# Patient Record
Sex: FEMALE | Race: WHITE | HISPANIC OR LATINO | Employment: FULL TIME | URBAN - METROPOLITAN AREA
[De-identification: names, ages, dates, MRNs, and addresses within clinical notes are randomized per-mention and may not be internally consistent; named-entity substitution may affect disease eponyms.]

---

## 2017-01-24 RX ORDER — ALBUTEROL SULFATE 90 UG/1
2 AEROSOL, METERED RESPIRATORY (INHALATION) EVERY 6 HOURS PRN
Qty: 8.5 G | Refills: 2 | Status: SHIPPED | OUTPATIENT
Start: 2017-01-24 | End: 2018-10-12

## 2017-01-24 NOTE — TELEPHONE ENCOUNTER
Was the patient seen in the last year in this department? Yes     Does patient have an active prescription for medications requested? Yes     Received Request Via: Patient    Last office visit: 11/09/2016  Last labs: 07/14/2016

## 2017-01-26 ENCOUNTER — HOSPITAL ENCOUNTER (OUTPATIENT)
Dept: LAB | Facility: MEDICAL CENTER | Age: 32
End: 2017-01-26
Attending: INTERNAL MEDICINE
Payer: COMMERCIAL

## 2017-01-26 ENCOUNTER — OFFICE VISIT (OUTPATIENT)
Dept: MEDICAL GROUP | Facility: MEDICAL CENTER | Age: 32
End: 2017-01-26
Payer: COMMERCIAL

## 2017-01-26 VITALS
SYSTOLIC BLOOD PRESSURE: 118 MMHG | OXYGEN SATURATION: 96 % | TEMPERATURE: 97.9 F | BODY MASS INDEX: 24.01 KG/M2 | HEIGHT: 66 IN | RESPIRATION RATE: 14 BRPM | HEART RATE: 72 BPM | WEIGHT: 149.4 LBS | DIASTOLIC BLOOD PRESSURE: 52 MMHG

## 2017-01-26 DIAGNOSIS — Z00.00 HEALTH CARE MAINTENANCE: ICD-10-CM

## 2017-01-26 DIAGNOSIS — E66.9 OBESITY: ICD-10-CM

## 2017-01-26 DIAGNOSIS — Z20.2 CONTACT WITH AND (SUSPECTED) EXPOSURE TO INFECTIONS WITH A PREDOMINANTLY SEXUAL MODE OF TRANSMISSION: ICD-10-CM

## 2017-01-26 DIAGNOSIS — Z11.4 ENCOUNTER FOR SCREENING FOR HIV: ICD-10-CM

## 2017-01-26 LAB
HCV AB S/CO SERPL IA: NEGATIVE
HIV 1+2 AB+HIV1 P24 AG SERPL QL IA: NON REACTIVE
TSH SERPL DL<=0.005 MIU/L-ACNC: 1.44 UIU/ML (ref 0.35–5.5)

## 2017-01-26 PROCEDURE — 84443 ASSAY THYROID STIM HORMONE: CPT

## 2017-01-26 PROCEDURE — 86803 HEPATITIS C AB TEST: CPT

## 2017-01-26 PROCEDURE — 87389 HIV-1 AG W/HIV-1&-2 AB AG IA: CPT

## 2017-01-26 PROCEDURE — 99214 OFFICE O/P EST MOD 30 MIN: CPT | Performed by: INTERNAL MEDICINE

## 2017-01-26 PROCEDURE — 36415 COLL VENOUS BLD VENIPUNCTURE: CPT

## 2017-01-26 PROCEDURE — 86694 HERPES SIMPLEX NES ANTBDY: CPT | Mod: 91

## 2017-01-26 ASSESSMENT — PATIENT HEALTH QUESTIONNAIRE - PHQ9: CLINICAL INTERPRETATION OF PHQ2 SCORE: 0

## 2017-01-26 NOTE — MR AVS SNAPSHOT
"        Nickie Martin   2017 7:00 AM   Office Visit   MRN: [id number]    Department:  [place] [address]   Dept Phone:  [phone]    Description:  Female : 1985   Provider:  Marci Villegas M.D.           Reason for Visit     Follow-Up needs blood work order      Allergies as of 2017     Allergen Noted Reactions    Bee Venom 2016   Swelling      You were diagnosed with     Health care maintenance   [048481]       Contact with and (suspected) exposure to infections with a predominantly sexual mode of transmission   [2808533]       Encounter for screening for HIV   [5863332]         Vital Signs     Blood Pressure Pulse Temperature Respirations Height Weight    118/52 mmHg 72 36.6 °C (97.9 °F) 14 1.689 m (5' 6.5\") 67.767 kg (149 lb 6.4 oz)    Body Mass Index Oxygen Saturation Smoking Status             23.76 kg/m2 96% Never Smoker          Basic Information     Date Of Birth Sex Race Ethnicity Preferred Language    1985 Female Unknown Non- English      Your appointments     May 09, 2017  8:20 AM   Established Patient with Marci Villegas M.D.   [facility]    [address]  [address]  [facility] [address] [phone]           You will be receiving a confirmation call a few days before your appointment from our automated call confirmation system.              Problem List              ICD-10-CM Priority Class Noted - Resolved    Shift work sleep disorder G47.26   10/22/2015 - Present    Exercise-induced asthma J45.990   10/22/2015 - Present    Health care maintenance Z00.00   10/22/2015 - Present    Cholesteatoma H71.90   2016 - Present    Bee sting allergy Z91.038   2016 - Present      Health Maintenance        Date Due Completion Dates    IMM DTaP/Tdap/Td Vaccine (1 - Tdap) 2003 ---    PAP SMEAR 2019, 2016, 2016 (Done)    Override on 2016: Done            Current " Immunizations     Influenza TIV (IM) 10/23/2013, 10/10/2012    Influenza Vaccine Quad Inj (Pf) 10/3/2016 10:04 AM, 10/13/2014    Influenza Vaccine Quad Inj (Preserved) 10/13/2015    Tuberculin Skin Test 6/8/2016  8:31 AM, 5/19/2016  3:46 PM, 1/3/2014 12:25 PM, 1/4/2013  2:20 PM, 1/10/2011  7:20 AM      Below and/or attached are the medications your provider expects you to take. Review all of your home medications and newly ordered medications with your provider and/or pharmacist. Follow medication instructions as directed by your provider and/or pharmacist. Please keep your medication list with you and share with your provider. Update the information when medications are discontinued, doses are changed, or new medications (including over-the-counter products) are added; and carry medication information at all times in the event of emergency situations     Allergies:  BEE VENOM - Swelling               Medications  Valid as of: January 26, 2017 -  7:16 AM    Generic Name Brand Name Tablet Size Instructions for use    Albuterol Sulfate (Aero Soln) albuterol 108 (90 BASE) MCG/ACT Inhale 2 Puffs by mouth every 6 hours as needed.        ALPRAZolam (Tab) XANAX 0.25 MG Take 1 Tab by mouth 2 times a day as needed for Sleep.        Fluconazole (Tab) DIFLUCAN 150 MG Take one tablet every three days        Zolpidem Tartrate (Tab) AMBIEN 10 MG Take 1 Tab by mouth at bedtime as needed.        .                 Medicines prescribed today were sent to:     [id number] - [place] - [address] [place] [address]    Phone: [phone] Fax: [phone]    Open 24 Hours?: No      Medication refill instructions:       If your prescription bottle indicates you have medication refills left, it is not necessary to call your provider’s office. Please contact your pharmacy and they will refill your medication.    If your prescription bottle indicates you do not have any refills left, you may request refills at any time  through one of the following ways: The online [employer] system (except Urgent Care), by calling your provider’s office, or by asking your pharmacy to contact your provider’s office with a refill request. Medication refills are processed only during regular business hours and may not be available until the next business day. Your provider may request additional information or to have a follow-up visit with you prior to refilling your medication.   *Please Note: Medication refills are assigned a new Rx number when refilled electronically. Your pharmacy may indicate that no refills were authorized even though a new prescription for the same medication is available at the pharmacy. Please request the medicine by name with the pharmacy before contacting your provider for a refill.        Your To Do List     Future Labs/Procedures Complete By Expires    HEP C VIRUS ANTIBODY  As directed 1/27/2018    HIV ANTIBODIES  As directed 1/27/2018    HSV I/II IGG & IGM SERUM  As directed 1/27/2018         [employer] Access Code: Activation code not generated  Current [employer] Status: Active

## 2017-01-26 NOTE — PROGRESS NOTES
CHIEF COMPLAINT  Chief Complaint   Patient presents with   • Follow-Up     needs blood work order       HPI  Patient is a 31 y.o. female patient who presents today for the following     Possible contact to STD, decreased labs for STDs  The patient had the last sexual contact with her  in 8/2016, and found out that he had been sexually active with other partners.      Denies:  Abnormal vaginal discharge  Suprapubic discomfort.   She has had periods every month,     Reviewed PMH, PSH, FH, SH, ALL, HCM/IMM, no changes  Reviewed MEDS, no changes    Patient Active Problem List    Diagnosis Date Noted   • Bee sting allergy 11/09/2016   • Cholesteatoma 04/11/2016   • Shift work sleep disorder 10/22/2015   • Exercise-induced asthma 10/22/2015   • Health care maintenance 10/22/2015     CURRENT MEDICATIONS  Current Outpatient Prescriptions   Medication Sig Dispense Refill   • albuterol 108 (90 BASE) MCG/ACT Aero Soln inhalation aerosol Inhale 2 Puffs by mouth every 6 hours as needed. 8.5 g 2   • zolpidem (AMBIEN) 10 MG Tab Take 1 Tab by mouth at bedtime as needed. 30 Tab 5   • alprazolam (XANAX) 0.25 MG Tab Take 1 Tab by mouth 2 times a day as needed for Sleep. 40 Tab 1   • fluconazole (DIFLUCAN) 150 MG tablet Take one tablet every three days 3 Tab 0     No current facility-administered medications for this visit.     ALLERGIES  Allergies: Bee venom  PAST MEDICAL HISTORY  Past Medical History   Diagnosis Date   • ASTHMA    • Insomnia    • Cholesteatoma      right     SURGICAL HISTORY  She  has past surgical history that includes anesth,ear surgery (2000) and mammoplasty augmentation (4/18/2012).  SOCIAL HISTORY  Social History   Substance Use Topics   • Smoking status: Never Smoker    • Smokeless tobacco: Never Used   • Alcohol Use: 0.6 oz/week     1 Standard drinks or equivalent per week      Comment: 4 per week     Social History     Social History Narrative    Lives with .     Children: 1    Work: [employer] RN  "    FAMILY HISTORY  Family History   Problem Relation Age of Onset   • Cancer Neg Hx    • Allergies Paternal Grandfather      asthma   • Diabetes Paternal Grandmother    • Heart Disease Neg Hx    • Hypertension Maternal Grandmother    • Hyperlipidemia Neg Hx    • Stroke Neg Hx    • Thyroid Neg Hx      No family status information on file.     Health Maintenance: Completed        ROS   Constitutional: Negative for fever, chills.  HENT: Negative for congestion, sore throat.  Eyes: Negative for blurred vision.   Respiratory: Negative for cough, shortness of breath.  Cardiovascular: Negative for chest pain, palpitations.   Gastrointestinal: Negative for heartburn, nausea, abdominal pain.   Genitourinary: Negative for dysuria. And per HPI.  Musculoskeletal: Negative for significant myalgias, back pain and joint pain.   Skin: Negative for rash and itching.   Neuro: Negative for dizziness, weakness and headaches.   Endo/Heme/Allergies: Does not bruise/bleed easily.   Psychiatric/Behavioral: Negative for depression, anxiety    PHYSICAL EXAM   Blood pressure 118/52, pulse 72, temperature 36.6 °C (97.9 °F), resp. rate 14, height 1.689 m (5' 6.5\"), weight 67.767 kg (149 lb 6.4 oz), SpO2 96 %, currently breastfeeding. Body mass index is 23.76 kg/(m^2).  General:  NAD, well appearing  HEENT:   NC/AT, PERRLA, EOMI, TMs are clear. Oropharyngeal mucosa is pink,  without lesions;  no cervical / supraclavicular  lymphadenopathy, no thyromegaly.    Cardiovascular: RRR.   No m/r/g. No carotid bruits .      Lungs:   CTAB, no w/r/r, no respiratory distress.  Abdomen: Soft, NT/ND + BS; no suprapubic tenderness; no masses or hepatosplenomegaly.  Extremities:  2+ DP and radial pulses bilaterally.  No c/c/e.   Skin:  Warm, dry.  No erythema. No rash.   Neurologic: Alert & oriented x 3. CN II-XII grossly intact. Brachioradialis / knee DTR are 2/4, symmetric. Strength and sensation grossly intact.  No focal deficits.  Psychiatric:  Affect " normal, mood normal, judgment normal.    LABS     Pending    IMAGING     None    ASSESMENT AND PLAN        1. Contact with and (suspected) exposure to infections with a predominantly sexual mode of transmission  - HSV I/II IGG & IGM SERUM; Future  - HEP C VIRUS ANTIBODY; Future    2. Encounter for screening for HIV  - HIV ANTIBODIES; Future    3. Health care maintenance  UTD    Counseling:   - Smoking:  Nonsmoker    Followup: Return if symptoms worsen or fail to improve.    All questions are answered.    Please note that this dictation was created using voice recognition software, and that there might be errors of roland and possibly content.

## 2017-01-29 LAB
HSV1+2 IGG SER IA-ACNC: 0.16 IV
HSV1+2 IGM SER IA-ACNC: 1.02 IV

## 2017-01-30 DIAGNOSIS — Z01.89 PATIENT REQUESTED DIAGNOSTIC TESTING: ICD-10-CM

## 2017-01-30 RX ORDER — FLUCONAZOLE 150 MG/1
TABLET ORAL
Qty: 1 TAB | Refills: 0 | Status: SHIPPED | OUTPATIENT
Start: 2017-01-30 | End: 2017-07-26

## 2017-01-30 NOTE — TELEPHONE ENCOUNTER
Was the patient seen in the last year in this department? Yes     Does patient have an active prescription for medications requested? No     Received Request Via: Patient     Last Office Visit: 1/26/17    Last Labs: 1/26/17

## 2017-01-30 NOTE — TELEPHONE ENCOUNTER
From: Nickie Martin  To: Marci Villegas M.D.  Sent: 1/30/2017 7:15 AM PST  Subject: Medication Renewal Request    Original authorizing provider: JACOB Caba would like a refill of the following medications:  fluconazole (DIFLUCAN) 150 MG tablet [Marci Villegas M.D.]    Preferred pharmacy: [id number] - [place], [place] - [address]    Comment:

## 2017-01-31 DIAGNOSIS — Z01.89 PATIENT REQUESTED DIAGNOSTIC TESTING: ICD-10-CM

## 2017-03-06 DIAGNOSIS — G47.26 SHIFT WORK SLEEP DISORDER: ICD-10-CM

## 2017-03-06 RX ORDER — TRIAZOLAM 0.12 MG/1
0.12 TABLET ORAL NIGHTLY PRN
Qty: 30 TAB | Refills: 2 | Status: ON HOLD | OUTPATIENT
Start: 2017-03-06 | End: 2017-08-04

## 2017-05-11 DIAGNOSIS — F41.9 ANXIETY: ICD-10-CM

## 2017-05-11 RX ORDER — ALPRAZOLAM 0.25 MG/1
0.25 TABLET ORAL 2 TIMES DAILY PRN
Qty: 40 TAB | Refills: 0
Start: 2017-05-11

## 2017-05-11 NOTE — TELEPHONE ENCOUNTER
Was the patient seen in the last year in this department? Yes     Does patient have an active prescription for medications requested? No     Received Request Via: Patient     Last Visit: 1/26/17    Last Labs: 1/26/17

## 2017-05-24 RX ORDER — ZOLPIDEM TARTRATE 10 MG/1
10 TABLET ORAL NIGHTLY PRN
Qty: 30 TAB | Refills: 0 | Status: SHIPPED | OUTPATIENT
Start: 2017-05-24 | End: 2017-06-29 | Stop reason: SDUPTHER

## 2017-05-24 RX ORDER — ZOLPIDEM TARTRATE 10 MG/1
10 TABLET ORAL NIGHTLY PRN
COMMUNITY
End: 2017-05-24 | Stop reason: SDUPTHER

## 2017-06-13 ENCOUNTER — HOSPITAL ENCOUNTER (OUTPATIENT)
Dept: RADIOLOGY | Facility: MEDICAL CENTER | Age: 32
End: 2017-06-13
Attending: INTERNAL MEDICINE
Payer: COMMERCIAL

## 2017-06-13 ENCOUNTER — OFFICE VISIT (OUTPATIENT)
Dept: MEDICAL GROUP | Facility: MEDICAL CENTER | Age: 32
End: 2017-06-13
Payer: COMMERCIAL

## 2017-06-13 VITALS
BODY MASS INDEX: 23.53 KG/M2 | WEIGHT: 146.39 LBS | HEART RATE: 69 BPM | OXYGEN SATURATION: 99 % | HEIGHT: 66 IN | TEMPERATURE: 97.6 F | DIASTOLIC BLOOD PRESSURE: 66 MMHG | SYSTOLIC BLOOD PRESSURE: 116 MMHG

## 2017-06-13 DIAGNOSIS — N63.0 BREAST LUMP IN LOWER INNER QUADRANT: ICD-10-CM

## 2017-06-13 DIAGNOSIS — Z00.00 HEALTH CARE MAINTENANCE: ICD-10-CM

## 2017-06-13 DIAGNOSIS — Z98.82 S/P BREAST AUGMENTATION: ICD-10-CM

## 2017-06-13 PROCEDURE — G0204 DX MAMMO INCL CAD BI: HCPCS

## 2017-06-13 PROCEDURE — 99214 OFFICE O/P EST MOD 30 MIN: CPT | Performed by: INTERNAL MEDICINE

## 2017-06-13 PROCEDURE — 76642 ULTRASOUND BREAST LIMITED: CPT | Mod: RT

## 2017-06-13 NOTE — PROGRESS NOTES
CHIEF COMPLAINT  Chief Complaint   Patient presents with   • Other     lump on right breast      HPI  Patient is a 31 y.o. female patient who presents today for the following     Breast lump, s/p breast augmentation  The patient noticed lump in the right breast  4 days ago:  Lower/medial quadrant,    1 cm, non- tender, non-movable.   No change of the breast skin, asymmetry, nipple discharge.   Change with menstrual cycles: NA   LMP: 3 weeks ago  No axillary, cervical LAD.   No fatigue, fevers, night sweats, weight loss.   She is  doing self breast exam.     FH of breast cancer: neg    Reviewed PMH, PSH, FH, SH, ALL, HCM/IMM, no changes  Reviewed MEDS, no changes    Patient Active Problem List    Diagnosis Date Noted   • Bee sting allergy 11/09/2016   • Cholesteatoma 04/11/2016   • Shift work sleep disorder 10/22/2015   • Exercise-induced asthma 10/22/2015   • Health care maintenance 10/22/2015     CURRENT MEDICATIONS  Current Outpatient Prescriptions   Medication Sig Dispense Refill   • zolpidem (AMBIEN) 10 MG Tab Take 1 Tab by mouth at bedtime as needed for Sleep. 30 Tab 0   • triazolam (HALCION) 0.125 MG tablet Take 1 Tab by mouth at bedtime as needed. 30 Tab 2   • fluconazole (DIFLUCAN) 150 MG tablet Take one tablet every three days 1 Tab 0   • albuterol 108 (90 BASE) MCG/ACT Aero Soln inhalation aerosol Inhale 2 Puffs by mouth every 6 hours as needed. 8.5 g 2   • alprazolam (XANAX) 0.25 MG Tab Take 1 Tab by mouth 2 times a day as needed for Sleep. 40 Tab 1     No current facility-administered medications for this visit.     ALLERGIES  Allergies: Bee venom  PAST MEDICAL HISTORY  Past Medical History   Diagnosis Date   • ASTHMA    • Insomnia    • Cholesteatoma      right     SURGICAL HISTORY  She  has past surgical history that includes anesth,ear surgery (2000) and mammoplasty augmentation (4/18/2012).  SOCIAL HISTORY  Social History   Substance Use Topics   • Smoking status: Never Smoker    • Smokeless tobacco:  "Never Used   • Alcohol Use: 0.6 oz/week     1 Standard drinks or equivalent per week      Comment: 4 per week     Social History     Social History Narrative    Lives with .     Children: 1    Work: [employer]     FAMILY HISTORY  Family History   Problem Relation Age of Onset   • Cancer Neg Hx    • Allergies Paternal Grandfather      asthma   • Diabetes Paternal Grandmother    • Heart Disease Neg Hx    • Hypertension Maternal Grandmother    • Hyperlipidemia Neg Hx    • Stroke Neg Hx    • Thyroid Neg Hx      No family status information on file.     ROS   Constitutional: Negative for fever, chills.  HENT: Negative for congestion, sore throat.  Eyes: Negative for blurred vision.   Respiratory: Negative for cough, shortness of breath.  Cardiovascular: Negative for chest pain, palpitations.   Gastrointestinal: Negative for heartburn, nausea, abdominal pain.   Genitourinary: Negative for dysuria.  Musculoskeletal: Negative for significant myalgias, back pain and joint pain.   Skin: Negative for rash and itching.   Neuro: Negative for dizziness, weakness and headaches.   Endo/Heme/Allergies: Does not bruise/bleed easily.   Psychiatric/Behavioral: Negative for depression, anxiety    PHYSICAL EXAM   Blood pressure 116/66, pulse 69, temperature 36.4 °C (97.6 °F), height 1.676 m (5' 5.98\"), weight 66.4 kg (146 lb 6.2 oz), SpO2 99 %, currently breastfeeding. Body mass index is 23.64 kg/(m^2).  General:  NAD, well appearing  HEENT:   NC/AT, PERRLA, EOMI, TMs are clear. Oropharyngeal mucosa is pink,  without lesions;  no cervical / supraclavicular  lymphadenopathy, no thyromegaly.    Cardiovascular: RRR.   No m/r/g. No carotid bruits .      Lungs:   CTAB, no w/r/r, no respiratory distress.  Breasts examined seated and supine. No skin changes, peau d'orange or nipple retraction. No axillary or supraclavicular adenopathy.   Non-tender, firm, movable lump 1.5 - 2 x 1 cm at 3 o.clock on the right side.  Breast implants. "   Abdomen: Soft, NT/ND + BS; no suprapubic tenderness; no masses or hepatosplenomegaly.  Extremities:  2+ DP and radial pulses bilaterally.  No c/c/e.   Skin:  Warm, dry.  No erythema. No rash.   Neurologic: Alert & oriented x 3. No focal deficits.  Psychiatric:  Affect normal, mood normal, judgment normal.    LABS     None    IMAGING     None    ASSESMENT AND PLAN        1. Breast lump in lower inner quadrant  2. S/P breast augmentation  - US-BREAST COMPLETE-RIGHT; Future    3. Health care maintenance  UTD    Counseling:   - Smoking:  Nonsmoker    Followup: Return if symptoms worsen or fail to improve, for Short.    All questions are answered.    Please note that this dictation was created using voice recognition software, and that there might be errors of roland and possibly content.

## 2017-06-14 ENCOUNTER — TELEPHONE (OUTPATIENT)
Dept: MEDICAL GROUP | Facility: MEDICAL CENTER | Age: 32
End: 2017-06-14

## 2017-06-14 ENCOUNTER — PATIENT MESSAGE (OUTPATIENT)
Dept: MEDICAL GROUP | Facility: MEDICAL CENTER | Age: 32
End: 2017-06-14

## 2017-06-14 DIAGNOSIS — N63.0 BREAST LUMP IN FEMALE: ICD-10-CM

## 2017-06-20 ENCOUNTER — HOSPITAL ENCOUNTER (OUTPATIENT)
Dept: LAB | Facility: MEDICAL CENTER | Age: 32
End: 2017-06-20
Payer: COMMERCIAL

## 2017-06-20 LAB
BDY FAT % MEASURED: 27.5 %
BP DIAS: 66 MMHG
BP SYS: 98 MMHG
CHOLEST SERPL-MCNC: 169 MG/DL (ref 100–199)
DIABETES HTDIA: NO
EVENT NAME HTEVT: NORMAL
FASTING HTFAS: YES
GLUCOSE SERPL-MCNC: 100 MG/DL (ref 65–99)
HDLC SERPL-MCNC: 60 MG/DL
HYPERTENSION HTHYP: NO
LDLC SERPL CALC-MCNC: 93 MG/DL
SCREENING LOC CITY HTCIT: NORMAL
SCREENING LOC STATE HTSTA: NORMAL
SCREENING LOCATION HTLOC: NORMAL
SMOKING HTSMO: NO
SUBSCRIBER ID HTSID: NORMAL
TRIGL SERPL-MCNC: 78 MG/DL (ref 0–149)

## 2017-06-20 PROCEDURE — S5190 WELLNESS ASSESSMENT BY NONPH: HCPCS

## 2017-06-20 PROCEDURE — 80061 LIPID PANEL: CPT

## 2017-06-20 PROCEDURE — 82947 ASSAY GLUCOSE BLOOD QUANT: CPT

## 2017-06-29 RX ORDER — ZOLPIDEM TARTRATE 10 MG/1
10 TABLET ORAL NIGHTLY PRN
Qty: 30 TAB | Refills: 0 | Status: SHIPPED | OUTPATIENT
Start: 2017-06-29 | End: 2017-08-07 | Stop reason: SDUPTHER

## 2017-07-26 ENCOUNTER — APPOINTMENT (OUTPATIENT)
Dept: ADMISSIONS | Facility: MEDICAL CENTER | Age: 32
End: 2017-07-26
Payer: COMMERCIAL

## 2017-07-26 ENCOUNTER — PATIENT MESSAGE (OUTPATIENT)
Dept: MEDICAL GROUP | Facility: MEDICAL CENTER | Age: 32
End: 2017-07-26

## 2017-07-26 ENCOUNTER — APPOINTMENT (OUTPATIENT)
Dept: ADMISSIONS | Facility: MEDICAL CENTER | Age: 32
End: 2017-07-26
Attending: SURGERY
Payer: COMMERCIAL

## 2017-07-26 DIAGNOSIS — F41.9 ANXIETY: ICD-10-CM

## 2017-07-26 RX ORDER — ALPRAZOLAM 0.25 MG/1
0.25 TABLET ORAL 2 TIMES DAILY PRN
Qty: 40 TAB | Refills: 1 | Status: SHIPPED | OUTPATIENT
Start: 2017-07-26 | End: 2017-10-26 | Stop reason: SDUPTHER

## 2017-08-04 ENCOUNTER — HOSPITAL ENCOUNTER (OUTPATIENT)
Facility: MEDICAL CENTER | Age: 32
End: 2017-08-04
Attending: SURGERY | Admitting: SURGERY
Payer: COMMERCIAL

## 2017-08-04 VITALS
BODY MASS INDEX: 22.71 KG/M2 | OXYGEN SATURATION: 100 % | RESPIRATION RATE: 16 BRPM | HEART RATE: 64 BPM | HEIGHT: 66 IN | SYSTOLIC BLOOD PRESSURE: 107 MMHG | TEMPERATURE: 98.6 F | WEIGHT: 141.31 LBS | DIASTOLIC BLOOD PRESSURE: 63 MMHG

## 2017-08-04 PROBLEM — R92.8 ABNORMAL MAMMOGRAM, UNSPECIFIED: Status: ACTIVE | Noted: 2017-08-04

## 2017-08-04 LAB
HCG UR QL: NEGATIVE
SP GR UR REFRACTOMETRY: 1.02

## 2017-08-04 PROCEDURE — 700111 HCHG RX REV CODE 636 W/ 250 OVERRIDE (IP)

## 2017-08-04 PROCEDURE — 160009 HCHG ANES TIME/MIN: Performed by: SURGERY

## 2017-08-04 PROCEDURE — 160047 HCHG PACU  - EA ADDL 30 MINS PHASE II: Performed by: SURGERY

## 2017-08-04 PROCEDURE — 160035 HCHG PACU - 1ST 60 MINS PHASE I: Performed by: SURGERY

## 2017-08-04 PROCEDURE — 160036 HCHG PACU - EA ADDL 30 MINS PHASE I: Performed by: SURGERY

## 2017-08-04 PROCEDURE — 160048 HCHG OR STATISTICAL LEVEL 1-5: Performed by: SURGERY

## 2017-08-04 PROCEDURE — 88307 TISSUE EXAM BY PATHOLOGIST: CPT

## 2017-08-04 PROCEDURE — 700102 HCHG RX REV CODE 250 W/ 637 OVERRIDE(OP)

## 2017-08-04 PROCEDURE — A9270 NON-COVERED ITEM OR SERVICE: HCPCS

## 2017-08-04 PROCEDURE — 160046 HCHG PACU - 1ST 60 MINS PHASE II: Performed by: SURGERY

## 2017-08-04 PROCEDURE — A6402 STERILE GAUZE <= 16 SQ IN: HCPCS | Performed by: SURGERY

## 2017-08-04 PROCEDURE — 81025 URINE PREGNANCY TEST: CPT

## 2017-08-04 PROCEDURE — 160002 HCHG RECOVERY MINUTES (STAT): Performed by: SURGERY

## 2017-08-04 PROCEDURE — 160025 RECOVERY II MINUTES (STATS): Performed by: SURGERY

## 2017-08-04 PROCEDURE — 160029 HCHG SURGERY MINUTES - 1ST 30 MINS LEVEL 4: Performed by: SURGERY

## 2017-08-04 PROCEDURE — 500698 HCHG HEMOCLIP, MEDIUM: Performed by: SURGERY

## 2017-08-04 PROCEDURE — 501838 HCHG SUTURE GENERAL: Performed by: SURGERY

## 2017-08-04 PROCEDURE — 500122 HCHG BOVIE, BLADE: Performed by: SURGERY

## 2017-08-04 RX ORDER — LIDOCAINE HYDROCHLORIDE 10 MG/ML
0.5 INJECTION, SOLUTION INFILTRATION; PERINEURAL
Status: DISCONTINUED | OUTPATIENT
Start: 2017-08-04 | End: 2017-08-04 | Stop reason: HOSPADM

## 2017-08-04 RX ORDER — BUPIVACAINE HYDROCHLORIDE 2.5 MG/ML
INJECTION, SOLUTION EPIDURAL; INFILTRATION; INTRACAUDAL
Status: DISCONTINUED | OUTPATIENT
Start: 2017-08-04 | End: 2017-08-04 | Stop reason: HOSPADM

## 2017-08-04 RX ORDER — KETOROLAC TROMETHAMINE 30 MG/ML
INJECTION, SOLUTION INTRAMUSCULAR; INTRAVENOUS
Status: COMPLETED
Start: 2017-08-04 | End: 2017-08-04

## 2017-08-04 RX ORDER — HYDROCODONE BITARTRATE AND ACETAMINOPHEN 5; 325 MG/1; MG/1
1-2 TABLET ORAL EVERY 4 HOURS PRN
Status: DISCONTINUED | OUTPATIENT
Start: 2017-08-04 | End: 2017-08-04 | Stop reason: HOSPADM

## 2017-08-04 RX ORDER — LIDOCAINE AND PRILOCAINE 25; 25 MG/G; MG/G
1 CREAM TOPICAL
Status: DISCONTINUED | OUTPATIENT
Start: 2017-08-04 | End: 2017-08-04 | Stop reason: HOSPADM

## 2017-08-04 RX ORDER — B-COMPLEX WITH VITAMIN C
1 TABLET ORAL DAILY
COMMUNITY
End: 2018-10-12

## 2017-08-04 RX ORDER — IBUPROFEN 200 MG
400 TABLET ORAL
COMMUNITY
End: 2020-06-23

## 2017-08-04 RX ORDER — MEPERIDINE HYDROCHLORIDE 25 MG/ML
INJECTION INTRAMUSCULAR; INTRAVENOUS; SUBCUTANEOUS
Status: COMPLETED
Start: 2017-08-04 | End: 2017-08-04

## 2017-08-04 RX ORDER — OXYCODONE HCL 5 MG/5 ML
SOLUTION, ORAL ORAL
Status: COMPLETED
Start: 2017-08-04 | End: 2017-08-04

## 2017-08-04 RX ORDER — DEXTROSE AND SODIUM CHLORIDE 5; .45 G/100ML; G/100ML
INJECTION, SOLUTION INTRAVENOUS CONTINUOUS
Status: DISCONTINUED | OUTPATIENT
Start: 2017-08-04 | End: 2017-08-04 | Stop reason: HOSPADM

## 2017-08-04 RX ORDER — SODIUM CHLORIDE, SODIUM LACTATE, POTASSIUM CHLORIDE, CALCIUM CHLORIDE 600; 310; 30; 20 MG/100ML; MG/100ML; MG/100ML; MG/100ML
1000 INJECTION, SOLUTION INTRAVENOUS
Status: COMPLETED | OUTPATIENT
Start: 2017-08-04 | End: 2017-08-04

## 2017-08-04 RX ADMIN — KETOROLAC TROMETHAMINE 30 MG: 30 INJECTION, SOLUTION INTRAMUSCULAR at 14:55

## 2017-08-04 RX ADMIN — SODIUM CHLORIDE, SODIUM LACTATE, POTASSIUM CHLORIDE, CALCIUM CHLORIDE 1000 ML: 600; 310; 30; 20 INJECTION, SOLUTION INTRAVENOUS at 11:56

## 2017-08-04 RX ADMIN — FENTANYL CITRATE 50 MCG: 50 INJECTION, SOLUTION INTRAMUSCULAR; INTRAVENOUS at 15:08

## 2017-08-04 RX ADMIN — MEPERIDINE HYDROCHLORIDE 12.5 MG: 25 INJECTION INTRAMUSCULAR; INTRAVENOUS; SUBCUTANEOUS at 15:31

## 2017-08-04 RX ADMIN — OXYCODONE HYDROCHLORIDE 10 MG: 5 SOLUTION ORAL at 15:07

## 2017-08-04 ASSESSMENT — PAIN SCALES - GENERAL
PAINLEVEL_OUTOF10: 0
PAINLEVEL_OUTOF10: 3
PAINLEVEL_OUTOF10: 0
PAINLEVEL_OUTOF10: 1
PAINLEVEL_OUTOF10: 0

## 2017-08-04 NOTE — IP AVS SNAPSHOT
8/4/2017    Nickie Martin  [address] [place] [address]    Dear Nickie:    [facility] wants to ensure your discharge home is safe and you or your loved ones have had all of your questions answered regarding your care after you leave the hospital.    Below is a list of resources and contact information should you have any questions regarding your hospital stay, follow-up instructions, or active medical symptoms.    Questions or Concerns Regarding… Contact   Medical Questions Related to Your Discharge  (7 days a week, 8am-5pm) Contact a Nurse Care Coordinator   [phone]   Medical Questions Not Related to Your Discharge  (24 hours a day / 7 days a week)  Contact the Nurse Health Line   [phone]    Medications or Discharge Instructions Refer to your discharge packet   or contact your [facility] Primary Care Provider   [phone]   Follow-up Appointment(s) Schedule your appointment via [employer]   or contact Scheduling [phone]   Billing Review your statement via [employer]  or contact Billing [phone]   Medical Records Review your records via [employer]   or contact Medical Records [phone]     You may receive a telephone call within two days of discharge. This call is to make certain you understand your discharge instructions and have the opportunity to have any questions answered. You can also easily access your medical information, test results and upcoming appointments via the [employer] free online health management tool. You can learn more and sign up at [employer]. For assistance setting up your [employer] account, please call [phone].    Once again, we want to ensure your discharge home is safe and that you have a clear understanding of any next steps in your care. If you have any questions or concerns, please do not hesitate to contact us, we are here for you. Thank you for choosing [facility] for your healthcare needs.    Sincerely,    Your [facility] Healthcare Team

## 2017-08-04 NOTE — OR NURSING
Pt's contact updated shortly after arrival to [facility].  Attempted to call again with no answer, contact will be picking up pt.  Rx on front of chart.  Pain/discomfort caused by dry eyes much better, pt flushed eyes with NS multiple times.  Emily MCNAIR by to see/update pt.

## 2017-08-04 NOTE — IP AVS SNAPSHOT
" Home Care Instructions                                                                                                                Name:Nickie Martin  Medical Record Number:[id number]  CSN: [id number]    YOB: 1985   Age: 31 y.o.  Sex: female  HT:1.676 m (5' 6\") WT: 64.1 kg (141 lb 5 oz)          Admit Date: 8/4/2017     Discharge Date:   Today's Date: 8/4/2017  Attending Doctor:  Ngoc Simmons M.D.                  Allergies:  Bee venom                Discharge Instructions       SURGICAL DRESSING/BATHING: May remove dressings 08/06/2017.  FOLLOW-UP APPOINTMENT:  A follow-up appointment should be arranged with your doctor on 08/09/2017, call to schedule.    ACTIVITY: Rest and take it easy for the first 24 hours.  A responsible adult is recommended to remain with you during that time.  It is normal to feel sleepy.  We encourage you to not do anything that requires balance, judgment or coordination.    MILD FLU-LIKE SYMPTOMS ARE NORMAL. YOU MAY EXPERIENCE GENERALIZED MUSCLE ACHES, THROAT IRRITATION, HEADACHE AND/OR SOME NAUSEA.    FOR 24 HOURS DO NOT:  Drive, operate machinery or run household appliances.  Drink beer or alcoholic beverages.   Make important decisions or sign legal documents.    SPECIAL INSTRUCTIONS:   Breast Biopsy, Care After  Refer to this sheet in the next few weeks. These instructions provide you with information on caring for yourself after your procedure. Your caregiver may also give you more specific instructions. Your treatment has been planned according to current medical practices, but problems sometimes occur. Call your caregiver if you have any problems or questions after your procedure.  HOME CARE INSTRUCTIONS   · Only take over-the-counter or prescription medicines for pain, discomfort, or fever as directed by your caregiver.  · Do not take aspirin. It can cause bleeding.  · Keep stitches dry when bathing.  · Protect the biopsy area. Do not let the area get " bumped.  · Avoid activities that may pull the incision site open until approved by your caregiver. This can include stretching, reaching, exercise, sports, or lifting over 3 pounds.  · Resume your usual diet.  · Wear a good support bra for as long as directed by your caregiver.  · Change any bandages (dressings) as directed by your caregiver.  · Do not drink alcohol while taking pain medicine.  · Keep all your follow-up appointments with your caregiver. Ask when your test results will be ready. Make sure you get your test results.  SEEK MEDICAL CARE IF:   · You have redness, swelling, or increasing pain in the biopsy site.  · You have a bad smell coming from the biopsy site or dressing.  · Your biopsy site breaks open after the stitches (sutures), staples, or skin adhesive strips have been removed.  · You have a rash.  · You need stronger medicine.  SEEK IMMEDIATE MEDICAL CARE IF:   · You have a fever.  · You have increased bleeding (more than a small spot) from the biopsy site.  · You have difficulty breathing.  · You have pus coming from the biopsy site.  MAKE SURE YOU:  · Understand these instructions.  · Will watch your condition.  · Will get help right away if you are not doing well or get worse.     This information is not intended to replace advice given to you by your health care provider. Make sure you discuss any questions you have with your health care provider.         DIET: To avoid nausea, slowly advance diet as tolerated, avoiding spicy or greasy foods for the first day.  Add more substantial food to your diet according to your physician's instructions.  Babies can be fed formula or breast milk as soon as they are hungry.  INCREASE FLUIDS AND FIBER TO AVOID CONSTIPATION.      You should CALL YOUR PHYSICIAN if you develop:  Fever greater than 101 degrees F.  Pain not relieved by medication, or persistent nausea or vomiting.  Excessive bleeding (blood soaking through dressing) or unexpected drainage  from the wound.  Extreme redness or swelling around the incision site, drainage of pus or foul smelling drainage.  Inability to urinate or empty your bladder within 8 hours.  Problems with breathing or chest pain.    You should call 911 if you develop problems with breathing or chest pain.  If you are unable to contact your doctor or surgical center, you should go to the nearest emergency room or urgent care center.  Physician's telephone #: [phone].    If any questions arise, call your doctor.  If your doctor is not available, please feel free to call the Surgical Center at [phone].  The Center is open Monday through Friday from 7AM to 7PM.  You can also call the [facility] HOTLINE open 24 hours/day, 7 days/week and speak to a nurse at [phone], or toll free at [phone].    A registered nurse may call you a few days after your surgery to see how you are doing after your procedure.    MEDICATIONS: Resume taking daily medication.  Take prescribed pain medication with food.  If no medication is prescribed, you may take non-aspirin pain medication if needed.  PAIN MEDICATION CAN BE VERY CONSTIPATING.  Take a stool softener or laxative such as senokot, pericolace, or milk of magnesia if needed.    Prescription given for Norco.  Last pain medication given at 3:10 PM    If your physician has prescribed pain medication that includes Acetaminophen (Tylenol), do not take additional Acetaminophen (Tylenol) while taking the prescribed medication.    Depression / Suicide Risk    As you are discharged from this [facility] facility, it is important to learn how to keep safe from harming yourself.    Recognize the warning signs:  · Abrupt changes in personality, positive or negative- including increase in energy   · Giving away possessions  · Change in eating patterns- significant weight changes-  positive or negative  · Change in sleeping patterns- unable to sleep or sleeping all the time   · Unwillingness  or inability to communicate  · Depression  · Unusual sadness, discouragement and loneliness  · Talk of wanting to die  · Neglect of personal appearance   · Rebelliousness- reckless behavior  · Withdrawal from people/activities they love  · Confusion- inability to concentrate     If you or a loved one observes any of these behaviors or has concerns about self-harm, here's what you can do:  · Talk about it- your feelings and reasons for harming yourself  · Remove any means that you might use to hurt yourself (examples: pills, rope, extension cords, firearm)  · Get professional help from the community (Mental Health, Substance Abuse, psychological counseling)  · Do not be alone:Call your Safe Contact- someone whom you trust who will be there for you.  · Call your local CRISIS HOTLINE 463-9307 or 473-684-0613  · Call your local Children's Mobile Crisis Response Team [place] (717) 671-5281 or www.Tradesy  · Call the toll free National Suicide Prevention Hotlines   · National Suicide Prevention Lifeline 184-147-BAID (5801)  · [employer] Line Network 800-SUICIDE (236-3498)       Medication List      ASK your doctor about these medications        Instructions    Morning Afternoon Evening Bedtime    albuterol 108 (90 BASE) MCG/ACT Aers inhalation aerosol        Inhale 2 Puffs by mouth every 6 hours as needed.   Dose:  2 Puff                        alprazolam 0.25 MG Tabs   Commonly known as:  XANAX        Take 1 Tab by mouth 2 times a day as needed for Sleep.   Dose:  0.25 mg                        ibuprofen 200 MG Tabs   Commonly known as:  MOTRIN        Take 400 mg by mouth 1 time daily as needed.   Dose:  400 mg                        multivitamin Tabs        Take 1 Tab by mouth every day.   Dose:  1 Tab                        Vitamin B Complex Tabs        Take 1 Tab by mouth every day.   Dose:  1 Tab                        zolpidem 10 MG Tabs   Commonly known as:  AMBIEN        Doctor's comments:  Need  OV every 3 months for refill   Take 1 Tab by mouth at bedtime as needed for Sleep.   Dose:  10 mg                                Medication Information     Above and/or attached are the medications your physician expects you to take upon discharge. Review all of your home medications and newly ordered medications with your doctor and/or pharmacist. Follow medication instructions as directed by your doctor and/or pharmacist. Please keep your medication list with you and share with your physician. Update the information when medications are discontinued, doses are changed, or new medications (including over-the-counter products) are added; and carry medication information at all times in the event of emergency situations.        Resources     Quit Smoking / Tobacco Use:    I understand the use of any tobacco products increases my chance of suffering from future heart disease or stroke and could cause other illnesses which may shorten my life. Quitting the use of tobacco products is the single most important thing I can do to improve my health. For further information on smoking / tobacco cessation call a Toll Free Quit Line at 1-210.307.9949 (*[employer] [place]) or 1-550.537.6494 (American Lung Association) or you can access the web based program at www.lungusa.org.    [place] Tobacco Users Help Line:  (944) 653-8617       Toll Free: 1-864.903.8774  Quit Tobacco Program [facility] (258)850-9573    Crisis Hotline:    [facility] Crisis Hotline:  2-652-FHNEIGE or 1-942.365.8620    [place] Crisis Hotline:    1-895.234.4628 or 192-448-2805    Discharge Survey:   [facility] you for [facility]. We hope we did everything we could to make your hospital stay a pleasant one. You may be receiving a survey and we would appreciate your time and participation in answering the questions. Your input is very valuable to us in our efforts to improve our service to our patients and their families.             Signatures     My signature on this form indicates that:    1. I acknowledge receipt and understanding of these Home Care Instruction.  2. My questions regarding this information have been answered to my satisfaction.  3. I have formulated a plan with my discharge nurse to obtain my prescribed medications for home.    __________________________________      __________________________________                   Patient Signature                                 Guardian/Responsible Adult Signature      __________________________________                 __________       ________                       Nurse Signature                                               Date                 Time

## 2017-08-04 NOTE — OP REPORT
DATE OF SERVICE:  08/04/2017    DATE OF OPERATION:  08/04/2017    PREOPERATIVE DIAGNOSIS:  Right breast mass.    POSTOPERATIVE DIAGNOSIS:  Right breast mass.    PROCEDURE:  Excisional breast biopsy.    SURGEON:  Ngoc Hernandez MD.    ASSISTANT:  JUN Bernal.    ANESTHESIA:  Laryngeal mask.    ANESTHESIOLOGIST:  Edward Escalante MD.    INDICATIONS:  The patient is a 31-year-old woman who has a breast mass in the   medial aspect of her right breast.  It was solid mass and unfortunately it was   budding an implant.  She is being brought at this time for excisional biopsy.    FINDINGS:  A 1.5 cm mass removed in its entirety and sent to pathology for   evaluation.    DESCRIPTION OF PROCEDURE:  After patient was identified and consented, she was   brought to the operating room and placed in supine position.  The patient   underwent laryngeal mask anesthetic.  Patient's right breast was prepped in   sterile fashion.  A curvilinear incision was made along the circumareolar area   using electrocautery.  The subcutaneous tissue was dissected down.  The mass   was found, it was excised, was in continuity with the implant capsule which   was removed with it.  This was sent to pathology for evaluation.  The cavity   was irrigated and hemostasis secured.  Cavity was closed with 3-0 Vicryl   subcutaneous layer and skin was closed with a 4-0 in subcuticular fashion.    Op-Site dressing placed on the wound.  Patient was extubated and taken to   recovery in stable condition.  All sponge and needle counts were correct.       ____________________________________     NGOC HERNANDEZ MD    [facility] / [facility]    DD:  08/04/2017 14:45:17  DT:  08/04/2017 16:13:45    D#:  [id number]  Job#:  [id number]    cc: EDWARD ESCALANTE MD, Marci Elkins MD

## 2017-08-04 NOTE — DISCHARGE INSTRUCTIONS
SURGICAL DRESSING/BATHING: May remove dressings 08/06/2017.  FOLLOW-UP APPOINTMENT:  A follow-up appointment should be arranged with your doctor on 08/09/2017, call to schedule.    ACTIVITY: Rest and take it easy for the first 24 hours.  A responsible adult is recommended to remain with you during that time.  It is normal to feel sleepy.  We encourage you to not do anything that requires balance, judgment or coordination.    MILD FLU-LIKE SYMPTOMS ARE NORMAL. YOU MAY EXPERIENCE GENERALIZED MUSCLE ACHES, THROAT IRRITATION, HEADACHE AND/OR SOME NAUSEA.    FOR 24 HOURS DO NOT:  Drive, operate machinery or run household appliances.  Drink beer or alcoholic beverages.   Make important decisions or sign legal documents.    SPECIAL INSTRUCTIONS:   Breast Biopsy, Care After  Refer to this sheet in the next few weeks. These instructions provide you with information on caring for yourself after your procedure. Your caregiver may also give you more specific instructions. Your treatment has been planned according to current medical practices, but problems sometimes occur. Call your caregiver if you have any problems or questions after your procedure.  HOME CARE INSTRUCTIONS   · Only take over-the-counter or prescription medicines for pain, discomfort, or fever as directed by your caregiver.  · Do not take aspirin. It can cause bleeding.  · Keep stitches dry when bathing.  · Protect the biopsy area. Do not let the area get bumped.  · Avoid activities that may pull the incision site open until approved by your caregiver. This can include stretching, reaching, exercise, sports, or lifting over 3 pounds.  · Resume your usual diet.  · Wear a good support bra for as long as directed by your caregiver.  · Change any bandages (dressings) as directed by your caregiver.  · Do not drink alcohol while taking pain medicine.  · Keep all your follow-up appointments with your caregiver. Ask when your test results will be ready. Make sure you  get your test results.  SEEK MEDICAL CARE IF:   · You have redness, swelling, or increasing pain in the biopsy site.  · You have a bad smell coming from the biopsy site or dressing.  · Your biopsy site breaks open after the stitches (sutures), staples, or skin adhesive strips have been removed.  · You have a rash.  · You need stronger medicine.  SEEK IMMEDIATE MEDICAL CARE IF:   · You have a fever.  · You have increased bleeding (more than a small spot) from the biopsy site.  · You have difficulty breathing.  · You have pus coming from the biopsy site.  MAKE SURE YOU:  · Understand these instructions.  · Will watch your condition.  · Will get help right away if you are not doing well or get worse.     This information is not intended to replace advice given to you by your health care provider. Make sure you discuss any questions you have with your health care provider.         DIET: To avoid nausea, slowly advance diet as tolerated, avoiding spicy or greasy foods for the first day.  Add more substantial food to your diet according to your physician's instructions.  Babies can be fed formula or breast milk as soon as they are hungry.  INCREASE FLUIDS AND FIBER TO AVOID CONSTIPATION.      You should CALL YOUR PHYSICIAN if you develop:  Fever greater than 101 degrees F.  Pain not relieved by medication, or persistent nausea or vomiting.  Excessive bleeding (blood soaking through dressing) or unexpected drainage from the wound.  Extreme redness or swelling around the incision site, drainage of pus or foul smelling drainage.  Inability to urinate or empty your bladder within 8 hours.  Problems with breathing or chest pain.    You should call 911 if you develop problems with breathing or chest pain.  If you are unable to contact your doctor or surgical center, you should go to the nearest emergency room or urgent care center.  Physician's telephone #: [phone].    If any questions arise, call your doctor.  If your  doctor is not available, please feel free to call the Surgical Center at [phone].  The Center is open Monday through Friday from 7AM to 7PM.  You can also call the HEALTH HOTLINE open 24 hours/day, 7 days/week and speak to a nurse at [phone], or toll free at [phone].    A registered nurse may call you a few days after your surgery to see how you are doing after your procedure.    MEDICATIONS: Resume taking daily medication.  Take prescribed pain medication with food.  If no medication is prescribed, you may take non-aspirin pain medication if needed.  PAIN MEDICATION CAN BE VERY CONSTIPATING.  Take a stool softener or laxative such as senokot, pericolace, or milk of magnesia if needed.    Prescription given for Norco.  Last pain medication given at 3:10 PM    If your physician has prescribed pain medication that includes Acetaminophen (Tylenol), do not take additional Acetaminophen (Tylenol) while taking the prescribed medication.    Depression / Suicide Risk    As you are discharged from this [facility] facility, it is important to learn how to keep safe from harming yourself.    Recognize the warning signs:  · Abrupt changes in personality, positive or negative- including increase in energy   · Giving away possessions  · Change in eating patterns- significant weight changes-  positive or negative  · Change in sleeping patterns- unable to sleep or sleeping all the time   · Unwillingness or inability to communicate  · Depression  · Unusual sadness, discouragement and loneliness  · Talk of wanting to die  · Neglect of personal appearance   · Rebelliousness- reckless behavior  · Withdrawal from people/activities they love  · Confusion- inability to concentrate     If you or a loved one observes any of these behaviors or has concerns about self-harm, here's what you can do:  · Talk about it- your feelings and reasons for harming yourself  · Remove any means that you might use to hurt yourself  (examples: pills, rope, extension cords, firearm)  · Get professional help from the community (Mental Health, Substance Abuse, psychological counseling)  · Do not be alone:Call your Safe Contact- someone whom you trust who will be there for you.  · Call your local CRISIS HOTLINE 870-6846 or 461-342-7485  · Call your local Children's Mobile Crisis Response Team [place] (815) 428-7776 or www.Awesome.me  · Call the toll free National Suicide Prevention Hotlines   · National Suicide Prevention Lifeline 271-636-WOQF (0021)  · National Hope Line Network 800-SUICIDE (009-1182)

## 2017-08-05 NOTE — OR NURSING
1610 - Received pt from PACU. AAOx4, Drsg CDI. Rates pain 0/10.   1620 - Ambulates with steady gate to restroom. Tolerating clears.    1645 - DC instructions explained. IV DC'd, pt DC'd in care of friend.

## 2017-08-07 RX ORDER — ONDANSETRON 4 MG/1
4 TABLET, FILM COATED ORAL EVERY 4 HOURS PRN
Qty: 20 TAB | Refills: 0 | Status: SHIPPED | OUTPATIENT
Start: 2017-08-07 | End: 2018-10-12

## 2017-08-07 RX ORDER — ZOLPIDEM TARTRATE 10 MG/1
10 TABLET ORAL NIGHTLY PRN
Qty: 30 TAB | Refills: 1 | Status: SHIPPED | OUTPATIENT
Start: 2017-08-07 | End: 2017-10-26 | Stop reason: SDUPTHER

## 2017-08-10 NOTE — ADDENDUM NOTE
Encounter addended by: Deborah Sánchez on: 8/10/2017  9:29 AM<BR>     Documentation filed: Clinical Notes

## 2017-08-10 NOTE — ADDENDUM NOTE
Encounter addended by: Deborah Sánchez on: 8/10/2017 12:50 PM<BR>     Documentation filed: Clinical Notes

## 2017-08-10 NOTE — DOCUMENTATION QUERY
DOCUMENTATION QUERY    PROVIDERS: Please select “Cosign w/ note”to reply to query.    To better represent the severity of illness of your patient, please review the following information and exercise your independent professional judgment in responding to this query.     Documentation clarity in OP rpoert is : The mass   was found, it was excised, was in continuity with the implant capsule which   was removed with it.   in the Operative report. Based upon the clinical findings,, can this part of the operative report be further specified?Did the entire implant come out with mass?  Please add an addendum describing the removal of the implant, if applicable.        The medical record reflects the following:   Clinical Findings    Treatment    Risk Factors    Location within medical record  Operative report     Thank you,   Deborah Sánchez  [facility] dept    Ext [phone]

## 2017-08-11 NOTE — ADDENDUM NOTE
Encounter addended by: Teodora Blas on: 8/11/2017 12:21 PM<BR>     Documentation filed: Clinical Notes

## 2017-08-11 NOTE — DOCUMENTATION QUERY
"DOCUMENTATION QUERY    DR. HERNANDEZ, please review this Documentation Query and reply with an addendum. You co-signed only, which does not address the needed clarification from you to be able to code and complete this account.      To better represent the severity of illness of your patient, please review the following information and exercise your independent professional judgment in responding to this query.     Documentation clarity in OP report is needed.      \"The mass was found, it was excised, was in continuity with the implant capsule which   was removed with it.\"    Based upon the clinical findings, can this part of the operative report be further specified?  Did the entire implant come out with mass?  Please add an addendum describing the removal of the implant, if applicable.        The medical record reflects the following:    Clinical Findings      Treatment      Risk Factors      Location within medical record   Operative report      Thank you,    Deborah Sánchez  [facility] dept    Ext [phone]        "

## 2017-08-12 NOTE — ADDENDUM NOTE
Encounter addended by: Ngoc Simmons M.D. on: 8/11/2017  5:45 PM<BR>     Documentation filed: Clinical Notes

## 2017-09-21 ENCOUNTER — TELEPHONE (OUTPATIENT)
Dept: OCCUPATIONAL MEDICINE | Facility: CLINIC | Age: 32
End: 2017-09-21

## 2017-09-28 ENCOUNTER — EH NON-PROVIDER (OUTPATIENT)
Dept: OCCUPATIONAL MEDICINE | Facility: CLINIC | Age: 32
End: 2017-09-28

## 2017-09-28 DIAGNOSIS — Z29.89 NEED FOR ISOLATION: ICD-10-CM

## 2017-09-28 PROCEDURE — 94375 RESPIRATORY FLOW VOLUME LOOP: CPT

## 2017-10-07 ENCOUNTER — IMMUNIZATION (OUTPATIENT)
Dept: OCCUPATIONAL MEDICINE | Facility: CLINIC | Age: 32
End: 2017-10-07

## 2017-10-07 DIAGNOSIS — Z23 NEED FOR VACCINATION: ICD-10-CM

## 2017-10-07 PROCEDURE — 90686 IIV4 VACC NO PRSV 0.5 ML IM: CPT | Performed by: PREVENTIVE MEDICINE

## 2017-10-26 ENCOUNTER — OFFICE VISIT (OUTPATIENT)
Dept: MEDICAL GROUP | Facility: MEDICAL CENTER | Age: 32
End: 2017-10-26
Payer: COMMERCIAL

## 2017-10-26 VITALS
TEMPERATURE: 97.5 F | HEART RATE: 54 BPM | SYSTOLIC BLOOD PRESSURE: 108 MMHG | OXYGEN SATURATION: 100 % | BODY MASS INDEX: 22.82 KG/M2 | DIASTOLIC BLOOD PRESSURE: 70 MMHG | HEIGHT: 66 IN | WEIGHT: 142 LBS

## 2017-10-26 DIAGNOSIS — G47.9 SLEEP DISORDER: ICD-10-CM

## 2017-10-26 DIAGNOSIS — F41.9 ANXIETY: ICD-10-CM

## 2017-10-26 PROCEDURE — 99214 OFFICE O/P EST MOD 30 MIN: CPT | Performed by: INTERNAL MEDICINE

## 2017-10-26 RX ORDER — ZOLPIDEM TARTRATE 10 MG/1
10 TABLET ORAL NIGHTLY PRN
Qty: 30 TAB | Refills: 2 | Status: SHIPPED
Start: 2017-10-26 | End: 2018-03-05 | Stop reason: SDUPTHER

## 2017-10-26 RX ORDER — ALPRAZOLAM 0.25 MG/1
0.25 TABLET ORAL 2 TIMES DAILY PRN
Qty: 40 TAB | Refills: 2 | Status: SHIPPED
Start: 2017-10-26 | End: 2018-05-31 | Stop reason: SDUPTHER

## 2017-10-27 NOTE — PROGRESS NOTES
CC: Nickie Martin is a 31 y.o. 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   Also, complains of intermittent trouble relaxing and being restless and not being able to control worrying.  The last Xanax refill was given on 7/26/17, 40 tablets, one refill.    Depression screen:   1. Little interest or pleasure in doing things:  0  2. Feeling down, depressed, or hopeless:   0      Shift work sleep disorder  The patient has a trouble to go to sleep, sometimes awake early.   She works different shifts.  Used ambien, needs refill.     Current Outpatient Prescriptions   Medication Sig Dispense Refill   • alprazolam (XANAX) 0.25 MG Tab Take 1 Tab by mouth 2 times a day as needed for Sleep. 40 Tab 2   • zolpidem (AMBIEN) 10 MG Tab Take 1 Tab by mouth at bedtime as needed for Sleep. 30 Tab 2   • ondansetron (ZOFRAN) 4 MG Tab tablet Take 1 Tab by mouth every four hours as needed for Nausea/Vomiting. 20 Tab 0   • B Complex Vitamins (VITAMIN B COMPLEX) Tab Take 1 Tab by mouth every day.     • ibuprofen (MOTRIN) 200 MG Tab Take 400 mg by mouth 1 time daily as needed.     • multivitamin (THERAGRAN) Tab Take 1 Tab by mouth every day.     • albuterol 108 (90 BASE) MCG/ACT Aero Soln inhalation aerosol Inhale 2 Puffs by mouth every 6 hours as needed. 8.5 g 2     No current facility-administered medications for this visit.      She  has a past medical history of Anesthesia; ASTHMA; Cholesteatoma; and Insomnia.  She  has a past surgical history that includes anesth,ear surgery (2000); mammoplasty augmentation (4/18/2012); enlarge breast with implant; and breast biopsy (Right, 8/4/2017).  Social History   Substance Use Topics   • Smoking status: Never Smoker   • Smokeless tobacco: Never Used   • Alcohol use 0.6 oz/week     1 Standard drinks or equivalent per week      Comment: 3-4 per week     Social History     Social History Narrative     "Lives with .     Children: 1    Work: [employer] RN     Family History   Problem Relation Age of Onset   • Allergies Paternal Grandfather      asthma   • Diabetes Paternal Grandmother    • Hypertension Maternal Grandmother    • Cancer Neg Hx    • Heart Disease Neg Hx    • Hyperlipidemia Neg Hx    • Stroke Neg Hx    • Thyroid Neg Hx      Family Status   Relation Status   • Paternal Grandfather    • Paternal Grandmother    • Maternal Grandmother    • Neg Hx      ROS   Taking supplements to help mood or symptoms: yes  Using alcohol or other substances to help mood or symptoms: no  No polydipsia, polyuria.  No temperature intolerance.  No bowel changes  Denies symptoms of bismark: grandiosity, euphoria, need for little or no sleep, rapid pressured speech, spending sprees, reckless or risky behavior, hypersexual behavior.   No visual or auditory hallucinations.    PHYSICAL EXAM   Blood pressure 108/70, pulse (!) 54, temperature 36.4 °C (97.5 °F), height 1.676 m (5' 6\"), weight 64.4 kg (142 lb), last menstrual period 10/06/2017, SpO2 100 %, not currently breastfeeding.  General: normal speech pattern and content   Clothing and grooming normal.  Behavior: no psychomotor abnormalities or impulsivity  Eye contact: good   Affect: normal  Though content: normal insight and reasoning, no evidence of psychotic process.   Neck/Thyroid: No adenopathy, no palpable thyroid nodules.  Lungs: CTAB  Heart: RRR no ectopy  Neuro: Gait normal. Reflexes normal and symmetric. Sensation grossly intact        Abnormal findings: none    ASSESMENT AND PLAN     1. Anxiety  Refill:  - alprazolam (XANAX) 0.25 MG Tab; Take 1 Tab by mouth 2 times a day as needed for Sleep.  Dispense: 40 Tab; Refill: 2  May need SSRI, that was reviewed.     2. Sleep disorder  She was given Ambien refill.    Smoking Counseling: Nonsmoker    Follow up: in 3 months, earlier prn    Please note that this dictation was created using voice recognition software. Despite all " efforts, some minor grammar mistakes are possible.

## 2018-01-10 DIAGNOSIS — H71.90 CHOLESTEATOMA, UNSPECIFIED LATERALITY: ICD-10-CM

## 2018-02-27 ENCOUNTER — TELEPHONE (OUTPATIENT)
Dept: MEDICAL GROUP | Facility: MEDICAL CENTER | Age: 33
End: 2018-02-27

## 2018-02-27 NOTE — TELEPHONE ENCOUNTER
ESTABLISHED PATIENT PRE-VISIT PLANNING     Note: Patient will not be contacted if there is no indication to call.     1.  Reviewed notes from the last few office visits within the medical group: Yes 06/13/2017    2.  If any orders were placed at last visit or intended to be done for this visit (i.e. 6 mos follow-up), do we have Results/Consult Notes?        •  Labs - Labs were not ordered at last office visit.   Note: If patient appointment is for lab review and patient did not complete labs, check with provider if OK to reschedule patient until labs completed.       •  Imaging - Imaging ordered, completed and results are in chart.       •  Referrals - No referrals were ordered at last office visit.    3. Is this appointment scheduled as a Hospital Follow-Up? No    4.  Immunizations were updated in Epic using WebIZ?: Yes       •  Web Iz Recommendations: Patient is up to date on all vaccines    5.  Patient is due for the following Health Maintenance Topics:   There are no preventive care reminders to display for this patient.      6.  Patient was NOT informed to arrive 15 min prior to their scheduled appointment and bring in their medication bottles.

## 2018-03-05 ENCOUNTER — HOSPITAL ENCOUNTER (OUTPATIENT)
Facility: MEDICAL CENTER | Age: 33
End: 2018-03-05
Attending: INTERNAL MEDICINE
Payer: COMMERCIAL

## 2018-03-05 ENCOUNTER — OFFICE VISIT (OUTPATIENT)
Dept: MEDICAL GROUP | Facility: MEDICAL CENTER | Age: 33
End: 2018-03-05
Payer: COMMERCIAL

## 2018-03-05 VITALS
HEIGHT: 66 IN | TEMPERATURE: 98.6 F | OXYGEN SATURATION: 97 % | DIASTOLIC BLOOD PRESSURE: 62 MMHG | WEIGHT: 148.37 LBS | HEART RATE: 70 BPM | BODY MASS INDEX: 23.84 KG/M2 | SYSTOLIC BLOOD PRESSURE: 108 MMHG

## 2018-03-05 DIAGNOSIS — Z00.00 HEALTH CARE MAINTENANCE: ICD-10-CM

## 2018-03-05 DIAGNOSIS — Z79.899 CONTROLLED SUBSTANCE AGREEMENT SIGNED: ICD-10-CM

## 2018-03-05 DIAGNOSIS — G47.26 SHIFT WORK SLEEP DISORDER: ICD-10-CM

## 2018-03-05 PROCEDURE — 80307 DRUG TEST PRSMV CHEM ANLYZR: CPT

## 2018-03-05 PROCEDURE — 99214 OFFICE O/P EST MOD 30 MIN: CPT | Performed by: INTERNAL MEDICINE

## 2018-03-05 RX ORDER — ZOLPIDEM TARTRATE 10 MG/1
10 TABLET ORAL NIGHTLY PRN
Qty: 30 TAB | Refills: 2 | Status: SHIPPED | OUTPATIENT
Start: 2018-03-05 | End: 2018-05-31 | Stop reason: SDUPTHER

## 2018-03-06 DIAGNOSIS — Z79.899 CONTROLLED SUBSTANCE AGREEMENT SIGNED: ICD-10-CM

## 2018-03-06 DIAGNOSIS — G47.26 SHIFT WORK SLEEP DISORDER: ICD-10-CM

## 2018-03-06 NOTE — PROGRESS NOTES
CC: Nickie Martin is a 32 y.o. female who presents for follow up of the following     Shift work sleep disorder  The patient works different shifts, and he has a trouble to go to sleep.  Controlled with ambien, as needed, stable.   Discussed sleep hygiene, to follow as much as possible:   - Go to bed and wake up at consistent times whether work/school day or not.   - Keep room dark, quiet, and comfortable.   - Don't have naps.  - Avoid stimulant or caffeine use more than 4 hours after wake time.   - Avoid meal or exercise 2-3 hours prior to going to bed.    Current Outpatient Prescriptions   Medication Sig Dispense Refill   • zolpidem (AMBIEN) 10 MG Tab Take 1 Tab by mouth at bedtime as needed for Sleep for up to 90 days. 30 Tab 2   • alprazolam (XANAX) 0.25 MG Tab Take 1 Tab by mouth 2 times a day as needed for Sleep. 40 Tab 2   • ondansetron (ZOFRAN) 4 MG Tab tablet Take 1 Tab by mouth every four hours as needed for Nausea/Vomiting. 20 Tab 0   • B Complex Vitamins (VITAMIN B COMPLEX) Tab Take 1 Tab by mouth every day.     • ibuprofen (MOTRIN) 200 MG Tab Take 400 mg by mouth 1 time daily as needed.     • multivitamin (THERAGRAN) Tab Take 1 Tab by mouth every day.     • albuterol 108 (90 BASE) MCG/ACT Aero Soln inhalation aerosol Inhale 2 Puffs by mouth every 6 hours as needed. 8.5 g 2     No current facility-administered medications for this visit.      She  has a past medical history of Anesthesia; ASTHMA; Cholesteatoma; and Insomnia.  She  has a past surgical history that includes pr anesth,ear surgery (2000); mammoplasty augmentation (4/18/2012); pr enlarge breast with implant; and breast biopsy (Right, 8/4/2017).  Social History   Substance Use Topics   • Smoking status: Never Smoker   • Smokeless tobacco: Never Used   • Alcohol use 0.6 oz/week     1 Standard drinks or equivalent per week      Comment: 3-4 per week     Social History     Social History Narrative    Lives with .     Children: 1    Work:  "[employer] RN     Family History   Problem Relation Age of Onset   • Allergies Paternal Grandfather      asthma   • Diabetes Paternal Grandmother    • Hypertension Maternal Grandmother    • Cancer Neg Hx    • Heart Disease Neg Hx    • Hyperlipidemia Neg Hx    • Stroke Neg Hx    • Thyroid Neg Hx      Family Status   Relation Status   • Paternal Grandfather    • Paternal Grandmother    • Maternal Grandmother    • Neg Hx      ROS   Taking supplements to help mood or symptoms: yes  Using alcohol or other substances to help mood or symptoms: no  No polydipsia, polyuria.  No temperature intolerance.  No bowel changes  Denies symptoms of bismark: grandiosity, euphoria, need for little or no sleep, rapid pressured speech, spending sprees, reckless or risky behavior, hypersexual behavior.   No visual or auditory hallucinations.    Labs     None.     PHYSICAL EXAM   Blood pressure 108/62, pulse 70, temperature 37 °C (98.6 °F), height 1.676 m (5' 6\"), weight 67.3 kg (148 lb 5.9 oz), SpO2 97 %.  General: normal speech pattern and content   Clothing and grooming normal.  Behavior: no psychomotor abnormalities or impulsivity  Eye contact: good  Affect: normal  Though content: normal insight and reasoning, no evidence of psychotic process.   Neck/Thyroid: No adenopathy, no palpable thyroid nodules.  Lungs: CTAB  Heart: RRR no ectopy  Neuro: Gait normal. Reflexes normal and symmetric. Sensation grossly intact        Abnormal findings: none    ASSESMENT AND PLAN     1. Shift work sleep disorder  Controlled Substance Treatment Agreement    URINE DRUG SCREEN    zolpidem (AMBIEN) 10 MG Tab   2. Controlled substance agreement signed  Controlled Substance Treatment Agreement    URINE DRUG SCREEN   3. Health care maintenance  UTD     - Reviewed effects and side effects of medication, the patient verbalized understanding   - Kandace done.    Smoking Counseling: Nonsmoker    Follow up: in 3 months    Please note that this dictation was created " using voice recognition software. Despite all efforts, some minor grammar mistakes are possible.

## 2018-03-07 LAB
AMPHETAMINES UR QL: NEGATIVE NG/ML
BARBITURATES UR QL: NEGATIVE NG/ML
BENZODIAZ UR QL: NEGATIVE NG/ML
CANNABINOIDS UR QL SCN: NEGATIVE NG/ML
COCAINE UR QL: NEGATIVE NG/ML
DRUG SCREEN COMMENT UR-IMP: NORMAL
MDMA CTO UR SCN-MCNC: NEGATIVE NG/ML
METHADONE UR QL: NEGATIVE NG/ML
OPIATES UR QL: NEGATIVE NG/ML
OXYCODONE CTO UR SCN-MCNC: NEGATIVE NG/ML
PCP UR QL SCN: NEGATIVE NG/ML
PROPOXYPH UR QL: NEGATIVE NG/ML

## 2018-03-28 ENCOUNTER — APPOINTMENT (OUTPATIENT)
Dept: MEDICAL GROUP | Facility: MEDICAL CENTER | Age: 33
End: 2018-03-28
Payer: COMMERCIAL

## 2018-04-16 DIAGNOSIS — B37.9 YEAST INFECTION: ICD-10-CM

## 2018-04-16 RX ORDER — FLUCONAZOLE 150 MG/1
150 TABLET ORAL DAILY
Qty: 1 TAB | Refills: 0 | Status: SHIPPED | OUTPATIENT
Start: 2018-04-16 | End: 2018-10-12

## 2018-05-31 ENCOUNTER — OFFICE VISIT (OUTPATIENT)
Dept: MEDICAL GROUP | Facility: MEDICAL CENTER | Age: 33
End: 2018-05-31
Payer: COMMERCIAL

## 2018-05-31 VITALS
BODY MASS INDEX: 23.56 KG/M2 | OXYGEN SATURATION: 100 % | TEMPERATURE: 97.4 F | WEIGHT: 146.61 LBS | HEART RATE: 54 BPM | HEIGHT: 66 IN | SYSTOLIC BLOOD PRESSURE: 120 MMHG | DIASTOLIC BLOOD PRESSURE: 62 MMHG

## 2018-05-31 DIAGNOSIS — F41.9 ANXIETY: ICD-10-CM

## 2018-05-31 DIAGNOSIS — G47.26 SHIFT WORK SLEEP DISORDER: ICD-10-CM

## 2018-05-31 DIAGNOSIS — Z00.00 HEALTH CARE MAINTENANCE: ICD-10-CM

## 2018-05-31 DIAGNOSIS — Z79.899 CONTROLLED SUBSTANCE AGREEMENT SIGNED: ICD-10-CM

## 2018-05-31 PROCEDURE — 99214 OFFICE O/P EST MOD 30 MIN: CPT | Performed by: INTERNAL MEDICINE

## 2018-05-31 RX ORDER — ALPRAZOLAM 0.25 MG/1
0.25 TABLET ORAL 2 TIMES DAILY PRN
Qty: 40 TAB | Refills: 2 | Status: SHIPPED | OUTPATIENT
Start: 2018-05-31 | End: 2018-08-29

## 2018-05-31 RX ORDER — ZOLPIDEM TARTRATE 10 MG/1
10 TABLET ORAL NIGHTLY PRN
Qty: 30 TAB | Refills: 2 | Status: SHIPPED
Start: 2018-05-31 | End: 2018-08-29

## 2018-05-31 ASSESSMENT — PATIENT HEALTH QUESTIONNAIRE - PHQ9: CLINICAL INTERPRETATION OF PHQ2 SCORE: 0

## 2018-05-31 NOTE — PROGRESS NOTES
CC: Nickie Martin is a 32 y.o. female who presents for follow up of the following     Shift work sleep disorder  Interval course:  No significant change, controlled sleep disordered with Ambien 10 mg, needs a refill    The patient works different shifts, and he has a trouble to go to sleep.  Controlled with ambien, as needed, stable.   Discussed sleep hygiene, to follow as much as possible:   - Go to bed and wake up at consistent times whether work/school day or not.   - Keep room dark, quiet, and comfortable.   - Don't have naps.  - Avoid stimulant or caffeine use more than 4 hours after wake time.   - Avoid meal or exercise 2-3 hours prior to going to bed.    Current Outpatient Prescriptions   Medication Sig Dispense Refill   • zolpidem (AMBIEN) 10 MG Tab Take 1 Tab by mouth at bedtime as needed for Sleep for up to 90 days. 30 Tab 2   • fluconazole (DIFLUCAN) 150 MG tablet Take 1 Tab by mouth every day. 1 Tab 0   • alprazolam (XANAX) 0.25 MG Tab Take 1 Tab by mouth 2 times a day as needed for Sleep. 40 Tab 2   • ondansetron (ZOFRAN) 4 MG Tab tablet Take 1 Tab by mouth every four hours as needed for Nausea/Vomiting. 20 Tab 0   • B Complex Vitamins (VITAMIN B COMPLEX) Tab Take 1 Tab by mouth every day.     • ibuprofen (MOTRIN) 200 MG Tab Take 400 mg by mouth 1 time daily as needed.     • multivitamin (THERAGRAN) Tab Take 1 Tab by mouth every day.     • albuterol 108 (90 BASE) MCG/ACT Aero Soln inhalation aerosol Inhale 2 Puffs by mouth every 6 hours as needed. 8.5 g 2     No current facility-administered medications for this visit.      She  has a past medical history of Anesthesia; ASTHMA; Cholesteatoma; and Insomnia.  She  has a past surgical history that includes pr anesth,ear surgery (2000); mammoplasty augmentation (4/18/2012); pr enlarge breast with implant; and breast biopsy (Right, 8/4/2017).  Social History   Substance Use Topics   • Smoking status: Never Smoker   • Smokeless tobacco: Never Used   • Alcohol  "use 0.6 oz/week     1 Standard drinks or equivalent per week      Comment: 3-4 per week     Social History     Social History Narrative    Lives with .     Children: 1    Work: [employer] RN     Family History   Problem Relation Age of Onset   • Allergies Paternal Grandfather      asthma   • Diabetes Paternal Grandmother    • Hypertension Maternal Grandmother    • Cancer Neg Hx    • Heart Disease Neg Hx    • Hyperlipidemia Neg Hx    • Stroke Neg Hx    • Thyroid Neg Hx      Family Status   Relation Status   • Paternal Grandfather    • Paternal Grandmother    • Maternal Grandmother    • Neg Hx      ROS   Taking supplements to help mood or symptoms: yes  Using alcohol or other substances to help mood or symptoms: no  No polydipsia, polyuria.  No temperature intolerance.  No bowel changes  Denies symptoms of bismark: grandiosity, euphoria, need for little or no sleep, rapid pressured speech, spending sprees, reckless or risky behavior, hypersexual behavior.   No visual or auditory hallucinations.    Labs     None.     PHYSICAL EXAM   /62   Pulse (!) 54   Temp 36.3 °C (97.4 °F)   Ht 1.676 m (5' 6\")   Wt 66.5 kg (146 lb 9.7 oz)   SpO2 100%   BMI 23.66 kg/m²   General: normal speech pattern and content   Clothing and grooming normal.  Behavior: no psychomotor abnormalities or impulsivity  Eye contact: good  Affect: normal  Though content: normal insight and reasoning, no evidence of psychotic process.   Neck/Thyroid: No adenopathy, no palpable thyroid nodules.  Lungs: CTAB  Heart: RRR no ectopy  Neuro: Gait normal. Reflexes normal and symmetric. Sensation grossly intact        Abnormal findings: none    ASSESMENT AND PLAN     1. Shift work sleep disorder  - zolpidem (AMBIEN) 10 MG Tab; Take 1 Tab by mouth at bedtime as needed for Sleep for up to 90 days.  Dispense: 30 Tab; Refill: 2  Controlled, continue current treatment    2. Controlled substance agreement signed  Obtained and reviewed patient utilization " report from [facility] pharmacy database on 5/31/2018 1:01 PM  prior to writing prescription for controlled substance II, III or IV per [place] bill . Based on assessment of the report, the prescription is medically necessary.     3. Anxiety  - ALPRAZolam (XANAX) 0.25 MG Tab; Take 1 Tab by mouth 2 times a day as needed for Sleep for up to 90 days.  Dispense: 40 Tab; Refill: 2    4. Health care maintenance  UTD    Smoking Counseling: Nonsmoker    Follow up: in 3 months    Please note that this dictation was created using voice recognition software. Despite all efforts, some minor grammar mistakes are possible.

## 2018-06-18 ENCOUNTER — TELEPHONE (OUTPATIENT)
Dept: MEDICAL GROUP | Facility: MEDICAL CENTER | Age: 33
End: 2018-06-18

## 2018-06-18 NOTE — TELEPHONE ENCOUNTER
Phone Number Called: [phone] (home)     Message: Received fax for refill from [facility] for Zolpidem refill. Called pharmacy and they have the rx that was sent on 5/31/18 with 2 refills ready for pt .  Called pt to notify, LM.    Left Message for patient to call back: yes

## 2018-09-21 ENCOUNTER — DOCUMENTATION (OUTPATIENT)
Dept: OCCUPATIONAL MEDICINE | Facility: CLINIC | Age: 33
End: 2018-09-21

## 2018-09-30 ENCOUNTER — IMMUNIZATION (OUTPATIENT)
Dept: OCCUPATIONAL MEDICINE | Facility: CLINIC | Age: 33
End: 2018-09-30

## 2018-09-30 DIAGNOSIS — Z23 NEED FOR VACCINATION: ICD-10-CM

## 2018-10-05 ENCOUNTER — EH NON-PROVIDER (OUTPATIENT)
Dept: OCCUPATIONAL MEDICINE | Facility: CLINIC | Age: 33
End: 2018-10-05

## 2018-10-05 DIAGNOSIS — Z02.89 ENCOUNTER FOR OCCUPATIONAL HEALTH EXAMINATION INVOLVING RESPIRATOR: ICD-10-CM

## 2018-10-05 PROCEDURE — 94010 BREATHING CAPACITY TEST: CPT | Performed by: PREVENTIVE MEDICINE

## 2018-10-05 PROCEDURE — 94375 RESPIRATORY FLOW VOLUME LOOP: CPT | Performed by: PREVENTIVE MEDICINE

## 2018-10-10 PROCEDURE — 90686 IIV4 VACC NO PRSV 0.5 ML IM: CPT | Performed by: PREVENTIVE MEDICINE

## 2018-10-10 RX ORDER — BREAST PUMP
EACH MISCELLANEOUS
COMMUNITY
End: 2018-10-12

## 2018-10-10 RX ORDER — AZITHROMYCIN 250 MG/1
TABLET, FILM COATED ORAL
COMMUNITY
Start: 2015-03-18 | End: 2018-10-12

## 2018-10-10 RX ORDER — ZOLPIDEM TARTRATE 5 MG/1
TABLET ORAL
COMMUNITY
Start: 2015-10-13 | End: 2018-10-12

## 2018-10-10 RX ORDER — EPINEPHRINE 0.3 MG/.3ML
INJECTION SUBCUTANEOUS
COMMUNITY
Start: 2014-06-18 | End: 2022-07-28 | Stop reason: SDUPTHER

## 2018-10-10 RX ORDER — DOCUSATE SODIUM 100 MG/1
100 CAPSULE, LIQUID FILLED ORAL
COMMUNITY
End: 2018-10-12

## 2018-10-10 RX ORDER — RIFAMPIN 300 MG/1
600 CAPSULE ORAL
COMMUNITY
Start: 2015-04-20 | End: 2018-10-12

## 2018-10-10 RX ORDER — TRIAMCINOLONE ACETONIDE 1 MG/G
OINTMENT TOPICAL
COMMUNITY
Start: 2016-06-22 | End: 2018-10-12

## 2018-10-10 RX ORDER — ALBUTEROL SULFATE 90 UG/1
2 AEROSOL, METERED RESPIRATORY (INHALATION)
COMMUNITY
Start: 2015-04-30 | End: 2018-10-12

## 2018-10-10 RX ORDER — METOCLOPRAMIDE 10 MG/1
10 TABLET ORAL
COMMUNITY
Start: 2014-12-22 | End: 2018-10-12

## 2018-10-12 ENCOUNTER — OFFICE VISIT (OUTPATIENT)
Dept: MEDICAL GROUP | Facility: MEDICAL CENTER | Age: 33
End: 2018-10-12
Payer: COMMERCIAL

## 2018-10-12 VITALS
OXYGEN SATURATION: 98 % | RESPIRATION RATE: 14 BRPM | HEART RATE: 68 BPM | HEIGHT: 66 IN | TEMPERATURE: 97.5 F | SYSTOLIC BLOOD PRESSURE: 118 MMHG | BODY MASS INDEX: 23.38 KG/M2 | WEIGHT: 145.5 LBS | DIASTOLIC BLOOD PRESSURE: 72 MMHG

## 2018-10-12 DIAGNOSIS — G47.26 SHIFT WORK SLEEP DISORDER: ICD-10-CM

## 2018-10-12 DIAGNOSIS — Z91.030 BEE STING ALLERGY: ICD-10-CM

## 2018-10-12 DIAGNOSIS — Z00.00 HEALTH CARE MAINTENANCE: ICD-10-CM

## 2018-10-12 DIAGNOSIS — J45.990 EXERCISE-INDUCED ASTHMA: ICD-10-CM

## 2018-10-12 DIAGNOSIS — Z79.899 CONTROLLED SUBSTANCE AGREEMENT SIGNED: ICD-10-CM

## 2018-10-12 DIAGNOSIS — H71.91 CHOLESTEATOMA OF RIGHT EAR: ICD-10-CM

## 2018-10-12 DIAGNOSIS — Z98.890 H/O LUMPECTOMY: ICD-10-CM

## 2018-10-12 PROBLEM — R92.8 ABNORMAL MAMMOGRAM: Status: RESOLVED | Noted: 2017-08-04 | Resolved: 2018-10-12

## 2018-10-12 PROCEDURE — 99214 OFFICE O/P EST MOD 30 MIN: CPT | Performed by: INTERNAL MEDICINE

## 2018-10-12 RX ORDER — ZOLPIDEM TARTRATE 10 MG/1
TABLET ORAL
COMMUNITY
Start: 2018-09-01 | End: 2018-10-12 | Stop reason: SDUPTHER

## 2018-10-12 RX ORDER — ZOLPIDEM TARTRATE 10 MG/1
10 TABLET ORAL NIGHTLY PRN
Qty: 30 TAB | Refills: 2 | Status: SHIPPED
Start: 2018-10-12 | End: 2019-01-22 | Stop reason: SDUPTHER

## 2018-10-12 RX ORDER — ALPRAZOLAM 0.25 MG/1
TABLET ORAL
COMMUNITY
Start: 2018-09-30 | End: 2018-10-12

## 2018-10-13 NOTE — PROGRESS NOTES
CC: Nickie Martin is a 32 y.o. female who presents for follow up of the following     Shift work sleep disorder  Interval course:  No significant change,  -  controlled sleep disordered with Ambien 10 mg, needs a refill.     The patient works different shifts, and he has a trouble to go to sleep.  Controlled with ambien, as needed, stable.   Discussed sleep hygiene, to follow as much as possible:   - Go to bed and wake up at consistent times whether work/school day or not.   - Keep room dark, quiet, and comfortable.   - Don't have naps.  - Avoid stimulant or caffeine use more than 4 hours after wake time.   - Avoid meal or exercise 2-3 hours prior to going to bed.    Exercise-induced asthma  Stable, controlled with albuterol as needed:  - no recent use.      Right cholesteatoma  Currently asymptomatic, stable, f/u by ENT.    Bee sting allergy  She has prescribed EpiPen.    H/o lumpectomy (Right breast lump)  Dg: in 6/2017.  Negative/benign bx.   St post lumpectomy.    Current Outpatient Prescriptions   Medication Sig Dispense Refill   • zolpidem (AMBIEN) 10 MG Tab Take 1 Tab by mouth at bedtime as needed for Sleep for up to 90 days. 30 Tab 2   • EPINEPHrine (EPIPEN) 0.3 MG/0.3ML Solution Auto-injector solution for injection Inject by intramuscular route once as needed for anaphylaxis.     • ibuprofen (MOTRIN) 200 MG Tab Take 400 mg by mouth 1 time daily as needed.     • multivitamin (THERAGRAN) Tab Take 1 Tab by mouth every day.       No current facility-administered medications for this visit.      She  has a past medical history of Anesthesia; ASTHMA; Cholesteatoma; and Insomnia.  She  has a past surgical history that includes pr anesth,ear surgery (2000); mammoplasty augmentation (4/18/2012); pr enlarge breast with implant; and breast biopsy (Right, 8/4/2017).  Social History   Substance Use Topics   • Smoking status: Never Smoker   • Smokeless tobacco: Never Used   • Alcohol use 0.6 oz/week     1 Standard drinks  "or equivalent per week      Comment: 3-4 per week     Social History     Social History Narrative    Lives with .     Children: 1    Work: [employer] RN     Family History   Problem Relation Age of Onset   • Allergies Paternal Grandfather         asthma   • Diabetes Paternal Grandmother    • Hypertension Maternal Grandmother    • Cancer Neg Hx    • Heart Disease Neg Hx    • Hyperlipidemia Neg Hx    • Stroke Neg Hx    • Thyroid Neg Hx      Family Status   Relation Status   • PGFa (Not Specified)   • PGMo (Not Specified)   • MGMo (Not Specified)   • Neg Hx (Not Specified)     ROS   Taking supplements to help mood or symptoms: yes  Using alcohol or other substances to help mood or symptoms: no  No polydipsia, polyuria.  No temperature intolerance.  No bowel changes  Denies symptoms of bismark: grandiosity, euphoria, need for little or no sleep, rapid pressured speech, spending sprees, reckless or risky behavior, hypersexual behavior.   No visual or auditory hallucinations.    Labs     None.     PHYSICAL EXAM   Blood pressure 118/72, pulse 68, temperature 36.4 °C (97.5 °F), temperature source Temporal, resp. rate 14, height 1.676 m (5' 5.98\"), weight 66 kg (145 lb 8.1 oz), SpO2 98 %, not currently breastfeeding.  General: normal speech pattern and content   Clothing and grooming normal.  Behavior: no psychomotor abnormalities or impulsivity  Eye contact: good  Affect: normal  Though content: normal insight and reasoning, no evidence of psychotic process.   Neck/Thyroid: No adenopathy, no palpable thyroid nodules.  Lungs: CTAB  Heart: RRR no ectopy  Neuro: Gait normal. Reflexes normal and symmetric. Sensation grossly intact        Abnormal findings: none    ASSESMENT AND PLAN     1. Shift work sleep disorder  Refill:  - zolpidem (AMBIEN) 10 MG Tab; Take 1 Tab by mouth at bedtime as needed for Sleep for up to 90 days.  Dispense: 30 Tab; Refill: 2    2. Controlled substance agreement signed  Obtained and reviewed patient " utilization report from [facility] pharmacy database on 10/12/2018 7:25 PM  prior to writing prescription for controlled substance II, III or IV per [place] bill . Based on assessment of the report, the prescription is medically necessary.     3. Exercise-induced asthma  Controlled, continue albuterol as needed    4. Cholesteatoma of right ear  Stable, currently asymptomatic continue ENT follow-up    5. Bee sting allergy  Advised to keep up by herself EpiPen    6. H/O lumpectomy  Benign, advised breast self-exam    7. Health care maintenance  Up to date    Smoking Counseling: Nonsmoker    Follow up: in 3 months    Please note that this dictation was created using voice recognition software. Despite all efforts, some minor grammar mistakes are possible.

## 2019-01-22 ENCOUNTER — OFFICE VISIT (OUTPATIENT)
Dept: MEDICAL GROUP | Facility: MEDICAL CENTER | Age: 34
End: 2019-01-22
Payer: COMMERCIAL

## 2019-01-22 VITALS
HEART RATE: 73 BPM | BODY MASS INDEX: 24.84 KG/M2 | OXYGEN SATURATION: 95 % | DIASTOLIC BLOOD PRESSURE: 72 MMHG | SYSTOLIC BLOOD PRESSURE: 102 MMHG | TEMPERATURE: 98.8 F | HEIGHT: 66 IN | WEIGHT: 154.54 LBS

## 2019-01-22 DIAGNOSIS — Z00.00 HEALTH CARE MAINTENANCE: ICD-10-CM

## 2019-01-22 DIAGNOSIS — Z12.83 SKIN CANCER SCREENING: ICD-10-CM

## 2019-01-22 DIAGNOSIS — G47.26 SHIFT WORK SLEEP DISORDER: ICD-10-CM

## 2019-01-22 DIAGNOSIS — H71.91 CHOLESTEATOMA OF RIGHT EAR: ICD-10-CM

## 2019-01-22 PROCEDURE — 99214 OFFICE O/P EST MOD 30 MIN: CPT | Performed by: INTERNAL MEDICINE

## 2019-01-22 RX ORDER — ZOLPIDEM TARTRATE 10 MG/1
10 TABLET ORAL NIGHTLY PRN
Qty: 30 TAB | Refills: 2 | Status: SHIPPED
Start: 2019-01-22 | End: 2019-04-29 | Stop reason: SDUPTHER

## 2019-01-22 ASSESSMENT — PATIENT HEALTH QUESTIONNAIRE - PHQ9: CLINICAL INTERPRETATION OF PHQ2 SCORE: 0

## 2019-01-22 NOTE — PROGRESS NOTES
CC: Nickie Martin is a 33 y.o. female who presents for follow up of the following     Shift work sleep disorder  Interval course:  No significant change,  - controlled sleep disordered with Ambien 10 mg, needs a refill   - the last refill on 10/12/18, 30 tabs per month     The patient works different shifts, and he has a trouble to go to sleep.  Controlled with ambien, as needed, stable.   Discussed sleep hygiene, to follow as much as possible:   - Go to bed and wake up at consistent times whether work/school day or not.   - Keep room dark, quiet, and comfortable.   - Don't have naps.  - Avoid stimulant or caffeine use more than 4 hours after wake time.   - Avoid meal or exercise 2-3 hours prior to going to bed.     Right cholesteatoma  Currently asymptomatic, stable, f/u by ENT.    Healthcare maintenance  UTD    Current Outpatient Prescriptions   Medication Sig Dispense Refill   • zolpidem (AMBIEN) 10 MG Tab Take 1 Tab by mouth at bedtime as needed for Sleep for up to 90 days. 30 Tab 2   • EPINEPHrine (EPIPEN) 0.3 MG/0.3ML Solution Auto-injector solution for injection Inject by intramuscular route once as needed for anaphylaxis.     • ibuprofen (MOTRIN) 200 MG Tab Take 400 mg by mouth 1 time daily as needed.     • multivitamin (THERAGRAN) Tab Take 1 Tab by mouth every day.       No current facility-administered medications for this visit.      She  has a past medical history of Anesthesia; ASTHMA; Cholesteatoma; and Insomnia.  She  has a past surgical history that includes pr anesth,ear surgery (2000); mammoplasty augmentation (4/18/2012); pr enlarge breast with implant; and breast biopsy (Right, 8/4/2017).  Social History   Substance Use Topics   • Smoking status: Never Smoker   • Smokeless tobacco: Never Used   • Alcohol use 0.6 oz/week     1 Standard drinks or equivalent per week      Comment: 3-4 per week     Social History     Social History Narrative    Lives with .     Children: 1    Work: [employer] RN  "    Family History   Problem Relation Age of Onset   • Allergies Paternal Grandfather         asthma   • Diabetes Paternal Grandmother    • Hypertension Maternal Grandmother    • Cancer Neg Hx    • Heart Disease Neg Hx    • Hyperlipidemia Neg Hx    • Stroke Neg Hx    • Thyroid Neg Hx      Family Status   Relation Status   • PGFa (Not Specified)   • PGMo (Not Specified)   • MGMo (Not Specified)   • Neg Hx (Not Specified)     ROS   Taking supplements to help mood or symptoms: yes  Using alcohol or other substances to help mood or symptoms: no  No polydipsia, polyuria.  No temperature intolerance.  No bowel changes  Denies symptoms of bismark: grandiosity, euphoria, need for little or no sleep, rapid pressured speech, spending sprees, reckless or risky behavior, hypersexual behavior.   No visual or auditory hallucinations.   ENT: as above.    Labs     None.     PHYSICAL EXAM   Blood pressure 102/72, pulse 73, temperature 37.1 °C (98.8 °F), temperature source Temporal, height 1.676 m (5' 5.98\"), weight 70.1 kg (154 lb 8.7 oz), SpO2 95 %, not currently breastfeeding.  General: normal speech pattern and content   Clothing and grooming normal.  Behavior: no psychomotor abnormalities or impulsivity  Eye contact: good  Affect: normal  Though content: normal insight and reasoning, no evidence of psychotic process.   Neck/Thyroid: No adenopathy, no palpable thyroid nodules. Right cholesteatoma.  Lungs: CTAB  Heart: RRR no ectopy  Neuro: Gait normal. Reflexes normal and symmetric. Sensation grossly intact        Abnormal findings: none    ASSESMENT AND PLAN     1. Shift work sleep disorder  Refill:  - zolpidem (AMBIEN) 10 MG Tab; Take 1 Tab by mouth at bedtime as needed for Sleep for up to 90 days.  Dispense: 30 Tab; Refill: 2    Obtained and reviewed patient utilization report from [facility] pharmacy database on 1/22/2019 12:31 PM  prior to writing prescription for controlled substance II, III or IV per [place] bill . " Based on assessment of the report, the prescription is medically necessary.     2. Cholesteatoma of right ear  Continue ENT follow-up  - REFERRAL TO ENT    3. Skin cancer screening  - REFERRAL TO DERMATOLOGY    4.  Health care maintenance  UTD    - Reviewed effects and side effects of medication, the patient verbalized understanding     25 minutes face to face with 15 spent counseling and coordinating care. Reviewed the  pathophysiology, etiology, risks and principles of treatment.    Smoking Counseling: Nonsmoker    Follow up: in 3 months    Please note that this dictation was created using voice recognition software. Despite all efforts, some minor grammar mistakes are possible.

## 2019-01-24 DIAGNOSIS — G47.9 SLEEP DISORDER: ICD-10-CM

## 2019-01-24 RX ORDER — ALPRAZOLAM 0.25 MG/1
0.25 TABLET ORAL NIGHTLY PRN
Qty: 30 TAB | Refills: 0 | Status: SHIPPED
Start: 2019-01-24 | End: 2019-04-29 | Stop reason: SDUPTHER

## 2019-01-24 NOTE — TELEPHONE ENCOUNTER
Was the patient seen in the last year in this department? Yes    Does patient have an active prescription for medications requested? No     Received Request Via: Pharmacy     Pt requested at last appt but it was not done. Please advise.

## 2019-04-29 ENCOUNTER — OFFICE VISIT (OUTPATIENT)
Dept: MEDICAL GROUP | Facility: MEDICAL CENTER | Age: 34
End: 2019-04-29
Payer: COMMERCIAL

## 2019-04-29 VITALS
DIASTOLIC BLOOD PRESSURE: 60 MMHG | OXYGEN SATURATION: 96 % | TEMPERATURE: 98.1 F | SYSTOLIC BLOOD PRESSURE: 94 MMHG | BODY MASS INDEX: 24.16 KG/M2 | HEIGHT: 66 IN | WEIGHT: 150.35 LBS | HEART RATE: 62 BPM

## 2019-04-29 DIAGNOSIS — K59.00 CONSTIPATION, UNSPECIFIED CONSTIPATION TYPE: ICD-10-CM

## 2019-04-29 DIAGNOSIS — G47.26 SHIFT WORK SLEEP DISORDER: ICD-10-CM

## 2019-04-29 DIAGNOSIS — Z00.00 HEALTH CARE MAINTENANCE: ICD-10-CM

## 2019-04-29 PROCEDURE — 99214 OFFICE O/P EST MOD 30 MIN: CPT | Performed by: INTERNAL MEDICINE

## 2019-04-29 RX ORDER — ALPRAZOLAM 0.25 MG/1
0.25 TABLET ORAL NIGHTLY PRN
Qty: 30 TAB | Refills: 1 | Status: SHIPPED
Start: 2019-04-29 | End: 2019-07-29 | Stop reason: SDUPTHER

## 2019-04-29 RX ORDER — LACTULOSE 10 G/15ML
10 SOLUTION ORAL DAILY
Qty: 1 BOTTLE | Refills: 5 | Status: SHIPPED | OUTPATIENT
Start: 2019-04-29 | End: 2019-11-05 | Stop reason: SDUPTHER

## 2019-04-29 RX ORDER — ZOLPIDEM TARTRATE 10 MG/1
10 TABLET ORAL NIGHTLY PRN
Qty: 30 TAB | Refills: 2 | Status: SHIPPED
Start: 2019-04-29 | End: 2019-07-29 | Stop reason: SDUPTHER

## 2019-04-29 NOTE — PROGRESS NOTES
CC: Nickie Martin is a 33 y.o. female who presents for follow up of the following     Shift work sleep disorder/Insomnia  Interval course:  No significant change,  - controlled sleep disordered with Ambien 10 mg, needs a refill              - the last refill on 1/22/2019     The patient works different shifts, and he has a trouble to go to sleep.  Controlled with ambien, as needed, stable.   Discussed sleep hygiene, to follow as much as possible:   - Go to bed and wake up at consistent times whether work/school day or not.   - Keep room dark, quiet, and comfortable.   - Don't have naps.  - Avoid stimulant or caffeine use more than 4 hours after wake time.   - Avoid meal or exercise 2-3 hours prior to going to bed.     Constipation  The patient complains of chronic constipation with straining, hard stools and 1-2 BMs per day.  She tried increased fluids and fiber intake, MiraLAX, senna that did not help.    Current Outpatient Prescriptions   Medication Sig Dispense Refill   • zolpidem (AMBIEN) 10 MG Tab Take 1 Tab by mouth at bedtime as needed for Sleep for up to 90 days. 30 Tab 2   • ALPRAZolam (XANAX) 0.25 MG Tab Take 1 Tab by mouth at bedtime as needed for Sleep for up to 30 days. 30 Tab 1   • lactulose 10 GM/15ML Solution Take 15 mL by mouth every day. 1 Bottle 5   • EPINEPHrine (EPIPEN) 0.3 MG/0.3ML Solution Auto-injector solution for injection Inject by intramuscular route once as needed for anaphylaxis.     • ibuprofen (MOTRIN) 200 MG Tab Take 400 mg by mouth 1 time daily as needed.     • multivitamin (THERAGRAN) Tab Take 1 Tab by mouth every day.       No current facility-administered medications for this visit.      She  has a past medical history of Anesthesia; ASTHMA; Cholesteatoma; and Insomnia.  She  has a past surgical history that includes pr anesth,ear surgery (2000); mammoplasty augmentation (4/18/2012); pr enlarge breast with implant; and breast biopsy (Right, 8/4/2017).  Social History   Substance  "Use Topics   • Smoking status: Never Smoker   • Smokeless tobacco: Never Used   • Alcohol use 0.6 oz/week     1 Standard drinks or equivalent per week      Comment: 3-4 per week     Social History     Social History Narrative    Lives with .     Children: 1    Work: [employer]     Family History   Problem Relation Age of Onset   • Allergies Paternal Grandfather         asthma   • Diabetes Paternal Grandmother    • Hypertension Maternal Grandmother    • Cancer Neg Hx    • Heart Disease Neg Hx    • Hyperlipidemia Neg Hx    • Stroke Neg Hx    • Thyroid Neg Hx      Family Status   Relation Status   • PGFa (Not Specified)   • PGMo (Not Specified)   • MGMo (Not Specified)   • Neg Hx (Not Specified)     ROS   Taking supplements to help mood or symptoms: yes  Using alcohol or other substances to help mood or symptoms: no  No polydipsia, polyuria.  No temperature intolerance.  No bowel changes  Denies symptoms of bismark: grandiosity, euphoria, need for little or no sleep, rapid pressured speech, spending sprees, reckless or risky behavior, hypersexual behavior.   No visual or auditory hallucinations.    Labs     None.     PHYSICAL EXAM   BP (!) 94/60 (BP Location: Left arm, Patient Position: Sitting, BP Cuff Size: Adult)   Pulse 62   Temp 36.7 °C (98.1 °F) (Temporal)   Ht 1.676 m (5' 6\")   Wt 68.2 kg (150 lb 5.7 oz)   SpO2 96%   General: normal speech pattern and content   Clothing and grooming normal.  Behavior: no psychomotor abnormalities or impulsivity  Eye contact: good  Affect: normal  Though content: normal insight and reasoning, no evidence of psychotic process.   Neck/Thyroid: No adenopathy, no palpable thyroid nodules.  Lungs: CTAB  Heart: RRR no ectopy  Neuro: Gait normal. Reflexes normal and symmetric. Sensation grossly intact        Abnormal findings: none    ASSESMENT AND PLAN     1. Shift work sleep disorder  - zolpidem (AMBIEN) 10 MG Tab; Take 1 Tab by mouth at bedtime as needed for Sleep for up to " 90 days.  Dispense: 30 Tab; Refill: 2  - ALPRAZolam (XANAX) 0.25 MG Tab; Take 1 Tab by mouth at bedtime as needed for Sleep for up to 30 days.  Dispense: 30 Tab; Refill: 1    Obtained and reviewed patient utilization report from [facility] pharmacy database on 4/29/2019 9:05 PM  prior to writing prescription for controlled substance II, III or IV per [place] bill . Based on assessment of the report, the prescription is medically necessary.     2. Constipation, unspecified constipation type  Advised to continue current lifestyle, add lactulose  - lactulose 10 GM/15ML Solution; Take 15 mL by mouth every day.  Dispense: 1 Bottle; Refill: 5    - Reviewed effects and side effects of medication, the patient verbalized understanding    3. Health care maintenance  UTD     Smoking Counseling: Nonsmoker    Follow up: in 3 months    Please note that this dictation was created using voice recognition software. Despite all efforts, some minor grammar mistakes are possible.

## 2019-04-30 ENCOUNTER — TELEPHONE (OUTPATIENT)
Dept: MEDICAL GROUP | Facility: MEDICAL CENTER | Age: 34
End: 2019-04-30

## 2019-07-16 ENCOUNTER — PATIENT MESSAGE (OUTPATIENT)
Dept: MEDICAL GROUP | Facility: MEDICAL CENTER | Age: 34
End: 2019-07-16

## 2019-07-16 DIAGNOSIS — H71.91 CHOLESTEATOMA OF RIGHT EAR: ICD-10-CM

## 2019-07-16 NOTE — TELEPHONE ENCOUNTER
From: Nickie Martin  To: Marci Villegas M.D.  Sent: 7/16/2019 11:35 AM PDT  Subject: Non-Urgent Medical Question    Hello,   Can I get a new referral for Dr. Augusta Sewell, my ENT, so I can follow up.     Thanks,   Nickie

## 2019-07-16 NOTE — PATIENT COMMUNICATION
1. Name: Nickie Martin  Call Back Number: [phone]         Patient approves a detailed voicemail message: yes    2. Patient is requesting referral to ENT (Ear, Nose & Throat)    3. Clinical Reason for Request: Cholesteatoma of right ear    4. Specialty & Provider Name/Lab/Imaging Location Preference:Dr. Augusta Sewell      5. Is appointment scheduled for requested order/referral: no    Patient was not informed they may receive a return phone call from our office with any additional questions before processing this request.    Will Martin, Med Ass't

## 2019-07-29 ENCOUNTER — OFFICE VISIT (OUTPATIENT)
Dept: MEDICAL GROUP | Facility: MEDICAL CENTER | Age: 34
End: 2019-07-29
Payer: COMMERCIAL

## 2019-07-29 VITALS
OXYGEN SATURATION: 100 % | BODY MASS INDEX: 24.94 KG/M2 | HEIGHT: 66 IN | SYSTOLIC BLOOD PRESSURE: 90 MMHG | WEIGHT: 155.2 LBS | DIASTOLIC BLOOD PRESSURE: 60 MMHG | HEART RATE: 52 BPM | TEMPERATURE: 97.2 F

## 2019-07-29 DIAGNOSIS — J45.990 EXERCISE-INDUCED ASTHMA: ICD-10-CM

## 2019-07-29 DIAGNOSIS — H71.91 CHOLESTEATOMA OF RIGHT EAR: ICD-10-CM

## 2019-07-29 DIAGNOSIS — Z00.00 HEALTH CARE MAINTENANCE: ICD-10-CM

## 2019-07-29 DIAGNOSIS — K59.00 CONSTIPATION, UNSPECIFIED CONSTIPATION TYPE: ICD-10-CM

## 2019-07-29 DIAGNOSIS — G47.26 SHIFT WORK SLEEP DISORDER: ICD-10-CM

## 2019-07-29 DIAGNOSIS — Z91.030 BEE STING ALLERGY: ICD-10-CM

## 2019-07-29 DIAGNOSIS — Z98.890 H/O LUMPECTOMY: ICD-10-CM

## 2019-07-29 DIAGNOSIS — Z00.00 ANNUAL PHYSICAL EXAM: ICD-10-CM

## 2019-07-29 PROBLEM — Z79.899 CONTROLLED SUBSTANCE AGREEMENT SIGNED: Status: RESOLVED | Noted: 2018-03-05 | Resolved: 2019-07-29

## 2019-07-29 PROCEDURE — 99395 PREV VISIT EST AGE 18-39: CPT | Performed by: INTERNAL MEDICINE

## 2019-07-29 RX ORDER — ZOLPIDEM TARTRATE 10 MG/1
10 TABLET ORAL NIGHTLY PRN
Qty: 30 TAB | Refills: 2 | Status: SHIPPED | OUTPATIENT
Start: 2019-07-29 | End: 2019-07-29 | Stop reason: SDUPTHER

## 2019-07-29 RX ORDER — ALPRAZOLAM 0.25 MG/1
0.25 TABLET ORAL NIGHTLY PRN
Qty: 30 TAB | Refills: 1 | Status: SHIPPED | OUTPATIENT
Start: 2019-07-29 | End: 2019-07-29 | Stop reason: SDUPTHER

## 2019-07-29 RX ORDER — ALPRAZOLAM 0.25 MG/1
0.25 TABLET ORAL NIGHTLY PRN
Qty: 30 TAB | Refills: 1 | Status: SHIPPED
Start: 2019-07-29 | End: 2019-12-26 | Stop reason: SDUPTHER

## 2019-07-29 RX ORDER — ZOLPIDEM TARTRATE 10 MG/1
10 TABLET ORAL NIGHTLY PRN
Qty: 30 TAB | Refills: 2 | Status: SHIPPED
Start: 2019-07-29 | End: 2019-11-05 | Stop reason: SDUPTHER

## 2019-07-29 RX ORDER — EPINEPHRINE 0.3 MG/.3ML
0.3 INJECTION SUBCUTANEOUS ONCE
Qty: 0.3 ML | Refills: 2 | Status: SHIPPED | OUTPATIENT
Start: 2019-07-29 | End: 2019-07-29

## 2019-07-29 NOTE — PROGRESS NOTES
CHIEF COMPLAINT  Annual, xanax refill    HPI  Nickie Martin is a 33 y.o. female who presents today for the following     Recommendations:  Regular exercise at least 4 days a week  Diet: advised balanced diet  Dental exam at least 1-2 times per year  Sunscreen use: advised     Immunizations:  TdaP: 2 yrs ago, will send report     GYN   Last PAP: 11/2016, normal   Abnormal PAP: no     Menarche:   Menses every month with bleeding for 5 days  No excess cramping or bleeding.   Takes OTC analgesics for cramping  Contraception: Mirena  Hx STD: no    Shift work sleep disorder/Insomnia  Interval course:  No significant change,  - Controlled with Xanax  0.25 mg prn - not daily, and ambien 10 mg prn - needs a refill  - the last refill on 4/29/2019     The patient works different shifts, and he has a trouble to go to sleep.  Controlled with ambien, as needed, stable.   Discussed sleep hygiene, to follow as much as possible:   - Go to bed and wake up at consistent times whether work/school day or not.   - Keep room dark, quiet, and comfortable.   - Don't have naps.  - Avoid stimulant or caffeine use more than 4 hours after wake time.   - Avoid meal or exercise 2-3 hours prior to going to bed.     Exercise induced asthma  Stable, used albuterol as needed before exercise.     Right cholesteatoma  Currently asymptomatic, stable, f/u by ENT.    Cholesteatoma, RT  Stable, followed up by ENT.     Bee sting allergy  She has severe allergy reaction to bee sting, has EpiPen at home.    H/o lumpectomy, RT  Benign fibroadenoma.     Constipation  The patient hadchronic constipation with straining, hard stools and 1-2 BMs per day.  Controlled with lactulose.    Reviewed PMH, PSH, FH, SH, ALL, HCM/IMM, no changes  Reviewed MEDS, no changes    Patient Active Problem List    Diagnosis Date Noted   • H/O lumpectomy 10/12/2018   • Bee sting allergy 11/09/2016   • Cholesteatoma of right ear 04/11/2016   • Shift work sleep disorder 10/22/2015   •  Exercise-induced asthma 10/22/2015   • Health care maintenance 10/22/2015     CURRENT MEDICATIONS  Current Outpatient Prescriptions   Medication Sig Dispense Refill   • ALPRAZolam (XANAX) 0.25 MG Tab Take 1 Tab by mouth at bedtime as needed for Sleep for up to 30 days. 30 Tab 1   • lactulose 10 GM/15ML Solution Take 15 mL by mouth every day. 1 Bottle 5   • EPINEPHrine (EPIPEN) 0.3 MG/0.3ML Solution Auto-injector solution for injection Inject by intramuscular route once as needed for anaphylaxis.     • ibuprofen (MOTRIN) 200 MG Tab Take 400 mg by mouth 1 time daily as needed.     • multivitamin (THERAGRAN) Tab Take 1 Tab by mouth every day.       No current facility-administered medications for this visit.      ALLERGIES  Allergies: Bee venom  PAST MEDICAL HISTORY  Past Medical History:   Diagnosis Date   • Anesthesia     PONV   • ASTHMA     exercise induced   • Cholesteatoma     right   • Insomnia      SURGICAL HISTORY  She  has a past surgical history that includes pr anesth,ear surgery (2000); mammoplasty augmentation (4/18/2012); pr enlarge breast with implant; and breast biopsy (Right, 8/4/2017).  SOCIAL HISTORY  Social History   Substance Use Topics   • Smoking status: Never Smoker   • Smokeless tobacco: Never Used   • Alcohol use 0.6 oz/week     1 Standard drinks or equivalent per week      Comment: 3-4 per week     Social History     Social History Narrative    Lives with .     Children: 1    Work: [employer]     FAMILY HISTORY  Family History   Problem Relation Age of Onset   • Allergies Paternal Grandfather         asthma   • Diabetes Paternal Grandmother    • Hypertension Maternal Grandmother    • Cancer Neg Hx    • Heart Disease Neg Hx    • Hyperlipidemia Neg Hx    • Stroke Neg Hx    • Thyroid Neg Hx      Family Status   Relation Status   • PGFa (Not Specified)   • PGMo (Not Specified)   • MGMo (Not Specified)   • Neg Hx (Not Specified)       ROS   Constitutional: Negative for fever, chills,  "fatigue.  HENT: Negative for congestion, sore throat.  Eyes: Negative for vision problems.   Respiratory: Negative for cough, shortness of breath.  Cardiovascular: Negative for chest pain, palpitations.   Gastrointestinal: Negative for heartburn, nausea, abdominal pain.   Genitourinary: Negative for dysuria.  Musculoskeletal: Negative for significant myalgia, back and joint pain.   Skin: Negative for rash.   Neuro: Negative for dizziness, weakness and headaches.   Endo/Heme/Allergies: Does not bruise/bleed easily.   Psychiatric/Behavioral: Negative for depression. A.nd    PHYSICAL EXAM   BP (!) 90/60   Pulse (!) 52   Temp 36.2 °C (97.2 °F) (Temporal)   Ht 1.676 m (5' 6\")   Wt 70.4 kg (155 lb 3.3 oz)   SpO2 100%   BMI 25.05 kg/m²   General:  NAD, well appearing  HEENT:   NC/AT, PERRLA, EOMI, TMs are clear. Oropharyngeal mucosa is pink,  without lesions;  no cervical / supraclavicular  lymphadenopathy, no thyromegaly.    Cardiovascular: RRR.   No m/r/g.       Lungs:   CTAB, no w/r/r, no respiratory distress.  Abdomen: Soft, NT/ND; no hepatosplenomegaly.  Extremities:  2+ DP and radial pulses bilaterally.  No c/c/e.   Skin:  Warm, dry.  No erythema. No rash.   Neurologic: Alert & oriented x 3. CN II-XII grossly intact. No focal deficits.  Psychiatric:  Affect normal, mood normal, judgment normal.    Labs     Labs are reviewed and discussed with a patient  Lab Results   Component Value Date/Time    CHOLSTRLTOT 169 06/20/2017 07:22 AM    LDL 93 06/20/2017 07:22 AM    HDL 60 06/20/2017 07:22 AM    TRIGLYCERIDE 78 06/20/2017 07:22 AM       Lab Results   Component Value Date/Time    GLUCOSE 100 (H) 06/20/2017 07:22 AM     Lab Results   Component Value Date/Time    ASTSGOT 17 08/22/2014 01:17 PM     Lab Results   Component Value Date/Time    WBC 4.9 04/16/2012 09:57 AM    RBC 4.70 04/16/2012 09:57 AM    HEMOGLOBIN 14.1 04/16/2012 09:57 AM    HEMATOCRIT 41.9 04/16/2012 09:57 AM    MCV 89.2 04/16/2012 09:57 AM    [facility] " 29.9 04/16/2012 09:57 AM    MCHC 33.6 04/16/2012 09:57 AM    MPV 6.6 (L) 04/16/2012 09:57 AM    NEUTSPOLYS 46.9 04/16/2012 09:57 AM    LYMPHOCYTES 40.2 04/16/2012 09:57 AM    MONOCYTES 6.0 04/16/2012 09:57 AM    EOSINOPHILS 4.9 04/16/2012 09:57 AM    BASOPHILS 2.0 04/16/2012 09:57 AM      Imaging     None    Assessment and Plan     Nickie Martin is a 33 y.o. female    1. Annual physical exam  Reviewed PMH, PSH, FH, SH, ALL, MEDS, HCM/IMM.   Advised healthy habits, diet, exercise.    2. Health care maintenance  Per HPI    3. Shift work sleep disorder  Refills:  - ALPRAZolam (XANAX) 0.25 MG Tab; Take 1 Tab by mouth at bedtime as needed for Sleep for up to 30 days.  Dispense: 30 Tab; Refill: 1  - zolpidem (AMBIEN) 10 MG Tab; Take 1 Tab by mouth at bedtime as needed for Sleep for up to 90 days.  Dispense: 30 Tab; Refill: 2    4. Exercise-induced asthma  Controlled, continue albuterol as needed    5. Cholesteatoma of right ear  Stable, continue ENT follow-up    6. Bee sting allergy  Given refill  - EPINEPHrine (EPIPEN) 0.3 MG/0.3ML Solution Auto-injector solution for injection; 0.3 mL by Intramuscular route Once for 1 dose.  Dispense: 0.3 mL; Refill: 2    7. H/O lumpectomy  Fibroadenoma, benign    8. Constipation, unspecified constipation type  Controlled with lactulose.    Counseling:   - Smoking:  Nonsmoker    Followup: Return in about 3 months (around 10/29/2019), or if symptoms worsen or fail to improve.    All questions are answered.    Please note that this dictation was created using voice recognition software, and that there might be errors of roland and possibly content.

## 2019-09-03 DIAGNOSIS — K59.00 CONSTIPATION, UNSPECIFIED CONSTIPATION TYPE: ICD-10-CM

## 2019-10-21 ENCOUNTER — APPOINTMENT (OUTPATIENT)
Dept: MEDICAL GROUP | Facility: MEDICAL CENTER | Age: 34
End: 2019-10-21
Payer: COMMERCIAL

## 2019-10-22 ENCOUNTER — APPOINTMENT (OUTPATIENT)
Dept: MEDICAL GROUP | Facility: MEDICAL CENTER | Age: 34
End: 2019-10-22
Payer: COMMERCIAL

## 2019-11-05 ENCOUNTER — OFFICE VISIT (OUTPATIENT)
Dept: MEDICAL GROUP | Facility: MEDICAL CENTER | Age: 34
End: 2019-11-05
Payer: COMMERCIAL

## 2019-11-05 VITALS
WEIGHT: 151 LBS | OXYGEN SATURATION: 97 % | BODY MASS INDEX: 24.27 KG/M2 | RESPIRATION RATE: 16 BRPM | HEART RATE: 62 BPM | SYSTOLIC BLOOD PRESSURE: 102 MMHG | HEIGHT: 66 IN | DIASTOLIC BLOOD PRESSURE: 60 MMHG | TEMPERATURE: 97.8 F

## 2019-11-05 DIAGNOSIS — G47.26 SHIFT WORK SLEEP DISORDER: ICD-10-CM

## 2019-11-05 DIAGNOSIS — Z00.00 HEALTH CARE MAINTENANCE: ICD-10-CM

## 2019-11-05 DIAGNOSIS — K59.00 CONSTIPATION, UNSPECIFIED CONSTIPATION TYPE: ICD-10-CM

## 2019-11-05 PROCEDURE — 99214 OFFICE O/P EST MOD 30 MIN: CPT | Performed by: INTERNAL MEDICINE

## 2019-11-05 RX ORDER — LACTULOSE 10 G/15ML
10 SOLUTION ORAL DAILY
Qty: 1 BOTTLE | Refills: 5 | Status: SHIPPED
Start: 2019-11-05 | End: 2020-06-23

## 2019-11-05 RX ORDER — ZOLPIDEM TARTRATE 10 MG/1
10 TABLET ORAL NIGHTLY PRN
Qty: 30 TAB | Refills: 2 | Status: SHIPPED
Start: 2019-11-05 | End: 2019-12-26 | Stop reason: SDUPTHER

## 2019-11-05 NOTE — PROGRESS NOTES
CC: Nickie Martin is a 33 y.o. female who presents for follow up of the following     Shift work sleep disorder/Insomnia  Interval course:  No significant change,  - Controlled with Xanax 0.25 mg prn - not daily, and ambien 10 mg prn - needs a refill  - the last refill on 7/29/2019     The patient works different shifts, and he has a trouble to go to sleep.  Controlled with ambien, as needed, stable.   Discussed sleep hygiene, to follow as much as possible:   - Go to bed and wake up at consistent times whether work/school day or not.   - Keep room dark, quiet, and comfortable.   - Don't have naps.  - Avoid stimulant or caffeine use more than 4 hours after wake time.   - Avoid meal or exercise 2-3 hours prior to going to bed.      Constipation  The patient hadchronic constipation with straining, hard stools and 1-2 BMs per day.  Controlled with lactulose, requested refill.    Current Outpatient Medications   Medication Sig Dispense Refill   • lactulose 10 GM/15ML Solution Take 15 mL by mouth every day. 1 Bottle 5   • zolpidem (AMBIEN) 10 MG Tab Take 1 Tab by mouth at bedtime as needed for Sleep for up to 90 days. 30 Tab 2   • EPINEPHrine (EPIPEN) 0.3 MG/0.3ML Solution Auto-injector solution for injection Inject by intramuscular route once as needed for anaphylaxis.     • multivitamin (THERAGRAN) Tab Take 1 Tab by mouth every day.     • ibuprofen (MOTRIN) 200 MG Tab Take 400 mg by mouth 1 time daily as needed.       No current facility-administered medications for this visit.      She  has a past medical history of Anesthesia, ASTHMA, Cholesteatoma, and Insomnia.  She  has a past surgical history that includes pr anesth,ear surgery (2000); mammoplasty augmentation (4/18/2012); pr enlarge breast with implant; and breast biopsy (Right, 8/4/2017).  Social History     Tobacco Use   • Smoking status: Never Smoker   • Smokeless tobacco: Never Used   Substance Use Topics   • Alcohol use: Yes     Alcohol/week: 0.6 oz      "Types: 1 Standard drinks or equivalent per week     Comment: 3-4 per week   • Drug use: No     Social History     Patient does not qualify to have social determinant information on file (likely too young).   Social History Narrative    Lives with .     Children: 1    Work: [employer]     Family History   Problem Relation Age of Onset   • Allergies Paternal Grandfather         asthma   • Diabetes Paternal Grandmother    • Hypertension Maternal Grandmother    • Cancer Neg Hx    • Heart Disease Neg Hx    • Hyperlipidemia Neg Hx    • Stroke Neg Hx    • Thyroid Neg Hx      Family Status   Relation Name Status   • PGFa  (Not Specified)   • PGMo  (Not Specified)   • MGMo  (Not Specified)   • Neg Hx  (Not Specified)       ROS   Taking supplements to help mood or symptoms: yes  Using alcohol or other substances to help mood or symptoms: no  No polydipsia, polyuria.  No temperature intolerance.  No bowel changes  Denies symptoms of bismark: grandiosity, euphoria, need for little or no sleep, rapid pressured speech, spending sprees, reckless or risky behavior, hypersexual behavior.   No visual or auditory hallucinations.    Labs     None.     PHYSICAL EXAM   Blood Pressure 102/60 (BP Location: Left arm, Patient Position: Sitting, BP Cuff Size: Adult)   Pulse 62   Temperature 36.6 °C (97.8 °F) (Temporal)   Respiration 16   Height 1.676 m (5' 6\")   Weight 68.5 kg (151 lb)   Oxygen Saturation 97%   General: normal speech pattern and content   Clothing and grooming normal.  Behavior: no psychomotor abnormalities or impulsivity  Eye contact: good  Affect: normal  Though content: normal insight and reasoning, no evidence of psychotic process.   Neck/Thyroid: No adenopathy, no palpable thyroid nodules.  Lungs: CTAB  Heart: RRR no ectopy  Neuro: Gait normal. Reflexes normal and symmetric. Sensation grossly intact        Abnormal findings: none    Labs     Labs are reviewed and discussed with a patient  Lab Results "   Component Value Date/Time    CHOLSTRLTOT 169 06/20/2017 07:22 AM    LDL 93 06/20/2017 07:22 AM    HDL 60 06/20/2017 07:22 AM    TRIGLYCERIDE 78 06/20/2017 07:22 AM       Lab Results   Component Value Date/Time    GLUCOSE 100 (H) 06/20/2017 07:22 AM     Lab Results   Component Value Date/Time    ASTSGOT 17 08/22/2014 01:17 PM      Lab Results   Component Value Date/Time    WBC 4.9 04/16/2012 09:57 AM    RBC 4.70 04/16/2012 09:57 AM    HEMOGLOBIN 14.1 04/16/2012 09:57 AM    HEMATOCRIT 41.9 04/16/2012 09:57 AM    MCV 89.2 04/16/2012 09:57 AM    MCH 29.9 04/16/2012 09:57 AM    MCHC 33.6 04/16/2012 09:57 AM    MPV 6.6 (L) 04/16/2012 09:57 AM    NEUTSPOLYS 46.9 04/16/2012 09:57 AM    LYMPHOCYTES 40.2 04/16/2012 09:57 AM    MONOCYTES 6.0 04/16/2012 09:57 AM    EOSINOPHILS 4.9 04/16/2012 09:57 AM    BASOPHILS 2.0 04/16/2012 09:57 AM      Imaging     None    ASSESMENT AND PLAN     1. Shift work sleep disorder  Controlled, continue current treatment  - zolpidem (AMBIEN) 10 MG Tab; Take 1 Tab by mouth at bedtime as needed for Sleep for up to 90 days.  Dispense: 30 Tab; Refill: 2    Obtained and reviewed patient utilization report from [facility] pharmacy database on 11/5/2019 9:53 AM  prior to writing prescription for controlled substance II, III or IV per [place] bill . Based on assessment of the report, the prescription is medically necessary.     2. Constipation, unspecified constipation type  Controlled, continue current treatment including good fluid and fiber intake  - lactulose 10 GM/15ML Solution; Take 15 mL by mouth every day.  Dispense: 1 Bottle; Refill: 5    3. Health care maintenance  Pending records for Pap smear done in 2018    Smoking Counseling: Nonsmoker    Follow up: in 3 months    Please note that this dictation was created using voice recognition software. Despite all efforts, some minor grammar mistakes are possible.

## 2019-12-26 ENCOUNTER — OFFICE VISIT (OUTPATIENT)
Dept: MEDICAL GROUP | Facility: MEDICAL CENTER | Age: 34
End: 2019-12-26
Payer: COMMERCIAL

## 2019-12-26 VITALS
HEART RATE: 65 BPM | WEIGHT: 149.91 LBS | TEMPERATURE: 98.6 F | SYSTOLIC BLOOD PRESSURE: 98 MMHG | OXYGEN SATURATION: 100 % | HEIGHT: 66 IN | RESPIRATION RATE: 14 BRPM | DIASTOLIC BLOOD PRESSURE: 62 MMHG | BODY MASS INDEX: 24.09 KG/M2

## 2019-12-26 DIAGNOSIS — Z00.00 HEALTH CARE MAINTENANCE: ICD-10-CM

## 2019-12-26 DIAGNOSIS — G47.26 SHIFT WORK SLEEP DISORDER: ICD-10-CM

## 2019-12-26 PROCEDURE — 99214 OFFICE O/P EST MOD 30 MIN: CPT | Performed by: INTERNAL MEDICINE

## 2019-12-26 RX ORDER — ZOLPIDEM TARTRATE 10 MG/1
10 TABLET ORAL NIGHTLY PRN
Qty: 30 TAB | Refills: 2 | Status: SHIPPED | OUTPATIENT
Start: 2019-12-26 | End: 2020-03-26 | Stop reason: SDUPTHER

## 2019-12-26 RX ORDER — ALPRAZOLAM 0.25 MG/1
0.25 TABLET ORAL NIGHTLY PRN
Qty: 30 TAB | Refills: 1 | Status: SHIPPED | OUTPATIENT
Start: 2019-12-26 | End: 2020-03-26 | Stop reason: SDUPTHER

## 2019-12-26 ASSESSMENT — PATIENT HEALTH QUESTIONNAIRE - PHQ9: CLINICAL INTERPRETATION OF PHQ2 SCORE: 0

## 2019-12-26 NOTE — PROGRESS NOTES
CC: Nickie Martin is a 34 y.o. female who presents for follow up of the following     Shift work sleep disorder/Insomnia  Interval course:  No significant change,  - controlled sleep disordered with Ambien 10 mg, and xanax ~ 2 days a week   - needs refill  - the last refill on 11/5/19     The patient works different shifts, and he has a trouble to go to sleep.  Controlled with ambien, as needed, stable.   Discussed sleep hygiene, to follow as much as possible:   - Go to bed and wake up at consistent times whether work/school day or not.   - Keep room dark, quiet, and comfortable.   - Don't have naps.  - Avoid stimulant or caffeine use more than 4 hours after wake time.   - Avoid meal or exercise 2-3 hours prior to going to bed.    Current Outpatient Medications   Medication Sig Dispense Refill   • zolpidem (AMBIEN) 10 MG Tab Take 1 Tab by mouth at bedtime as needed for Sleep for up to 90 days. 30 Tab 2   • ALPRAZolam (XANAX) 0.25 MG Tab Take 1 Tab by mouth at bedtime as needed for Sleep for up to 30 days. 30 Tab 1   • lactulose 10 GM/15ML Solution Take 15 mL by mouth every day. 1 Bottle 5   • EPINEPHrine (EPIPEN) 0.3 MG/0.3ML Solution Auto-injector solution for injection Inject by intramuscular route once as needed for anaphylaxis.     • ibuprofen (MOTRIN) 200 MG Tab Take 400 mg by mouth 1 time daily as needed.     • multivitamin (THERAGRAN) Tab Take 1 Tab by mouth every day.       No current facility-administered medications for this visit.      She  has a past medical history of Anesthesia, ASTHMA, Cholesteatoma, and Insomnia.  She  has a past surgical history that includes pr anesth,ear surgery (2000); mammoplasty augmentation (4/18/2012); pr enlarge breast with implant; and breast biopsy (Right, 8/4/2017).  Social History     Tobacco Use   • Smoking status: Never Smoker   • Smokeless tobacco: Never Used   Substance Use Topics   • Alcohol use: Yes     Alcohol/week: 0.6 oz     Types: 1 Standard drinks or  "equivalent per week     Comment: 3-4 per week   • Drug use: No     Social History     Patient does not qualify to have social determinant information on file (likely too young).   Social History Narrative    Lives with .     Children: 1    Work: [employer] RN     Family History   Problem Relation Age of Onset   • Allergies Paternal Grandfather         asthma   • Diabetes Paternal Grandmother    • Hypertension Maternal Grandmother    • Cancer Neg Hx    • Heart Disease Neg Hx    • Hyperlipidemia Neg Hx    • Stroke Neg Hx    • Thyroid Neg Hx      Family Status   Relation Name Status   • PGFa  (Not Specified)   • PGMo  (Not Specified)   • MGMo  (Not Specified)   • Neg Hx  (Not Specified)       ROS   Taking supplements to help mood or symptoms: yes  Using alcohol or other substances to help mood or symptoms: no  No polydipsia, polyuria.  No temperature intolerance.  No bowel changes  Denies symptoms of bismark: grandiosity, euphoria, need for little or no sleep, rapid pressured speech, spending sprees, reckless or risky behavior, hypersexual behavior.   No visual or auditory hallucinations.    Labs     None.     PHYSICAL EXAM   Blood Pressure (Abnormal) 98/62   Pulse 65   Temperature 37 °C (98.6 °F)   Respiration 14   Height 1.676 m (5' 6\")   Weight 68 kg (149 lb 14.6 oz)   Oxygen Saturation 100%   General: normal speech pattern and content   Clothing and grooming normal.  Behavior: no psychomotor abnormalities or impulsivity  Eye contact: good  Affect: normal  Though content: normal insight and reasoning, no evidence of psychotic process.   Neck/Thyroid: No adenopathy, no palpable thyroid nodules.  Lungs: CTAB  Heart: RRR no ectopy  Neuro: Gait normal. Reflexes normal and symmetric. Sensation grossly intact          Labs     Labs are reviewed and discussed with a patient  Lab Results   Component Value Date/Time    CHOLSTRLTOT 169 06/20/2017 07:22 AM    LDL 93 06/20/2017 07:22 AM    HDL 60 06/20/2017 07:22 AM    " TRIGLYCERIDE 78 06/20/2017 07:22 AM       Lab Results   Component Value Date/Time    GLUCOSE 100 (H) 06/20/2017 07:22 AM     Lab Results   Component Value Date/Time    ASTSGOT 17 08/22/2014 01:17 PM      Lab Results   Component Value Date/Time    WBC 4.9 04/16/2012 09:57 AM    RBC 4.70 04/16/2012 09:57 AM    HEMOGLOBIN 14.1 04/16/2012 09:57 AM    HEMATOCRIT 41.9 04/16/2012 09:57 AM    MCV 89.2 04/16/2012 09:57 AM    MCH 29.9 04/16/2012 09:57 AM    MCHC 33.6 04/16/2012 09:57 AM    MPV 6.6 (L) 04/16/2012 09:57 AM    NEUTSPOLYS 46.9 04/16/2012 09:57 AM    LYMPHOCYTES 40.2 04/16/2012 09:57 AM    MONOCYTES 6.0 04/16/2012 09:57 AM    EOSINOPHILS 4.9 04/16/2012 09:57 AM    BASOPHILS 2.0 04/16/2012 09:57 AM      Imaging     None    ASSESMENT AND PLAN     1. Shift work sleep disorder  Controlled, continue current treatment  - zolpidem (AMBIEN) 10 MG Tab; Take 1 Tab by mouth at bedtime as needed for Sleep for up to 90 days.  Dispense: 30 Tab; Refill: 2  - ALPRAZolam (XANAX) 0.25 MG Tab; Take 1 Tab by mouth at bedtime as needed for Sleep for up to 30 days.  Dispense: 30 Tab; Refill: 1    - Reviewed effects and side effects of medication, the patient verbalized understanding    Obtained and reviewed patient utilization report from [facility] database on 12/26/2019 1:15 PM  prior to writing prescription for controlled substance II, III or IV per [place] bill . Based on assessment of the report, the prescription is medically necessary.     2. Health care maintenance  Pending records for Pap smear done in 2019    Smoking Counseling: Nonsmoker    Follow up: in 3 months and prn    Please note that this dictation was created using voice recognition software. Despite all efforts, some minor grammar mistakes are possible.

## 2020-03-26 ENCOUNTER — OFFICE VISIT (OUTPATIENT)
Dept: MEDICAL GROUP | Age: 35
End: 2020-03-26
Payer: COMMERCIAL

## 2020-03-26 VITALS
WEIGHT: 142.8 LBS | RESPIRATION RATE: 14 BRPM | HEIGHT: 66 IN | DIASTOLIC BLOOD PRESSURE: 68 MMHG | TEMPERATURE: 97.3 F | OXYGEN SATURATION: 100 % | SYSTOLIC BLOOD PRESSURE: 110 MMHG | HEART RATE: 70 BPM | BODY MASS INDEX: 22.95 KG/M2

## 2020-03-26 DIAGNOSIS — G47.26 SHIFT WORK SLEEP DISORDER: ICD-10-CM

## 2020-03-26 DIAGNOSIS — F43.21 SITUATIONAL DEPRESSION: ICD-10-CM

## 2020-03-26 DIAGNOSIS — Z00.00 HEALTH CARE MAINTENANCE: ICD-10-CM

## 2020-03-26 DIAGNOSIS — F43.9 STRESS: ICD-10-CM

## 2020-03-26 DIAGNOSIS — F41.8 SITUATIONAL ANXIETY: ICD-10-CM

## 2020-03-26 PROCEDURE — 99214 OFFICE O/P EST MOD 30 MIN: CPT | Performed by: INTERNAL MEDICINE

## 2020-03-26 RX ORDER — ZOLPIDEM TARTRATE 10 MG/1
10 TABLET ORAL NIGHTLY PRN
Qty: 30 TAB | Refills: 2 | Status: SHIPPED | OUTPATIENT
Start: 2020-03-26 | End: 2020-06-23 | Stop reason: SDUPTHER

## 2020-03-26 RX ORDER — ZOLPIDEM TARTRATE 10 MG/1
10 TABLET ORAL NIGHTLY PRN
Qty: 30 TAB | Refills: 2 | Status: SHIPPED
Start: 2020-03-26 | End: 2020-03-26 | Stop reason: SDUPTHER

## 2020-03-26 RX ORDER — FLUOXETINE 10 MG/1
CAPSULE ORAL
Qty: 180 CAP | Refills: 0 | Status: SHIPPED | OUTPATIENT
Start: 2020-03-26 | End: 2020-06-23 | Stop reason: SDUPTHER

## 2020-03-26 RX ORDER — ALPRAZOLAM 0.25 MG/1
0.25 TABLET ORAL NIGHTLY PRN
Qty: 30 TAB | Refills: 1 | Status: SHIPPED | OUTPATIENT
Start: 2020-03-26 | End: 2020-03-26 | Stop reason: SDUPTHER

## 2020-03-26 RX ORDER — ALPRAZOLAM 0.5 MG/1
0.5 TABLET ORAL 3 TIMES DAILY PRN
Qty: 60 TAB | Refills: 1 | Status: SHIPPED | OUTPATIENT
Start: 2020-03-26 | End: 2020-04-25

## 2020-03-26 RX ORDER — ALPRAZOLAM 0.25 MG/1
0.25 TABLET ORAL NIGHTLY PRN
Qty: 30 TAB | Refills: 1 | Status: SHIPPED
Start: 2020-03-26 | End: 2020-03-26 | Stop reason: SDUPTHER

## 2020-03-26 SDOH — HEALTH STABILITY: MENTAL HEALTH: HOW MANY STANDARD DRINKS CONTAINING ALCOHOL DO YOU HAVE ON A TYPICAL DAY?: 1 OR 2

## 2020-03-26 SDOH — HEALTH STABILITY: MENTAL HEALTH: HOW OFTEN DO YOU HAVE 6 OR MORE DRINKS ON ONE OCCASION?: NEVER

## 2020-03-26 SDOH — HEALTH STABILITY: MENTAL HEALTH: HOW OFTEN DO YOU HAVE A DRINK CONTAINING ALCOHOL?: 4 OR MORE TIMES A WEEK

## 2020-03-26 ASSESSMENT — ANXIETY QUESTIONNAIRES
5. BEING SO RESTLESS THAT IT IS HARD TO SIT STILL: SEVERAL DAYS
4. TROUBLE RELAXING: NEARLY EVERY DAY
GAD7 TOTAL SCORE: 18
7. FEELING AFRAID AS IF SOMETHING AWFUL MIGHT HAPPEN: NEARLY EVERY DAY
6. BECOMING EASILY ANNOYED OR IRRITABLE: MORE THAN HALF THE DAYS
2. NOT BEING ABLE TO STOP OR CONTROL WORRYING: NEARLY EVERY DAY
3. WORRYING TOO MUCH ABOUT DIFFERENT THINGS: NEARLY EVERY DAY
1. FEELING NERVOUS, ANXIOUS, OR ON EDGE: NEARLY EVERY DAY

## 2020-03-26 ASSESSMENT — PATIENT HEALTH QUESTIONNAIRE - PHQ9
SUM OF ALL RESPONSES TO PHQ QUESTIONS 1-9: 19
5. POOR APPETITE OR OVEREATING: 3 - NEARLY EVERY DAY
CLINICAL INTERPRETATION OF PHQ2 SCORE: 6

## 2020-03-26 ASSESSMENT — PAIN SCALES - GENERAL: PAINLEVEL: NO PAIN

## 2020-03-26 NOTE — PROGRESS NOTES
CC: Nickie Martin is a 34 y.o. female who presents for the following     Anxiety, depression  Onset:  adolescense  Course: worse in the last few weeks    - longterm boyfriend left   - epidemics Covid   - pending final exams at school  Mood/anxiety currently does not affect: daily activities/sleep.  Previous treatment:   xanax  Current treatment: start fluoxetine 10 mg then 20 mg per day, increase xanax in the next few days/weks     Risk factors:   · Depression, anxiety  · H/o phobia: no  · H/o panic attacks: no  · H/o hypomanic or manic episode: no  · Substance abuse  (alcohol,  prescription drugs caffeine, tobacco): no  · Family support: yes  · Living alone:  no  · Family history of psych disorders: yes  · Stress: no  · PMH of abuse (sexual, physical, emotional abuse; neglect): no   TOM-7 Questionnaire  Feeling nervous, anxious, or on edge: Nearly every day  Not being able to sop or control worrying: Nearly every day  Worrying too much about different things: Nearly every day  Trouble relaxing: Nearly every day  Being so restless that it's hard to sit still: Several days  Becoming easily annoyed or irritable: More than half the days  Feeling afraid as if something awful might happen: Nearly every day  Total: 18    Depression Screening    Little interest or pleasure in doing things?  3 - nearly every day   Feeling down, depressed , or hopeless? 3 - nearly every day   Trouble falling or staying asleep, or sleeping too much?  3 - nearly every day   Feeling tired or having little energy?  1 - several days   Poor appetite or overeating?  3 - nearly every day   Feeling bad about yourself - or that you are a failure or have let yourself or your family down? 3 - nearly every day   Trouble concentrating on things, such as reading the newspaper or watching television? 3 - nearly every day   Moving or speaking so slowly that other people could have noticed.  Or the opposite - being so fidgety or restless that you have been  moving around a lot more than usual?  0 - not at all   Thoughts that you would be better off dead, or of hurting yourself?  0 - not at all   Patient Health Questionnaire Score: 19     Suicidal ideation: no    Current Outpatient Medications   Medication Sig Dispense Refill   • ALPRAZolam (XANAX) 0.25 MG Tab Take 1 Tab by mouth at bedtime as needed for Sleep for up to 30 days. 30 Tab 1   • zolpidem (AMBIEN) 10 MG Tab Take 1 Tab by mouth at bedtime as needed for Sleep for up to 90 days. 30 Tab 2   • EPINEPHrine (EPIPEN) 0.3 MG/0.3ML Solution Auto-injector solution for injection Inject by intramuscular route once as needed for anaphylaxis.     • lactulose 10 GM/15ML Solution Take 15 mL by mouth every day. (Patient not taking: Reported on 3/26/2020) 1 Bottle 5   • ibuprofen (MOTRIN) 200 MG Tab Take 400 mg by mouth 1 time daily as needed.     • multivitamin (THERAGRAN) Tab Take 1 Tab by mouth every day.       No current facility-administered medications for this visit.      She  has a past medical history of Anesthesia, ASTHMA, Cholesteatoma, and Insomnia.  She  has a past surgical history that includes pr anesth,ear surgery (2000); mammoplasty augmentation (4/18/2012); pr enlarge breast with implant; and breast biopsy (Right, 8/4/2017).  Social History     Tobacco Use   • Smoking status: Never Smoker   • Smokeless tobacco: Never Used   Substance Use Topics   • Alcohol use: Yes     Alcohol/week: 0.6 oz     Types: 1 Standard drinks or equivalent per week     Frequency: 4 or more times a week     Drinks per session: 1 or 2     Binge frequency: Never     Comment: 3-4 per week   • Drug use: Yes     Types: Marijuana     Comment: rare     Social History     Social History Narrative    Lives with .     Children: 1    Work: [employer]     Family History   Problem Relation Age of Onset   • Allergies Paternal Grandfather         asthma   • Diabetes Paternal Grandmother    • Hypertension Maternal Grandmother    • Cancer Neg Hx  "   • Heart Disease Neg Hx    • Hyperlipidemia Neg Hx    • Stroke Neg Hx    • Thyroid Neg Hx      Family Status   Relation Name Status   • PGFa  (Not Specified)   • PGMo  (Not Specified)   • MGMo  (Not Specified)   • Neg Hx  (Not Specified)     ROS   Taking supplements to help mood or symptoms: yes  Using alcohol or other substances to help mood or symptoms: no  No polydipsia, polyuria.  No temperature intolerance.  No bowel changes  Denies symptoms of bismark: grandiosity, euphoria, need for little or no sleep, rapid pressured speech, spending sprees, reckless or risky behavior, hypersexual behavior.   No visual or auditory hallucinations.    PHYSICAL EXAM   Blood Pressure 110/68   Pulse 70   Temperature 36.3 °C (97.3 °F)   Respiration 14   Height 1.676 m (5' 6\")   Weight 64.8 kg (142 lb 12.8 oz)   Oxygen Saturation 100%   General: tearful  Clothing and grooming normal.  Behavior: no psychomotor abnormalities or impulsivity  Eye contact: good   Affect: normal  Though content: normal insight and reasoning, no evidence of psychotic process.   Neck/Thyroid: No adenopathy, no palpable thyroid nodules.  Lungs: CTAB  Heart: RRR no ectopy  Neuro: Gait normal. Reflexes normal and symmetric. Sensation grossly intact          Labs     Labs are reviewed and discussed with a patient  Lab Results   Component Value Date/Time    CHOLSTRLTOT 169 06/20/2017 07:22 AM    LDL 93 06/20/2017 07:22 AM    HDL 60 06/20/2017 07:22 AM    TRIGLYCERIDE 78 06/20/2017 07:22 AM       Lab Results   Component Value Date/Time    GLUCOSE 100 (H) 06/20/2017 07:22 AM     Lab Results   Component Value Date/Time    ASTSGOT 17 08/22/2014 01:17 PM      Lab Results   Component Value Date/Time    WBC 4.9 04/16/2012 09:57 AM    RBC 4.70 04/16/2012 09:57 AM    HEMOGLOBIN 14.1 04/16/2012 09:57 AM    HEMATOCRIT 41.9 04/16/2012 09:57 AM    MCV 89.2 04/16/2012 09:57 AM    MCH 29.9 04/16/2012 09:57 AM    MCHC 33.6 04/16/2012 09:57 AM    MPV 6.6 (L) 04/16/2012 " 09:57 AM    NEUTSPOLYS 46.9 04/16/2012 09:57 AM    LYMPHOCYTES 40.2 04/16/2012 09:57 AM    MONOCYTES 6.0 04/16/2012 09:57 AM    EOSINOPHILS 4.9 04/16/2012 09:57 AM    BASOPHILS 2.0 04/16/2012 09:57 AM      Imaging     None    ASSESMENT AND PLAN       1. Shift work sleep disorder  Refill meds  - zolpidem (AMBIEN) 10 MG Tab; Take 1 Tab by mouth at bedtime as needed for Sleep for up to 90 days.  Dispense: 30 Tab; Refill: 2  - ALPRAZolam (XANAX) 0.5 MG Tab; Take 1 Tab by mouth 3 times a day as needed for Sleep for up to 30 days.  Dispense: 60 Tab; Refill: 1    2. Situational depression  Start:   - FLUoxetine (PROZAC) 10 MG Cap; Take 1 cap QD for 10 days, then 2 cap QD po  Dispense: 180 Cap; Refill: 0    - Discussed about effects and side effects of medication.     3. Situational anxiety  - FLUoxetine (PROZAC) 10 MG Cap; Take 1 cap QD for 10 days, then 2 cap QD po  Dispense: 180 Cap; Refill: 0  4. Stress  As above.    Obtained and reviewed patient utilization report from [facility] pharmacy database on 3/26/2020 12:36 PM  prior to writing prescription for controlled substance II, III or IV per [place] bill . Based on assessment of the report, the prescription is medically necessary.     5. Health care maintenance  Due PAP    Smoking Counseling: Nonsmoker    Follow up: in 1 month, earlier prn    Please note that this dictation was created using voice recognition software. Despite all efforts, some minor grammar mistakes are possible.

## 2020-03-30 ENCOUNTER — TELEPHONE (OUTPATIENT)
Dept: MEDICAL GROUP | Age: 35
End: 2020-03-30

## 2020-03-30 NOTE — TELEPHONE ENCOUNTER
DOCUMENTATION OF PAR STATUS:    1. Name of Medication & Dose: Fluoxitine      2. Name of Prescription Coverage Company & phone #: [employer]     3. Date Prior Auth Submitted: 03/30/20    4. What information was given to obtain insurance decision? ICD 10 codes and chart notes     5. Prior Auth Status? Pending    6. Patient Notified: no    PA started for patient taking 2 tablets after 10 day titration.     Insurance stated that they will not cover more than 30 days if sent to a local pharmacy.

## 2020-04-01 NOTE — TELEPHONE ENCOUNTER
FINAL PRIOR AUTHORIZATION STATUS:    1.  Name of Medication & Dose: Fluoxitine      2. Prior Auth Status: Approved through 04/01/2021     3. Action Taken: Pharmacy Notified: yes Patient Notified: yes

## 2020-05-07 ENCOUNTER — TELEMEDICINE (OUTPATIENT)
Dept: MEDICAL GROUP | Age: 35
End: 2020-05-07
Payer: COMMERCIAL

## 2020-05-07 VITALS — HEIGHT: 66 IN | HEART RATE: 63 BPM | WEIGHT: 132 LBS | BODY MASS INDEX: 21.21 KG/M2 | TEMPERATURE: 98.6 F

## 2020-05-07 DIAGNOSIS — F41.8 SITUATIONAL ANXIETY: ICD-10-CM

## 2020-05-07 DIAGNOSIS — F43.21 SITUATIONAL DEPRESSION: ICD-10-CM

## 2020-05-07 DIAGNOSIS — F43.9 STRESS: ICD-10-CM

## 2020-05-07 DIAGNOSIS — Z00.00 HEALTH CARE MAINTENANCE: ICD-10-CM

## 2020-05-07 DIAGNOSIS — Z91.030 BEE STING ALLERGY: ICD-10-CM

## 2020-05-07 PROCEDURE — 99214 OFFICE O/P EST MOD 30 MIN: CPT | Mod: 95,CR | Performed by: INTERNAL MEDICINE

## 2020-05-07 RX ORDER — EPINEPHRINE 0.3 MG/.3ML
0.3 INJECTION SUBCUTANEOUS ONCE
Qty: 0.3 ML | Refills: 2 | Status: SHIPPED | OUTPATIENT
Start: 2020-05-07 | End: 2021-04-05 | Stop reason: SDUPTHER

## 2020-05-07 RX ORDER — ALPRAZOLAM 0.5 MG/1
0.5 TABLET ORAL PRN
COMMUNITY
End: 2020-06-01 | Stop reason: SDUPTHER

## 2020-05-07 ASSESSMENT — PATIENT HEALTH QUESTIONNAIRE - PHQ9: CLINICAL INTERPRETATION OF PHQ2 SCORE: 0

## 2020-05-07 ASSESSMENT — ANXIETY QUESTIONNAIRES
GAD7 TOTAL SCORE: 7
3. WORRYING TOO MUCH ABOUT DIFFERENT THINGS: SEVERAL DAYS
4. TROUBLE RELAXING: SEVERAL DAYS
5. BEING SO RESTLESS THAT IT IS HARD TO SIT STILL: SEVERAL DAYS
6. BECOMING EASILY ANNOYED OR IRRITABLE: SEVERAL DAYS
1. FEELING NERVOUS, ANXIOUS, OR ON EDGE: SEVERAL DAYS
7. FEELING AFRAID AS IF SOMETHING AWFUL MIGHT HAPPEN: SEVERAL DAYS
2. NOT BEING ABLE TO STOP OR CONTROL WORRYING: SEVERAL DAYS

## 2020-05-07 NOTE — PROGRESS NOTES
Telemedicine Visit: Established Patient     This Remote Face to Face encounter was conducted via [place]. Given the importance of social distancing and other strategies recommended to reduce the risk of COVID-19 transmission, I am providing medical care to this patient via audio/video visit in place of an in person visit at the request of the patient. Verbal consent to telehealth, risks, benefits, and consequences were discussed. Patient retains the right to withdraw at any time. All existing confidentiality protections apply. The patient has access to all transmitted medical information. No dissemination of any patient images or information to other entities without further written consent.  Subjective:   CC: anxiety, depression    Nickie Martin is a 34 y.o. female presenting for evaluation and management of:    Situational anxiety, depression, stress  -Eval course  -Improved anxiety and depression with current treatment  -Stress improved, still present    Onset:  adolescense  Course: worse in the last few weeks                - longterm boyfriend left              - epidemics Covid              - pending final exams at school  Mood/anxiety currently does not affect: daily activities/sleep.  Previous treatment:   xanax  Current treatment:  fluoxetine 20 mg per day,xanax prn     Risk factors:   · Depression, anxiety  · H/o phobia: no  · H/o panic attacks: no  · H/o hypomanic or manic episode: no  · Substance abuse  (alcohol,  prescription drugs caffeine, tobacco): no  · Family support: yes  · Living alone:  no  · Family history of psych disorders: yes  · Stress: no  · PMH of abuse (sexual, physical, emotional abuse; neglect): no   TOM-7 Questionnaire    Feeling nervous, anxious, or on edge: Several days  Not being able to sop or control worrying: Several days  Worrying too much about different things: Several days  Trouble relaxing: Several days  Being so restless that it's hard to sit still: Several days  Becoming  easily annoyed or irritable: Several days  Feeling afraid as if something awful might happen: Several days  Total: 7    Depression Screening  Little interest or pleasure in doing things?  0 - not at all  Feeling down, depressed , or hopeless? 0 - not at all  Patient Health Questionnaire Score: 0    Bee sting allergy  The patient has bee sting allergy for which she needs EpiPen, requested refill.     ROS     - Constitutional:  Negative for fever, chills, and fatigue.    - HEENT:  Negative for headaches, vision / hearing changes, ear pain, ear discharge, rhinorrhea, sinus congestion, sore throat.      - Respiratory:  Negative for cough, sputum production, chest congestion, dyspnea, wheezing.      - Cardiovascular:  Negative for chest pain, palpitations, orthopnea, LE edema.     - Gastrointestinal:  Negative for heartburn, N/V, abdominal pain, diarrhea, constipation.     - Genitourinary:  Negative for dysuria, frequency.    - Musculoskeletal:  Negative for muscle, back or joint pain.     - Skin:  Negative for rash, itching.     - Neurological:  Negative for dizziness, numbness, weakness, tingling, headache.     - Endo/Heme/Allergies:  Does not bruise/bleed easily.     - Psychiatric/Behavioral: Per HPI     Patient Active Problem List    Diagnosis Date Noted   • Situational depression 03/26/2020   • Situational anxiety 03/26/2020   • Stress 03/26/2020   • H/O lumpectomy 10/12/2018   • Bee sting allergy 11/09/2016   • Cholesteatoma of right ear 04/11/2016   • Shift work sleep disorder 10/22/2015   • Exercise-induced asthma 10/22/2015   • Health care maintenance 10/22/2015      Allergies:Bee venom  Current Outpatient Medications   Medication Sig Dispense Refill   • zolpidem (AMBIEN) 10 MG Tab Take 1 Tab by mouth at bedtime as needed for Sleep for up to 90 days. 30 Tab 2   • FLUoxetine (PROZAC) 10 MG Cap Take 1 cap QD for 10 days, then 2 cap QD po 180 Cap 0   • lactulose 10 GM/15ML Solution Take 15 mL by mouth every day.  "(Patient not taking: Reported on 3/26/2020) 1 Bottle 5   • EPINEPHrine (EPIPEN) 0.3 MG/0.3ML Solution Auto-injector solution for injection Inject by intramuscular route once as needed for anaphylaxis.     • ibuprofen (MOTRIN) 200 MG Tab Take 400 mg by mouth 1 time daily as needed.     • multivitamin (THERAGRAN) Tab Take 1 Tab by mouth every day.       No current facility-administered medications for this visit.      Social History     Tobacco Use   • Smoking status: Never Smoker   • Smokeless tobacco: Never Used   Substance Use Topics   • Alcohol use: Yes     Alcohol/week: 0.6 oz     Types: 1 Standard drinks or equivalent per week     Frequency: 4 or more times a week     Drinks per session: 1 or 2     Binge frequency: Never     Comment: 3-4 per week   • Drug use: Yes     Types: Marijuana     Comment: rare     Social History     Social History Narrative    Lives with .     Children: 1    Work: [employer]     Family History   Problem Relation Age of Onset   • Allergies Paternal Grandfather         asthma   • Diabetes Paternal Grandmother    • Hypertension Maternal Grandmother    • Cancer Neg Hx    • Heart Disease Neg Hx    • Hyperlipidemia Neg Hx    • Stroke Neg Hx    • Thyroid Neg Hx       Objective:   Vitals obtained by patient:  Pulse 63   Temperature 37 °C (98.6 °F) (Temporal)   Height 1.676 m (5' 6\")   Weight 59.9 kg (132 lb)   Body Mass Index 21.31 kg/m²   Physical Exam:  Constitutional: Alert, no distress, well-groomed.  Skin: No rash in visible areas.  Eye: Round. Conjunctiva clear, lids normal.  ENMT: Lips pink without lesions, good dentition. Phonation normal.  Neck: No visible masses or thyromegaly. Moves freely without pain.  CV: Pulse as reported by patient  Respiratory: Unlabored respiratory effort, no cough or audible wheeze  Psych: Alert and oriented x3, normal affect and mood.     Labs:     None    Imaging:     None    Assessment and Plan:   The following treatment plan was discussed: "     1. Situational anxiety  2. Situational depression  3. Stress  Controlled, continue current treatment    4. Bee sting allergy  Refill:  - EPINEPHrine (EPIPEN) 0.3 MG/0.3ML Solution Auto-injector solution for injection; 0.3 mL by Intramuscular route Once for 1 dose.  Dispense: 0.3 mL; Refill: 2    5. Health care maintenance  Due Pap smear and pneumonia shot    Follow-up: in 3 months

## 2020-05-30 ENCOUNTER — PATIENT MESSAGE (OUTPATIENT)
Dept: MEDICAL GROUP | Age: 35
End: 2020-05-30

## 2020-05-30 DIAGNOSIS — F43.9 STRESS: ICD-10-CM

## 2020-06-01 NOTE — TELEPHONE ENCOUNTER
From: Nickie Martin  To: Marci Villegas M.D.  Sent: 5/30/2020 9:53 PM PDT  Subject: Prescription Question    Hello,      I just saw you recently and you told me to message you when I need a refill. I need a refill on the Xanax please.     Thank you,    Nickie is

## 2020-06-02 RX ORDER — ALPRAZOLAM 0.5 MG/1
0.5 TABLET ORAL PRN
Qty: 30 TAB | Refills: 0 | Status: SHIPPED | OUTPATIENT
Start: 2020-06-02 | End: 2020-06-23

## 2020-06-23 ENCOUNTER — TELEMEDICINE (OUTPATIENT)
Dept: MEDICAL GROUP | Age: 35
End: 2020-06-23
Payer: COMMERCIAL

## 2020-06-23 VITALS
WEIGHT: 128 LBS | HEART RATE: 60 BPM | DIASTOLIC BLOOD PRESSURE: 72 MMHG | BODY MASS INDEX: 20.57 KG/M2 | HEIGHT: 66 IN | SYSTOLIC BLOOD PRESSURE: 98 MMHG

## 2020-06-23 DIAGNOSIS — F43.21 SITUATIONAL DEPRESSION: ICD-10-CM

## 2020-06-23 DIAGNOSIS — G47.26 SHIFT WORK SLEEP DISORDER: ICD-10-CM

## 2020-06-23 DIAGNOSIS — F43.9 STRESS: ICD-10-CM

## 2020-06-23 DIAGNOSIS — F41.8 SITUATIONAL ANXIETY: ICD-10-CM

## 2020-06-23 PROCEDURE — 99214 OFFICE O/P EST MOD 30 MIN: CPT | Mod: 95,CR | Performed by: INTERNAL MEDICINE

## 2020-06-23 RX ORDER — FLUOXETINE HYDROCHLORIDE 20 MG/1
CAPSULE ORAL
Qty: 90 CAP | Refills: 0 | Status: SHIPPED | OUTPATIENT
Start: 2020-06-23 | End: 2020-08-31

## 2020-06-23 RX ORDER — ZOLPIDEM TARTRATE 10 MG/1
10 TABLET ORAL NIGHTLY PRN
Qty: 30 TAB | Refills: 2 | Status: SHIPPED | OUTPATIENT
Start: 2020-06-23 | End: 2020-10-19 | Stop reason: SDUPTHER

## 2020-06-23 RX ORDER — ALPRAZOLAM 0.5 MG/1
0.5 TABLET ORAL PRN
Qty: 30 TAB | Refills: 2 | Status: SHIPPED | OUTPATIENT
Start: 2020-06-23 | End: 2020-09-30 | Stop reason: SDUPTHER

## 2020-06-23 ASSESSMENT — ANXIETY QUESTIONNAIRES
4. TROUBLE RELAXING: NEARLY EVERY DAY
6. BECOMING EASILY ANNOYED OR IRRITABLE: SEVERAL DAYS
5. BEING SO RESTLESS THAT IT IS HARD TO SIT STILL: NOT AT ALL
7. FEELING AFRAID AS IF SOMETHING AWFUL MIGHT HAPPEN: SEVERAL DAYS
GAD7 TOTAL SCORE: 14
2. NOT BEING ABLE TO STOP OR CONTROL WORRYING: NEARLY EVERY DAY
3. WORRYING TOO MUCH ABOUT DIFFERENT THINGS: NEARLY EVERY DAY
1. FEELING NERVOUS, ANXIOUS, OR ON EDGE: NEARLY EVERY DAY

## 2020-06-23 ASSESSMENT — PATIENT HEALTH QUESTIONNAIRE - PHQ9
CLINICAL INTERPRETATION OF PHQ2 SCORE: 1
5. POOR APPETITE OR OVEREATING: 1 - SEVERAL DAYS
SUM OF ALL RESPONSES TO PHQ QUESTIONS 1-9: 6

## 2020-06-23 NOTE — PROGRESS NOTES
Telemedicine Visit: Established Patient     This Remote Face to Face encounter was conducted via [employer]. Given the importance of social distancing and other strategies recommended to reduce the risk of COVID-19 transmission, I am providing medical care to this patient via audio/video visit in place of an in person visit at the request of the patient. Verbal consent to telehealth, risks, benefits, and consequences were discussed. Patient retains the right to withdraw at any time. All existing confidentiality protections apply. The patient has access to all transmitted medical information. No dissemination of any patient images or information to other entities without further written consent.    CHIEF COMPLAINT     Chief Complaint   Patient presents with   • Medication Refill   Anxiety    HPI  Nickie Martin is a 34 y.o. female who presents today for the following     Situational anxiety, depression, stress  -Interval  course  -Improved anxiety and depression with current treatment  -Stress improved, still present: has final RN exam now, start new job in 1 week     Onset:  adolescense  Course: worse in the last few weeks                - longterm boyfriend left              - epidemics Covid              - pending final exams at school  Mood/anxiety currently does not affect: daily activities/sleep.  Previous treatment:   xanax  Current treatment:  fluoxetine 20 mg per day, xanax prn, ambien prn     Risk factors:   ·           Depression, anxiety  ·           H/o phobia: no  ·           H/o panic attacks: no  ·           H/o hypomanic or manic episode: no  ·           Substance abuse  (alcohol,  prescription drugs caffeine, tobacco): no  ·           Family support: yes  ·           Living alone:  no  ·           Family history of psych disorders: yes  ·           Stress: no  ·           PMH of abuse (sexual, physical, emotional abuse; neglect): no     TOM-7 Questionnaire  Feeling nervous, anxious, or on edge: Nearly  every day  Not being able to sop or control worrying: Nearly every day  Worrying too much about different things: Nearly every day  Trouble relaxing: Nearly every day  Being so restless that it's hard to sit still: Not at all  Becoming easily annoyed or irritable: Several days  Feeling afraid as if something awful might happen: Several days  Total: 14    Depression Screening  Little interest or pleasure in doing things?  0 - not at all   Feeling down, depressed , or hopeless? 1 - several days   Trouble falling or staying asleep, or sleeping too much?  3 - nearly every day   Feeling tired or having little energy?  0 - not at all   Poor appetite or overeating?  1 - several days   Feeling bad about yourself - or that you are a failure or have let yourself or your family down? 0 - not at all   Trouble concentrating on things, such as reading the newspaper or watching television? 1 - several days   Moving or speaking so slowly that other people could have noticed.  Or the opposite - being so fidgety or restless that you have been moving around a lot more than usual?  0 - not at all   Thoughts that you would be better off dead, or of hurting yourself?  0 - not at all   Patient Health Questionnaire Score: 6     Reviewed PMH, PSH, FH, SH, ALL, HCM/IMM, no changes  Reviewed MEDS, no changes    Patient Active Problem List    Diagnosis Date Noted   • Situational depression 03/26/2020   • Situational anxiety 03/26/2020   • Stress 03/26/2020   • H/O lumpectomy 10/12/2018   • Bee sting allergy 11/09/2016   • Cholesteatoma of right ear 04/11/2016   • Shift work sleep disorder 10/22/2015   • Exercise-induced asthma 10/22/2015   • Health care maintenance 10/22/2015     CURRENT MEDICATIONS  Current Outpatient Medications   Medication Sig Dispense Refill   • FLUoxetine (PROZAC) 20 MG Cap Take 2 cap QD po 90 Cap 0   • zolpidem (AMBIEN) 10 MG Tab Take 1 Tab by mouth at bedtime as needed for Sleep for up to 90 days. 30 Tab 2   •  ALPRAZolam (XANAX) 0.5 MG Tab Take 1 Tab by mouth as needed for Sleep for up to 90 days. 30 Tab 2   • lactulose 10 GM/15ML Solution Take 15 mL by mouth every day. (Patient not taking: Reported on 3/26/2020) 1 Bottle 5   • EPINEPHrine (EPIPEN) 0.3 MG/0.3ML Solution Auto-injector solution for injection Inject by intramuscular route once as needed for anaphylaxis.     • ibuprofen (MOTRIN) 200 MG Tab Take 400 mg by mouth 1 time daily as needed.     • multivitamin (THERAGRAN) Tab Take 1 Tab by mouth every day.       No current facility-administered medications for this visit.      ALLERGIES  Allergies: Bee venom  PAST MEDICAL HISTORY  Past Medical History:   Diagnosis Date   • Anesthesia     PONV   • ASTHMA     exercise induced   • Cholesteatoma     right   • Insomnia      SURGICAL HISTORY  She  has a past surgical history that includes pr anesth,ear surgery (2000); mammoplasty augmentation (4/18/2012); pr enlarge breast with implant; and breast biopsy (Right, 8/4/2017).  SOCIAL HISTORY  Social History     Tobacco Use   • Smoking status: Never Smoker   • Smokeless tobacco: Never Used   Substance Use Topics   • Alcohol use: Yes     Alcohol/week: 0.6 oz     Types: 1 Standard drinks or equivalent per week     Frequency: 4 or more times a week     Drinks per session: 1 or 2     Binge frequency: Never     Comment: 3-4 per week   • Drug use: Not Currently     Types: Marijuana     Comment: rare     Social History     Social History Narrative    Lives with .     Children: 1    Work: [employer]     FAMILY HISTORY  Family History   Problem Relation Age of Onset   • Allergies Paternal Grandfather         asthma   • Diabetes Paternal Grandmother    • Hypertension Maternal Grandmother    • Cancer Neg Hx    • Heart Disease Neg Hx    • Hyperlipidemia Neg Hx    • Stroke Neg Hx    • Thyroid Neg Hx      Family Status   Relation Name Status   • PGFa  (Not Specified)   • PGMo  (Not Specified)   • MGMo  (Not Specified)   • Neg Hx  (Not  "Specified)     ROS   Constitutional: Negative for fever, chills, fatigue.  HENT: Negative for congestion, sore throat.  Eyes: Negative for vision problems.   Respiratory: Negative for cough, shortness of breath.  Cardiovascular: Negative for chest pain, palpitations.   Gastrointestinal: Negative for heartburn, nausea, abdominal pain.   Genitourinary: Negative for dysuria.  Musculoskeletal: Negative for significant myalgia, back and joint pain.   Skin: Negative for rash.   Neuro: Negative for dizziness, weakness and headaches.   Endo/Heme/Allergies: Does not bruise/bleed easily.   Psychiatric/Behavioral: As above.    Objective   Vitals obtained by patient:  Blood Pressure (Abnormal) 98/72 (BP Location: Right arm, Patient Position: Sitting)   Pulse 60   Height 1.676 m (5' 6\")   Weight 58.1 kg (128 lb)   Body Mass Index 20.66 kg/m²   Physical Exam:  Constitutional: Alert, no distress, well-groomed.  Skin: No rash in visible areas.  Eye: Round. Conjunctiva clear, lids normal.  ENMT: Lips pink without lesions, good dentition. Phonation normal.  Neck: No visible masses or thyromegaly. Moves freely without pain.  CV: no peripheral cyanosis, tachycardia.  Respiratory: Unlabored respiratory effort, no cough or audible wheezing.  Psych: Alert and oriented x3, normal affect and mood.     Labs     Labs are reviewed and discussed with a patient  Lab Results   Component Value Date/Time    CHOLSTRLTOT 169 06/20/2017 07:22 AM    LDL 93 06/20/2017 07:22 AM    HDL 60 06/20/2017 07:22 AM    TRIGLYCERIDE 78 06/20/2017 07:22 AM       Lab Results   Component Value Date/Time    GLUCOSE 100 (H) 06/20/2017 07:22 AM     Lab Results   Component Value Date/Time    ASTSGOT 17 08/22/2014 01:17 PM      No results found for: HBA1C  No results found for: TSH  No results found for: FREET4    Lab Results   Component Value Date/Time    WBC 4.9 04/16/2012 09:57 AM    RBC 4.70 04/16/2012 09:57 AM    HEMOGLOBIN 14.1 04/16/2012 09:57 AM    HEMATOCRIT " 41.9 04/16/2012 09:57 AM    MCV 89.2 04/16/2012 09:57 AM    MCH 29.9 04/16/2012 09:57 AM    MCHC 33.6 04/16/2012 09:57 AM    MPV 6.6 (L) 04/16/2012 09:57 AM    NEUTSPOLYS 46.9 04/16/2012 09:57 AM    LYMPHOCYTES 40.2 04/16/2012 09:57 AM    MONOCYTES 6.0 04/16/2012 09:57 AM    EOSINOPHILS 4.9 04/16/2012 09:57 AM    BASOPHILS 2.0 04/16/2012 09:57 AM      Imaging      None    Assessment and Plan     Nickie Martin is a 34 y.o. female    1. Situational anxiety  Likely still present, as she has no current final exam, pending new job.  We discussed treatment options, and will be will stay at same medication regimen  Patient will contact me if any concerns or uncontrolled symptoms after she settles down with her changes in life  - FLUoxetine (PROZAC) 20 MG Cap; Take 2 cap QD po  Dispense: 90 Cap; Refill: 0    2. Situational depression  As above  - FLUoxetine (PROZAC) 20 MG Cap; Take 2 cap QD po  Dispense: 90 Cap; Refill: 0  3. Shift work sleep disorder  - zolpidem (AMBIEN) 10 MG Tab; Take 1 Tab by mouth at bedtime as needed for Sleep for up to 90 days.  Dispense: 30 Tab; Refill: 2  4. Stress  - ALPRAZolam (XANAX) 0.5 MG Tab; Take 1 Tab by mouth as needed for Sleep for up to 90 days.  Dispense: 30 Tab; Refill: 2    Obtained and reviewed patient utilization report from [facility] pharmacy database on 6/23/2020 10:06 AM  prior to writing prescription for controlled substance II, III or IV per [place] bill . Based on assessment of the report, the prescription is medically necessary.     Follow-up: In 3 months and as needed

## 2020-08-04 ENCOUNTER — PATIENT MESSAGE (OUTPATIENT)
Dept: MEDICAL GROUP | Age: 35
End: 2020-08-04

## 2020-08-04 RX ORDER — ALBUTEROL SULFATE 90 UG/1
2 AEROSOL, METERED RESPIRATORY (INHALATION) EVERY 6 HOURS PRN
Qty: 8.5 G | Refills: 3 | Status: SHIPPED | OUTPATIENT
Start: 2020-08-04 | End: 2022-07-28 | Stop reason: SDUPTHER

## 2020-08-04 NOTE — PATIENT COMMUNICATION
Received request via: Patient    Was the patient seen in the last year in this department? Yes    Does the patient have an active prescription (recently filled or refills available) for medication(s) requested? No     Patient was asking for albuterol inhaler Proair HFA, is this the same one?

## 2020-08-04 NOTE — TELEPHONE ENCOUNTER
From: Nickie Martin  To: Marci Villegas M.D.  Sent: 8/4/2020 12:10 PM PDT  Subject: Non-Urgent Medical Question    Hello,    Can I get a refill for my albuterol inhaler? Proair HFA     Thank you    Nickie

## 2020-08-31 DIAGNOSIS — F41.8 SITUATIONAL ANXIETY: ICD-10-CM

## 2020-08-31 DIAGNOSIS — F43.21 SITUATIONAL DEPRESSION: ICD-10-CM

## 2020-08-31 RX ORDER — FLUOXETINE HYDROCHLORIDE 20 MG/1
CAPSULE ORAL
Qty: 30 CAP | Refills: 0 | Status: SHIPPED | OUTPATIENT
Start: 2020-08-31 | End: 2020-09-30 | Stop reason: SDUPTHER

## 2020-09-30 ENCOUNTER — TELEMEDICINE (OUTPATIENT)
Dept: MEDICAL GROUP | Age: 35
End: 2020-09-30
Payer: COMMERCIAL

## 2020-09-30 VITALS — TEMPERATURE: 97.3 F | WEIGHT: 130 LBS | HEART RATE: 61 BPM | HEIGHT: 66 IN | BODY MASS INDEX: 20.89 KG/M2

## 2020-09-30 DIAGNOSIS — F43.9 STRESS: ICD-10-CM

## 2020-09-30 DIAGNOSIS — F43.21 SITUATIONAL DEPRESSION: ICD-10-CM

## 2020-09-30 DIAGNOSIS — Z00.00 HEALTH CARE MAINTENANCE: ICD-10-CM

## 2020-09-30 DIAGNOSIS — Z00.00 ANNUAL PHYSICAL EXAM: ICD-10-CM

## 2020-09-30 DIAGNOSIS — Z98.890 H/O LUMPECTOMY: ICD-10-CM

## 2020-09-30 DIAGNOSIS — J45.990 EXERCISE-INDUCED ASTHMA: ICD-10-CM

## 2020-09-30 DIAGNOSIS — F41.8 SITUATIONAL ANXIETY: ICD-10-CM

## 2020-09-30 DIAGNOSIS — H71.91 CHOLESTEATOMA OF RIGHT EAR: ICD-10-CM

## 2020-09-30 DIAGNOSIS — F51.01 PRIMARY INSOMNIA: ICD-10-CM

## 2020-09-30 DIAGNOSIS — Z91.030 BEE STING ALLERGY: ICD-10-CM

## 2020-09-30 PROCEDURE — 99395 PREV VISIT EST AGE 18-39: CPT | Mod: 95,CR | Performed by: INTERNAL MEDICINE

## 2020-09-30 RX ORDER — ALPRAZOLAM 0.5 MG/1
0.5 TABLET ORAL PRN
Qty: 30 TAB | Refills: 2 | Status: SHIPPED | OUTPATIENT
Start: 2020-09-30 | End: 2021-02-16 | Stop reason: SDUPTHER

## 2020-09-30 RX ORDER — FLUOXETINE HYDROCHLORIDE 20 MG/1
CAPSULE ORAL
Qty: 90 CAP | Refills: 0 | Status: SHIPPED | OUTPATIENT
Start: 2020-09-30 | End: 2020-11-25 | Stop reason: SDUPTHER

## 2020-09-30 ASSESSMENT — ANXIETY QUESTIONNAIRES
7. FEELING AFRAID AS IF SOMETHING AWFUL MIGHT HAPPEN: SEVERAL DAYS
6. BECOMING EASILY ANNOYED OR IRRITABLE: SEVERAL DAYS
GAD7 TOTAL SCORE: 6
1. FEELING NERVOUS, ANXIOUS, OR ON EDGE: SEVERAL DAYS
2. NOT BEING ABLE TO STOP OR CONTROL WORRYING: SEVERAL DAYS
4. TROUBLE RELAXING: SEVERAL DAYS
3. WORRYING TOO MUCH ABOUT DIFFERENT THINGS: SEVERAL DAYS
5. BEING SO RESTLESS THAT IT IS HARD TO SIT STILL: NOT AT ALL

## 2020-09-30 ASSESSMENT — PATIENT HEALTH QUESTIONNAIRE - PHQ9: CLINICAL INTERPRETATION OF PHQ2 SCORE: 0

## 2020-09-30 NOTE — PROGRESS NOTES
Telemedicine Visit: Established Patient     This Remote Face to Face encounter was conducted via Zoom. Given the importance of social distancing and other strategies recommended to reduce the risk of COVID-19 transmission, I am providing medical care to this patient via audio/video visit in place of an in person visit at the request of the patient. Verbal consent to telehealth, risks, benefits, and consequences were discussed. Patient retains the right to withdraw at any time. All existing confidentiality protections apply. The patient has access to all transmitted medical information. No dissemination of any patient images or information to other entities without further written consent.    CHIEF COMPLAINT     Chief Complaint   Patient presents with   • Medication Refill     prozac     HPI  Nickie Martin is a 34 y.o. female who presents today for the following     Recommendations:  Regular exercise at least 4 days a week  Diet: advised balanced diet  Dental exam at least 1-2 times per year  Sunscreen use: advised     Immunizations:  TdaP: 2 yrs ago, will send report     GYN   Last PAP: 11/2016, normal   Abnormal PAP: no     Menarche:   Menses every month with bleeding for 5 days  No excess cramping or bleeding.   Takes OTC analgesics for cramping  Contraception: Mirena  Hx STD: no    Situational anxiety, depression, insomnia, stress  -Interval  course  -Improved anxiety and depression with current treatment  -Stress improved, started working as hospitalist      Onset:  adolescense  Course: worse in the last few weeks                - longterm boyfriend left              - epidemics Covid              - pending final exams at school  Mood/anxiety currently does not affect: daily activities/sleep.  Previous treatment:   xanax  Current treatment:  fluoxetine 20 mg per day, xanax prn, ambien prn     Risk factors:   ·           Depression, anxiety  ·           H/o phobia: no  ·           H/o panic attacks: no  ·            H/o hypomanic or manic episode: no  ·           Substance abuse  (alcohol,  prescription drugs caffeine, tobacco): no  ·           Family support: yes  ·           Living alone:  no  ·           Family history of psych disorders: yes  ·           Stress: no  ·           PMH of abuse (sexual, physical, emotional abuse; neglect): no      TOM 7 5/7/2020 6/23/2020 9/30/2020   Total Score 7 14 6       Depression Screen (PHQ-2/PHQ-9) 5/7/2020 6/23/2020 9/30/2020   PHQ-2 Total Score 0 1 0   PHQ-9 Total Score - 6 -     Right cholesteatoma  Currently asymptomatic, stable, f/u by ENT.    Bee sting allergy  She has severe allergy reaction to bee sting, has EpiPen at home.     Exercise induced asthma  Stable, used albuterol as needed before exercise.     H/o lumpectomy, RT  Benign fibroadenoma.    Reviewed PMH, PSH, FH, SH, ALL, HCM/IMM, no changes  Reviewed MEDS, no changes    Patient Active Problem List    Diagnosis Date Noted   • Situational depression 03/26/2020   • Situational anxiety 03/26/2020   • Stress 03/26/2020   • H/O lumpectomy 10/12/2018   • Bee sting allergy 11/09/2016   • Cholesteatoma of right ear 04/11/2016   • Shift work sleep disorder 10/22/2015   • Exercise-induced asthma 10/22/2015   • Health care maintenance 10/22/2015     CURRENT MEDICATIONS  Current Outpatient Medications   Medication Sig Dispense Refill   • FLUoxetine (PROZAC) 20 MG Cap TAKE TWO CAPSULES BY MOUTH EVERY DAY 30 Cap 0   • albuterol 108 (90 Base) MCG/ACT Aero Soln inhalation aerosol Inhale 2 Puffs by mouth every 6 hours as needed. 8.5 g 3   • EPINEPHrine (EPIPEN) 0.3 MG/0.3ML Solution Auto-injector solution for injection Inject by intramuscular route once as needed for anaphylaxis.       No current facility-administered medications for this visit.      ALLERGIES  Allergies: Bee venom  PAST MEDICAL HISTORY  Past Medical History:   Diagnosis Date   • Anesthesia     PONV   • ASTHMA     exercise induced   • Cholesteatoma     right   •  "Insomnia      SURGICAL HISTORY  She  has a past surgical history that includes pr anesth,ear surgery (2000); mammoplasty augmentation (4/18/2012); pr enlarge breast with implant; and breast biopsy (Right, 8/4/2017).  SOCIAL HISTORY  Social History     Tobacco Use   • Smoking status: Never Smoker   • Smokeless tobacco: Never Used   Substance Use Topics   • Alcohol use: Yes     Alcohol/week: 0.6 oz     Types: 1 Standard drinks or equivalent per week     Frequency: 4 or more times a week     Drinks per session: 1 or 2     Binge frequency: Never     Comment: 3-4 per week   • Drug use: Not Currently     Types: Marijuana     Comment: rare     Social History     Social History Narrative    Lives with .     Children: 1    Work: [employer]     FAMILY HISTORY  Family History   Problem Relation Age of Onset   • Allergies Paternal Grandfather         asthma   • Diabetes Paternal Grandmother    • Hypertension Maternal Grandmother    • Cancer Neg Hx    • Heart Disease Neg Hx    • Hyperlipidemia Neg Hx    • Stroke Neg Hx    • Thyroid Neg Hx      Family Status   Relation Name Status   • PGFa  (Not Specified)   • PGMo  (Not Specified)   • MGMo  (Not Specified)   • Neg Hx  (Not Specified)     ROS   Constitutional: Negative for fever, chills, fatigue.  HENT: Negative for congestion, sore throat.  Eyes: Negative for vision problems.   Respiratory: Negative for cough, shortness of breath.  Cardiovascular: Negative for chest pain, palpitations.   Gastrointestinal: Negative for heartburn, nausea, abdominal pain.   Genitourinary: Negative for dysuria.  Musculoskeletal: Negative for significant myalgia, back and joint pain.   Skin: Negative for rash.   Neuro: Negative for dizziness, weakness and headaches.   Endo/Heme/Allergies: Does not bruise/bleed easily.   Psychiatric/Behavioral: controlled depression.    Objective   Vitals obtained by patient:  Pulse 61   Temperature 36.3 °C (97.3 °F) (Temporal)   Height 1.676 m (5' 6\")   " Weight 59 kg (130 lb)   Body Mass Index 20.98 kg/m²   Physical Exam:  Constitutional: Alert, no distress, well-groomed.  Skin: No rash in visible areas.  Eye: Round. Conjunctiva clear, lids normal.  ENMT: Lips pink without lesions, good dentition. Phonation normal.  Neck: No visible masses or thyromegaly. Moves freely without pain.  CV: no peripheral cyanosis, tachycardia.  Respiratory: Unlabored respiratory effort, no cough or audible wheezing.  Psych: Alert and oriented x3, normal affect and mood.     Labs     Labs are reviewed and discussed with a patient  Lab Results   Component Value Date/Time    CHOLSTRLTOT 169 06/20/2017 07:22 AM    LDL 93 06/20/2017 07:22 AM    HDL 60 06/20/2017 07:22 AM    TRIGLYCERIDE 78 06/20/2017 07:22 AM       Lab Results   Component Value Date/Time    GLUCOSE 100 (H) 06/20/2017 07:22 AM     Lab Results   Component Value Date/Time    ASTSGOT 17 08/22/2014 01:17 PM      Lab Results   Component Value Date/Time    WBC 4.9 04/16/2012 09:57 AM    RBC 4.70 04/16/2012 09:57 AM    HEMOGLOBIN 14.1 04/16/2012 09:57 AM    HEMATOCRIT 41.9 04/16/2012 09:57 AM    MCV 89.2 04/16/2012 09:57 AM    MCH 29.9 04/16/2012 09:57 AM    MCHC 33.6 04/16/2012 09:57 AM    MPV 6.6 (L) 04/16/2012 09:57 AM    NEUTSPOLYS 46.9 04/16/2012 09:57 AM    LYMPHOCYTES 40.2 04/16/2012 09:57 AM    MONOCYTES 6.0 04/16/2012 09:57 AM    EOSINOPHILS 4.9 04/16/2012 09:57 AM    BASOPHILS 2.0 04/16/2012 09:57 AM      Imaging      None    Assessment and Plan     Nickie Martin is a 34 y.o. female      1. Annual physical exam  Reviewed PMH, PSH, FH, SH, ALL, MEDS, HCM/IMM.   Advised healthy habits, diet, exercise.    2. Health care maintenance  Advised to schedule appointment for flu vaccine    3. Situational anxiety  Controlled, continue with current treatment.  - FLUoxetine (PROZAC) 20 MG Cap; TAKE TWO CAPSULES BY MOUTH EVERY DAY  Dispense: 90 Cap; Refill: 0  4. Situational depression  - FLUoxetine (PROZAC) 20 MG Cap; TAKE TWO  CAPSULES BY MOUTH EVERY DAY  Dispense: 90 Cap; Refill: 0  5. Primary insomnia  6. Stress  - ALPRAZolam (XANAX) 0.5 MG Tab; Take 1 Tab by mouth as needed for Sleep for up to 90 days.  Dispense: 30 Tab; Refill: 2    Obtained and reviewed patient utilization report from [facility] pharmacy database on 9/30/2020 9:10 AM  prior to writing prescription for controlled substance II, III or IV per [place] bill . Based on assessment of the report, the prescription is medically necessary.     7. Cholesteatoma of right ear  Asymptomatic, continue ENT follow-up    8. Bee sting allergy  Advised to avoid trigger.She has EpiPen.    9. Exercise-induced asthma  No current symptoms, may use albuterol as needed    10. H/O lumpectomy  fibroadenoma    Follow-up: in 3 months, pap, med refill

## 2020-10-16 DIAGNOSIS — B37.9 YEAST INFECTION: ICD-10-CM

## 2020-10-16 RX ORDER — FLUCONAZOLE 150 MG/1
150 TABLET ORAL ONCE
Qty: 5 TAB | Refills: 1 | Status: SHIPPED | OUTPATIENT
Start: 2020-10-16 | End: 2020-10-16

## 2020-10-19 DIAGNOSIS — G47.26 SHIFT WORK SLEEP DISORDER: ICD-10-CM

## 2020-10-19 RX ORDER — ZOLPIDEM TARTRATE 10 MG/1
10 TABLET ORAL NIGHTLY PRN
Qty: 30 TAB | Refills: 2 | Status: SHIPPED | OUTPATIENT
Start: 2020-10-19 | End: 2021-02-03 | Stop reason: SDUPTHER

## 2020-11-25 ENCOUNTER — OFFICE VISIT (OUTPATIENT)
Dept: MEDICAL GROUP | Age: 35
End: 2020-11-25
Payer: COMMERCIAL

## 2020-11-25 ENCOUNTER — HOSPITAL ENCOUNTER (OUTPATIENT)
Dept: LAB | Facility: MEDICAL CENTER | Age: 35
End: 2020-11-25
Attending: INTERNAL MEDICINE
Payer: COMMERCIAL

## 2020-11-25 ENCOUNTER — HOSPITAL ENCOUNTER (OUTPATIENT)
Facility: MEDICAL CENTER | Age: 35
End: 2020-11-25
Attending: INTERNAL MEDICINE
Payer: COMMERCIAL

## 2020-11-25 VITALS
OXYGEN SATURATION: 98 % | DIASTOLIC BLOOD PRESSURE: 68 MMHG | HEART RATE: 60 BPM | SYSTOLIC BLOOD PRESSURE: 98 MMHG | TEMPERATURE: 98 F | HEIGHT: 66 IN | BODY MASS INDEX: 22.18 KG/M2 | WEIGHT: 138 LBS

## 2020-11-25 DIAGNOSIS — Z98.890 H/O LUMPECTOMY: ICD-10-CM

## 2020-11-25 DIAGNOSIS — F41.8 SITUATIONAL ANXIETY: ICD-10-CM

## 2020-11-25 DIAGNOSIS — Z12.4 SCREENING FOR MALIGNANT NEOPLASM OF CERVIX: ICD-10-CM

## 2020-11-25 DIAGNOSIS — J45.990 EXERCISE-INDUCED ASTHMA: ICD-10-CM

## 2020-11-25 DIAGNOSIS — Z01.419 WELL FEMALE EXAM WITH ROUTINE GYNECOLOGICAL EXAM: ICD-10-CM

## 2020-11-25 DIAGNOSIS — Z72.51 UNPROTECTED SEXUAL INTERCOURSE: ICD-10-CM

## 2020-11-25 DIAGNOSIS — Z00.00 HEALTH CARE MAINTENANCE: ICD-10-CM

## 2020-11-25 DIAGNOSIS — F51.01 PRIMARY INSOMNIA: ICD-10-CM

## 2020-11-25 DIAGNOSIS — H71.91 CHOLESTEATOMA OF RIGHT EAR: ICD-10-CM

## 2020-11-25 DIAGNOSIS — F43.21 SITUATIONAL DEPRESSION: ICD-10-CM

## 2020-11-25 DIAGNOSIS — Z91.030 BEE STING ALLERGY: ICD-10-CM

## 2020-11-25 PROBLEM — F43.9 STRESS: Status: RESOLVED | Noted: 2020-03-26 | Resolved: 2020-11-25

## 2020-11-25 LAB
HIV 1+2 AB+HIV1 P24 AG SERPL QL IA: NORMAL
TREPONEMA PALLIDUM IGG+IGM AB [PRESENCE] IN SERUM OR PLASMA BY IMMUNOASSAY: NORMAL

## 2020-11-25 PROCEDURE — 86695 HERPES SIMPLEX TYPE 1 TEST: CPT

## 2020-11-25 PROCEDURE — 87491 CHLMYD TRACH DNA AMP PROBE: CPT | Mod: 91

## 2020-11-25 PROCEDURE — 87491 CHLMYD TRACH DNA AMP PROBE: CPT

## 2020-11-25 PROCEDURE — 87389 HIV-1 AG W/HIV-1&-2 AB AG IA: CPT

## 2020-11-25 PROCEDURE — 88175 CYTOPATH C/V AUTO FLUID REDO: CPT

## 2020-11-25 PROCEDURE — 99395 PREV VISIT EST AGE 18-39: CPT | Performed by: INTERNAL MEDICINE

## 2020-11-25 PROCEDURE — 86780 TREPONEMA PALLIDUM: CPT

## 2020-11-25 PROCEDURE — 87591 N.GONORRHOEAE DNA AMP PROB: CPT

## 2020-11-25 PROCEDURE — 87624 HPV HI-RISK TYP POOLED RSLT: CPT

## 2020-11-25 PROCEDURE — 36415 COLL VENOUS BLD VENIPUNCTURE: CPT

## 2020-11-25 PROCEDURE — 87591 N.GONORRHOEAE DNA AMP PROB: CPT | Mod: 91

## 2020-11-25 RX ORDER — FLUOXETINE HYDROCHLORIDE 20 MG/1
CAPSULE ORAL
Qty: 90 CAP | Refills: 3 | Status: SHIPPED | OUTPATIENT
Start: 2020-11-25 | End: 2021-04-05 | Stop reason: SDUPTHER

## 2020-11-25 ASSESSMENT — ANXIETY QUESTIONNAIRES
6. BECOMING EASILY ANNOYED OR IRRITABLE: NOT AT ALL
7. FEELING AFRAID AS IF SOMETHING AWFUL MIGHT HAPPEN: NOT AT ALL
5. BEING SO RESTLESS THAT IT IS HARD TO SIT STILL: NOT AT ALL
2. NOT BEING ABLE TO STOP OR CONTROL WORRYING: NOT AT ALL
1. FEELING NERVOUS, ANXIOUS, OR ON EDGE: NOT AT ALL
GAD7 TOTAL SCORE: 0
4. TROUBLE RELAXING: NOT AT ALL
3. WORRYING TOO MUCH ABOUT DIFFERENT THINGS: NOT AT ALL

## 2020-11-25 NOTE — PROGRESS NOTES
CHIEF COMPLAINT     Chief Complaint   Patient presents with   • Gynecologic Exam     wants STD test   • Requesting Labs     HPI  Nickie IZAGUIRRE is a 34 y.o. female who presents today for PAP, med refill     Recommendations:  Regular exercise at least 4 days a week  Diet: advised balanced diet  Dental exam at least 1-2 times per year  Sunscreen use: advised     Immunizations:  TdaP: 2 yrs ago, will send report     GYN   Last PAP: 11/25/2020, normal   Abnormal PAP: no     Menarche:   Menses every month with bleeding for 5 days  No excess cramping or bleeding.   Takes OTC analgesics for cramping  Contraception: Mirena  Hx STD: no     Situational anxiety, depression  Interval  course  -Improved anxiety and depression with current treatment  -Stress improved, started working as hospitalist      Onset:  adolescense  Course: worse in the last few weeks                - longterm boyfriend left              - epidemics Covid              - pending final exams at school  Mood/anxiety currently does not affect: daily activities/sleep.  Previous treatment:   xanax  Current treatment:  fluoxetine 20 mg per day, xanax prn, ambien prn     Risk factors:   ·           Depression, anxiety  ·           H/o phobia: no  ·           H/o panic attacks: no  ·           H/o hypomanic or manic episode: no  ·           Substance abuse  (alcohol,  prescription drugs caffeine, tobacco): no  ·           Family support: yes  ·           Living alone:  no  ·           Family history of psych disorders: yes  ·           Stress: no  ·           PMH of abuse (sexual, physical, emotional abuse; neglect): no   Depression Screen (PHQ-2/PHQ-9) 5/7/2020 6/23/2020 9/30/2020   PHQ-2 Total Score 0 1 0   PHQ-9 Total Score - 6 -     Right cholesteatoma  Currently asymptomatic, stable, f/u by ENT.     Bee sting allergy  She has severe allergy reaction to bee sting, has EpiPen at home.     Exercise induced asthma  Stable, used albuterol as needed before  exercise.      H/o lumpectomy, RT  Benign fibroadenoma.    Unprotected intercourse  She has not have any STD symptoms, requested screening:  · Abnormal vaginal discharge/bleeding  · Dysuria  · Frequency  · Urgency  · Suprapubic discomfort  · Abdominal/flank pain  · Fever, chills  · Urine color/odor change    Reviewed PMH, PSH, FH, SH, ALL, HCM/IMM, no changes  Reviewed MEDS, no changes    Patient Active Problem List    Diagnosis Date Noted   • Primary insomnia 09/30/2020   • Situational depression 03/26/2020   • Situational anxiety 03/26/2020   • Stress 03/26/2020   • H/O lumpectomy 10/12/2018   • Bee sting allergy 11/09/2016   • Cholesteatoma of right ear 04/11/2016   • Exercise-induced asthma 10/22/2015   • Health care maintenance 10/22/2015     CURRENT MEDICATIONS  Current Outpatient Medications   Medication Sig Dispense Refill   • zolpidem (AMBIEN) 10 MG Tab Take 1 Tab by mouth at bedtime as needed for Sleep for up to 90 days. 30 Tab 2   • ALPRAZolam (XANAX) 0.5 MG Tab Take 1 Tab by mouth as needed for Sleep for up to 90 days. 30 Tab 2   • FLUoxetine (PROZAC) 20 MG Cap TAKE TWO CAPSULES BY MOUTH EVERY DAY 90 Cap 0   • albuterol 108 (90 Base) MCG/ACT Aero Soln inhalation aerosol Inhale 2 Puffs by mouth every 6 hours as needed. 8.5 g 3   • EPINEPHrine (EPIPEN) 0.3 MG/0.3ML Solution Auto-injector solution for injection Inject by intramuscular route once as needed for anaphylaxis.       No current facility-administered medications for this visit.      ALLERGIES  Allergies: Bee venom  PAST MEDICAL HISTORY  Past Medical History:   Diagnosis Date   • Anesthesia     PONV   • ASTHMA     exercise induced   • Cholesteatoma     right   • Insomnia      SURGICAL HISTORY  She  has a past surgical history that includes pr anesth,ear surgery (2000); mammoplasty augmentation (4/18/2012); pr enlarge breast with implant; and breast biopsy (Right, 8/4/2017).  SOCIAL HISTORY  Social History     Tobacco Use   • Smoking status: Never  "Smoker   • Smokeless tobacco: Never Used   Substance Use Topics   • Alcohol use: Yes     Alcohol/week: 0.6 oz     Types: 1 Standard drinks or equivalent per week     Frequency: 4 or more times a week     Drinks per session: 1 or 2     Binge frequency: Never     Comment: 3-4 per week   • Drug use: Not Currently     Types: Marijuana     Comment: rare     Social History     Social History Narrative    Lives with .     Children: 1    Work: [employer]     FAMILY HISTORY  Family History   Problem Relation Age of Onset   • Allergies Paternal Grandfather         asthma   • Diabetes Paternal Grandmother    • Hypertension Maternal Grandmother    • Cancer Neg Hx    • Heart Disease Neg Hx    • Hyperlipidemia Neg Hx    • Stroke Neg Hx    • Thyroid Neg Hx      Family Status   Relation Name Status   • PGFa  (Not Specified)   • PGMo  (Not Specified)   • MGMo  (Not Specified)   • Neg Hx  (Not Specified)     ROS   Constitutional: Negative for fever, chills, fatigue.  HENT: Negative for congestion, sore throat.  Eyes: Negative for vision problems.   Respiratory: Negative for cough, shortness of breath.  Cardiovascular: Negative for chest pain, palpitations.   Gastrointestinal: Negative for heartburn, nausea, abdominal pain.   Genitourinary: Negative for dysuria.  Musculoskeletal: Negative for significant myalgia, back and joint pain.   Skin: Negative for rash.   Neuro: Negative for dizziness, weakness and headaches.   Endo/Heme/Allergies: Does not bruise/bleed easily.   Psychiatric/Behavioral: Controlled depression.    Objective   Vitals obtained by patient:  Blood Pressure (Abnormal) 98/68 (BP Location: Left arm, Patient Position: Sitting, BP Cuff Size: Adult)   Pulse 60   Temperature 36.7 °C (98 °F) (Temporal)   Height 1.676 m (5' 6\")   Weight 62.6 kg (138 lb)   Oxygen Saturation 98%   Body Mass Index 22.27 kg/m²   Physical Exam:  Constitutional: Alert, no distress, well-groomed.  Skin: No rash in visible areas.  Eye: " Round. Conjunctiva clear, lids normal.  ENMT: Lips pink without lesions, good dentition. Phonation normal.  Neck: No visible masses or thyromegaly. Moves freely without pain.  CV: no peripheral cyanosis, tachycardia.  Respiratory: Unlabored respiratory effort, no cough or audible wheezing.  Psych: Alert and oriented x3, normal affect and mood.   GYN: No suprapubic tenderness.  Perineum and external genitalia normal without rash.   Vagina with without discharge, normal mucosa.  Cervix w/o visible lesions or discharge. No CMT  Uterus normal size, non-tender, no masses,   Adnexa w/o mass or tenderness.   PAP smear was obtained.    Labs     Labs are reviewed and discussed with a patient  Lab Results   Component Value Date/Time    CHOLSTRLTOT 169 06/20/2017 07:22 AM    LDL 93 06/20/2017 07:22 AM    HDL 60 06/20/2017 07:22 AM    TRIGLYCERIDE 78 06/20/2017 07:22 AM       Lab Results   Component Value Date/Time    GLUCOSE 100 (H) 06/20/2017 07:22 AM     Lab Results   Component Value Date/Time    ASTSGOT 17 08/22/2014 01:17 PM      No results found for: HBA1C  No results found for: TSH  No results found for: FREET4    Lab Results   Component Value Date/Time    WBC 4.9 04/16/2012 09:57 AM    RBC 4.70 04/16/2012 09:57 AM    HEMOGLOBIN 14.1 04/16/2012 09:57 AM    HEMATOCRIT 41.9 04/16/2012 09:57 AM    MCV 89.2 04/16/2012 09:57 AM    MCH 29.9 04/16/2012 09:57 AM    MCHC 33.6 04/16/2012 09:57 AM    MPV 6.6 (L) 04/16/2012 09:57 AM    NEUTSPOLYS 46.9 04/16/2012 09:57 AM    LYMPHOCYTES 40.2 04/16/2012 09:57 AM    MONOCYTES 6.0 04/16/2012 09:57 AM    EOSINOPHILS 4.9 04/16/2012 09:57 AM    BASOPHILS 2.0 04/16/2012 09:57 AM      Imaging      None    Assessment and Plan     Nickie IZAGUIRRE is a 34 y.o. female    1. Well female exam with routine gynecological exam  - THINPREP PAP WITH HPV; Future  2. Screening for malignant neoplasm of cervix  - THINPREP PAP WITH HPV; Future  3. Health care maintenance  - THINPREP PAP WITH HPV;  Future    4. Situational anxiety  Controlled, continue with current treatment.  - FLUoxetine (PROZAC) 20 MG Cap; TAKE TWO CAPSULES BY MOUTH EVERY DAY  Dispense: 90 Cap; Refill: 3  5. Situational depression  - FLUoxetine (PROZAC) 20 MG Cap; TAKE TWO CAPSULES BY MOUTH EVERY DAY  Dispense: 90 Cap; Refill: 3  6. Primary insomnia  As above    7. Unprotected sexual intercourse  Advised to use condom with each intercourse  - HSV I SPECIFIC IGG AB; Future  - CHLAMYDIA/GC PCR URINE OR SWAB; Future  - HIV AG/AB COMBO ASSAY SCREENING; Future  - RPR    8. Cholesteatoma of right ear  No significant symptoms, continue ENT follow-up    9. Bee sting allergy  She has EpiPen    10. Exercise-induced asthma  She used albuterol as needed    11. H/O lumpectomy  In 2017, fibroadenoma    Follow-up: In 1 year and as needed

## 2020-11-26 LAB
C TRACH DNA SPEC QL NAA+PROBE: NEGATIVE
N GONORRHOEA DNA SPEC QL NAA+PROBE: NEGATIVE
SPECIMEN SOURCE: NORMAL

## 2020-11-28 LAB
C TRACH DNA GENITAL QL NAA+PROBE: NEGATIVE
CYTOLOGY REG CYTOL: ABNORMAL
HPV HR 12 DNA CVX QL NAA+PROBE: POSITIVE
HPV16 DNA SPEC QL NAA+PROBE: NEGATIVE
HPV18 DNA SPEC QL NAA+PROBE: NEGATIVE
HSV1 GG IGG SER-ACNC: 0.13 IV
N GONORRHOEA DNA GENITAL QL NAA+PROBE: NEGATIVE
SPECIMEN SOURCE: ABNORMAL
SPECIMEN SOURCE: ABNORMAL

## 2021-02-03 ENCOUNTER — PATIENT MESSAGE (OUTPATIENT)
Dept: MEDICAL GROUP | Age: 36
End: 2021-02-03

## 2021-02-03 DIAGNOSIS — G47.26 SHIFT WORK SLEEP DISORDER: ICD-10-CM

## 2021-02-03 RX ORDER — ZOLPIDEM TARTRATE 10 MG/1
10 TABLET ORAL NIGHTLY PRN
Qty: 30 TAB | Refills: 2 | Status: SHIPPED | OUTPATIENT
Start: 2021-02-03 | End: 2021-05-06 | Stop reason: SDUPTHER

## 2021-02-15 ENCOUNTER — PATIENT MESSAGE (OUTPATIENT)
Dept: MEDICAL GROUP | Age: 36
End: 2021-02-15

## 2021-02-15 DIAGNOSIS — F43.9 STRESS: ICD-10-CM

## 2021-02-18 RX ORDER — ALPRAZOLAM 0.5 MG/1
0.5 TABLET ORAL PRN
Qty: 30 TABLET | Refills: 0 | Status: SHIPPED | OUTPATIENT
Start: 2021-02-18 | End: 2021-04-05 | Stop reason: SDUPTHER

## 2021-04-05 ENCOUNTER — TELEMEDICINE (OUTPATIENT)
Dept: MEDICAL GROUP | Age: 36
End: 2021-04-05
Payer: COMMERCIAL

## 2021-04-05 VITALS — HEIGHT: 66 IN | HEART RATE: 70 BPM | BODY MASS INDEX: 22.18 KG/M2 | WEIGHT: 138 LBS

## 2021-04-05 DIAGNOSIS — F43.21 SITUATIONAL DEPRESSION: ICD-10-CM

## 2021-04-05 DIAGNOSIS — F41.8 SITUATIONAL ANXIETY: ICD-10-CM

## 2021-04-05 DIAGNOSIS — F43.9 STRESS: ICD-10-CM

## 2021-04-05 DIAGNOSIS — F51.01 PRIMARY INSOMNIA: ICD-10-CM

## 2021-04-05 DIAGNOSIS — Z91.030 BEE STING ALLERGY: ICD-10-CM

## 2021-04-05 PROCEDURE — 99214 OFFICE O/P EST MOD 30 MIN: CPT | Mod: 95,CR | Performed by: INTERNAL MEDICINE

## 2021-04-05 RX ORDER — ALPRAZOLAM 0.5 MG/1
0.5 TABLET ORAL PRN
Qty: 30 TABLET | Refills: 2 | Status: SHIPPED | OUTPATIENT
Start: 2021-04-05 | End: 2021-07-12 | Stop reason: SDUPTHER

## 2021-04-05 RX ORDER — EPINEPHRINE 0.3 MG/.3ML
0.3 INJECTION SUBCUTANEOUS ONCE
Qty: 0.3 ML | Refills: 3 | Status: SHIPPED | OUTPATIENT
Start: 2021-04-05 | End: 2021-04-05

## 2021-04-05 RX ORDER — FLUOXETINE 10 MG/1
CAPSULE ORAL
Qty: 180 CAPSULE | Refills: 1 | Status: ON HOLD
Start: 2021-04-05 | End: 2021-06-01

## 2021-04-05 ASSESSMENT — ANXIETY QUESTIONNAIRES
3. WORRYING TOO MUCH ABOUT DIFFERENT THINGS: NOT AT ALL
2. NOT BEING ABLE TO STOP OR CONTROL WORRYING: NOT AT ALL
6. BECOMING EASILY ANNOYED OR IRRITABLE: NOT AT ALL
GAD7 TOTAL SCORE: 1
5. BEING SO RESTLESS THAT IT IS HARD TO SIT STILL: NOT AT ALL
4. TROUBLE RELAXING: NOT AT ALL
1. FEELING NERVOUS, ANXIOUS, OR ON EDGE: SEVERAL DAYS
7. FEELING AFRAID AS IF SOMETHING AWFUL MIGHT HAPPEN: NOT AT ALL

## 2021-04-05 ASSESSMENT — PATIENT HEALTH QUESTIONNAIRE - PHQ9: CLINICAL INTERPRETATION OF PHQ2 SCORE: 0

## 2021-04-05 NOTE — PROGRESS NOTES
Telemedicine Visit: Established Patient     This Remote Face to Face encounter was conducted via Zoom. Given the importance of social distancing and other strategies recommended to reduce the risk of COVID-19 transmission, I am providing medical care to this patient via audio/video visit in place of an in person visit at the request of the patient. Verbal consent to telehealth, risks, benefits, and consequences were discussed. Patient retains the right to withdraw at any time. All existing confidentiality protections apply. The patient has access to all transmitted medical information. No dissemination of any patient images or information to other entities without further written consent.    CHIEF COMPLAINT     Nickie IZAGUIRRE is a 35 y.o. 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  Previous treatment:   xanax  Current treatment:  fluoxetine 20 mg per day, xanax prn, ambien prn     Risk factors:   ·           Depression, anxiety  ·           H/o phobia: no  ·           H/o panic attacks: no  ·           H/o hypomanic or manic episode: no  ·           Substance abuse  (alcohol,  prescription drugs caffeine, tobacco): no  ·           Family support: yes  ·           Living alone:  no  ·           Family history of psych disorders: yes  ·           Stress: no  ·           PMH of abuse (sexual, physical, emotional abuse; neglect): no     Current Outpatient Medications   Medication Sig Dispense Refill   • ALPRAZolam (XANAX) 0.5 MG Tab Take 1 tablet by mouth as needed for Sleep for up  to 90 days. 30 tablet 2   • zolpidem (AMBIEN) 10 MG Tab Take 1 Tab by mouth at bedtime as needed for Sleep for up to 90 days. 30 Tab 2   • FLUoxetine (PROZAC) 20 MG Cap TAKE TWO CAPSULES BY MOUTH EVERY DAY 90 Cap 3   • albuterol 108 (90 Base) MCG/ACT Aero Soln inhalation aerosol Inhale 2 Puffs by mouth every 6 hours as needed. 8.5 g 3   • EPINEPHrine (EPIPEN) 0.3 MG/0.3ML Solution Auto-injector solution for injection Inject by intramuscular route once as needed for anaphylaxis.       No current facility-administered medications for this visit.     She  has a past medical history of Anesthesia, ASTHMA, Cholesteatoma, and Insomnia.  She  has a past surgical history that includes pr anesth,ear surgery (2000); mammoplasty augmentation (4/18/2012); pr breast augmentation with implant; and breast biopsy (Right, 8/4/2017).  Social History     Tobacco Use   • Smoking status: Never Smoker   • Smokeless tobacco: Never Used   Substance Use Topics   • Alcohol use: Yes     Alcohol/week: 0.6 oz     Types: 1 Standard drinks or equivalent per week     Comment: 3-4 per week   • Drug use: Not Currently     Types: Marijuana     Comment: rare     Social History     Social History Narrative    Lives with .     Children: 1    Work: [employer]     Family History   Problem Relation Age of Onset   • Allergies Paternal Grandfather         asthma   • Diabetes Paternal Grandmother    • Hypertension Maternal Grandmother    • Cancer Neg Hx    • Heart Disease Neg Hx    • Hyperlipidemia Neg Hx    • Stroke Neg Hx    • Thyroid Neg Hx      Family Status   Relation Name Status   • PGFa  (Not Specified)   • PGMo  (Not Specified)   • MGMo  (Not Specified)   • Neg Hx  (Not Specified)     ROS   Taking supplements to help mood or symptoms: yes  Using alcohol or other substances to help mood or symptoms: no  No polydipsia, polyuria.  No temperature intolerance.  No bowel changes  Denies symptoms of bismark: grandiosity, euphoria, need for little or no  "sleep, rapid pressured speech, spending sprees, reckless or risky behavior, hypersexual behavior.   No visual or auditory hallucinations.    Labs     None.     PHYSICAL EXAM   Pulse 70   Height 1.676 m (5' 6\")   Weight 62.6 kg (138 lb)   Body Mass Index 22.27 kg/m²   General: normal speech pattern and content   Clothing and grooming normal.  Behavior: no psychomotor abnormalities or impulsivity  Eye contact: good  Affect: normal  Though content: normal insight and reasoning, no evidence of psychotic process.   Neck/Thyroid: No adenopathy, no palpable thyroid nodules.  Lungs: CTAB  Heart: RRR no ectopy  Neuro: Gait normal. Reflexes normal and symmetric. Sensation grossly intact        Abnormal findings: none    Labs     Labs are reviewed and discussed with a patient  Lab Results   Component Value Date/Time    CHOLSTRLTOT 169 06/20/2017 07:22 AM    LDL 93 06/20/2017 07:22 AM    HDL 60 06/20/2017 07:22 AM    TRIGLYCERIDE 78 06/20/2017 07:22 AM       Lab Results   Component Value Date/Time    GLUCOSE 100 (H) 06/20/2017 07:22 AM     Lab Results   Component Value Date/Time    ASTSGOT 17 08/22/2014 01:17 PM      Lab Results   Component Value Date/Time    HBA1C 5.4 12/05/2020 12:52 PM     No results found for: TSH  No results found for: FREET4    Lab Results   Component Value Date/Time    WBC 4.9 04/16/2012 09:57 AM    RBC 4.70 04/16/2012 09:57 AM    HEMOGLOBIN 14.1 04/16/2012 09:57 AM    HEMATOCRIT 41.9 04/16/2012 09:57 AM    MCV 89.2 04/16/2012 09:57 AM    MCH 29.9 04/16/2012 09:57 AM    MCHC 33.6 04/16/2012 09:57 AM    MPV 6.6 (L) 04/16/2012 09:57 AM    NEUTSPOLYS 46.9 04/16/2012 09:57 AM    LYMPHOCYTES 40.2 04/16/2012 09:57 AM    MONOCYTES 6.0 04/16/2012 09:57 AM    EOSINOPHILS 4.9 04/16/2012 09:57 AM    BASOPHILS 2.0 04/16/2012 09:57 AM      Imaging     None    ASSESMENT AND PLAN     1. Situational anxiety  Controlled, continue with current treatment.  - ALPRAZolam (XANAX) 0.5 MG Tab; Take 1 tablet by mouth as " needed for Sleep for up to 90 days.  Dispense: 30 tablet; Refill: 2  - FLUoxetine (PROZAC) 10 MG Cap; TAKE ONE CAPSULES BY MOUTH BID  Dispense: 180 capsule; Refill: 1  2. Situational depression  - ALPRAZolam (XANAX) 0.5 MG Tab; Take 1 tablet by mouth as needed for Sleep for up to 90 days.  Dispense: 30 tablet; Refill: 2  - FLUoxetine (PROZAC) 10 MG Cap; TAKE ONE CAPSULES BY MOUTH BID  Dispense: 180 capsule; Refill: 1  3. Primary insomnia  - ALPRAZolam (XANAX) 0.5 MG Tab; Take 1 tablet by mouth as needed for Sleep for up to 90 days.  Dispense: 30 tablet; Refill: 2  4. Stress  - ALPRAZolam (XANAX) 0.5 MG Tab; Take 1 tablet by mouth as needed for Sleep for up to 90 days.  Dispense: 30 tablet; Refill: 2    Obtained and reviewed patient utilization report from [facility] pharmacy database on 4/5/2021 4:08 PM  prior to writing prescription for controlled substance II, III or IV per [place] bill . Based on assessment of the report, the prescription is medically necessary.     Smoking Counseling: Nonsmoker    Follow up: in 3 months, med refill    Please note that this dictation was created using voice recognition software. Despite all efforts, some minor grammar mistakes are possible.

## 2021-05-06 DIAGNOSIS — G47.26 SHIFT WORK SLEEP DISORDER: ICD-10-CM

## 2021-05-06 RX ORDER — ZOLPIDEM TARTRATE 10 MG/1
10 TABLET ORAL NIGHTLY PRN
Qty: 30 TABLET | Refills: 1 | Status: SHIPPED | OUTPATIENT
Start: 2021-05-06 | End: 2021-07-12 | Stop reason: SDUPTHER

## 2021-05-24 ENCOUNTER — PRE-ADMISSION TESTING (OUTPATIENT)
Dept: ADMISSIONS | Facility: MEDICAL CENTER | Age: 36
End: 2021-05-24
Attending: PLASTIC SURGERY
Payer: COMMERCIAL

## 2021-05-24 DIAGNOSIS — Z01.812 PRE-OPERATIVE LABORATORY EXAMINATION: ICD-10-CM

## 2021-05-24 LAB
ANION GAP SERPL CALC-SCNC: 8 MMOL/L (ref 7–16)
BASOPHILS # BLD AUTO: 1 % (ref 0–1.8)
BASOPHILS # BLD: 0.04 K/UL (ref 0–0.12)
BUN SERPL-MCNC: 16 MG/DL (ref 8–22)
CALCIUM SERPL-MCNC: 9.1 MG/DL (ref 8.5–10.5)
CHLORIDE SERPL-SCNC: 104 MMOL/L (ref 96–112)
CO2 SERPL-SCNC: 26 MMOL/L (ref 20–33)
CREAT SERPL-MCNC: 1 MG/DL (ref 0.5–1.4)
EOSINOPHIL # BLD AUTO: 0.14 K/UL (ref 0–0.51)
EOSINOPHIL NFR BLD: 3.3 % (ref 0–6.9)
ERYTHROCYTE [DISTWIDTH] IN BLOOD BY AUTOMATED COUNT: 43.9 FL (ref 35.9–50)
GLUCOSE SERPL-MCNC: 84 MG/DL (ref 65–99)
HCT VFR BLD AUTO: 42.1 % (ref 37–47)
HGB BLD-MCNC: 13.8 G/DL (ref 12–16)
IMM GRANULOCYTES # BLD AUTO: 0.02 K/UL (ref 0–0.11)
IMM GRANULOCYTES NFR BLD AUTO: 0.5 % (ref 0–0.9)
LYMPHOCYTES # BLD AUTO: 1.8 K/UL (ref 1–4.8)
LYMPHOCYTES NFR BLD: 42.8 % (ref 22–41)
MCH RBC QN AUTO: 30.5 PG (ref 27–33)
MCHC RBC AUTO-ENTMCNC: 32.8 G/DL (ref 33.6–35)
MCV RBC AUTO: 92.9 FL (ref 81.4–97.8)
MONOCYTES # BLD AUTO: 0.33 K/UL (ref 0–0.85)
MONOCYTES NFR BLD AUTO: 7.8 % (ref 0–13.4)
NEUTROPHILS # BLD AUTO: 1.88 K/UL (ref 2–7.15)
NEUTROPHILS NFR BLD: 44.6 % (ref 44–72)
NRBC # BLD AUTO: 0 K/UL
NRBC BLD-RTO: 0 /100 WBC
PLATELET # BLD AUTO: 207 K/UL (ref 164–446)
PMV BLD AUTO: 10.4 FL (ref 9–12.9)
POTASSIUM SERPL-SCNC: 4.5 MMOL/L (ref 3.6–5.5)
RBC # BLD AUTO: 4.53 M/UL (ref 4.2–5.4)
SODIUM SERPL-SCNC: 138 MMOL/L (ref 135–145)
WBC # BLD AUTO: 4.2 K/UL (ref 4.8–10.8)

## 2021-05-24 PROCEDURE — 36415 COLL VENOUS BLD VENIPUNCTURE: CPT

## 2021-05-24 PROCEDURE — 80048 BASIC METABOLIC PNL TOTAL CA: CPT

## 2021-05-24 PROCEDURE — 85025 COMPLETE CBC W/AUTO DIFF WBC: CPT

## 2021-05-29 ENCOUNTER — APPOINTMENT (OUTPATIENT)
Dept: ADMISSIONS | Facility: MEDICAL CENTER | Age: 36
End: 2021-05-29
Attending: PLASTIC SURGERY
Payer: COMMERCIAL

## 2021-05-29 DIAGNOSIS — Z01.812 PRE-OPERATIVE LABORATORY EXAMINATION: ICD-10-CM

## 2021-05-29 LAB — COVID ORDER STATUS COVID19: NORMAL

## 2021-05-29 PROCEDURE — U0005 INFEC AGEN DETEC AMPLI PROBE: HCPCS

## 2021-05-29 PROCEDURE — U0003 INFECTIOUS AGENT DETECTION BY NUCLEIC ACID (DNA OR RNA); SEVERE ACUTE RESPIRATORY SYNDROME CORONAVIRUS 2 (SARS-COV-2) (CORONAVIRUS DISEASE [COVID-19]), AMPLIFIED PROBE TECHNIQUE, MAKING USE OF HIGH THROUGHPUT TECHNOLOGIES AS DESCRIBED BY CMS-2020-01-R: HCPCS

## 2021-05-29 PROCEDURE — C9803 HOPD COVID-19 SPEC COLLECT: HCPCS

## 2021-05-30 LAB
SARS-COV-2 RNA RESP QL NAA+PROBE: NOTDETECTED
SPECIMEN SOURCE: NORMAL

## 2021-06-01 ENCOUNTER — HOSPITAL ENCOUNTER (OUTPATIENT)
Facility: MEDICAL CENTER | Age: 36
End: 2021-06-01
Attending: PLASTIC SURGERY | Admitting: PLASTIC SURGERY
Payer: COMMERCIAL

## 2021-06-01 ENCOUNTER — ANESTHESIA EVENT (OUTPATIENT)
Dept: SURGERY | Facility: MEDICAL CENTER | Age: 36
End: 2021-06-01
Payer: COMMERCIAL

## 2021-06-01 ENCOUNTER — ANESTHESIA (OUTPATIENT)
Dept: SURGERY | Facility: MEDICAL CENTER | Age: 36
End: 2021-06-01
Payer: COMMERCIAL

## 2021-06-01 VITALS
HEIGHT: 66 IN | BODY MASS INDEX: 23.6 KG/M2 | DIASTOLIC BLOOD PRESSURE: 57 MMHG | RESPIRATION RATE: 16 BRPM | SYSTOLIC BLOOD PRESSURE: 111 MMHG | WEIGHT: 146.83 LBS | HEART RATE: 59 BPM | OXYGEN SATURATION: 95 % | TEMPERATURE: 97.2 F

## 2021-06-01 LAB — HCG UR QL: NEGATIVE

## 2021-06-01 PROCEDURE — 500054 HCHG BANDAGE, ELASTIC 6: Performed by: PLASTIC SURGERY

## 2021-06-01 PROCEDURE — 160009 HCHG ANES TIME/MIN: Performed by: PLASTIC SURGERY

## 2021-06-01 PROCEDURE — 160029 HCHG SURGERY MINUTES - 1ST 30 MINS LEVEL 4: Performed by: PLASTIC SURGERY

## 2021-06-01 PROCEDURE — 160041 HCHG SURGERY MINUTES - EA ADDL 1 MIN LEVEL 4: Performed by: PLASTIC SURGERY

## 2021-06-01 PROCEDURE — 700105 HCHG RX REV CODE 258: Performed by: PLASTIC SURGERY

## 2021-06-01 PROCEDURE — 700102 HCHG RX REV CODE 250 W/ 637 OVERRIDE(OP): Performed by: ANESTHESIOLOGY

## 2021-06-01 PROCEDURE — 501838 HCHG SUTURE GENERAL: Performed by: PLASTIC SURGERY

## 2021-06-01 PROCEDURE — 700111 HCHG RX REV CODE 636 W/ 250 OVERRIDE (IP): Performed by: ANESTHESIOLOGY

## 2021-06-01 PROCEDURE — 160036 HCHG PACU - EA ADDL 30 MINS PHASE I: Performed by: PLASTIC SURGERY

## 2021-06-01 PROCEDURE — 700111 HCHG RX REV CODE 636 W/ 250 OVERRIDE (IP): Performed by: PLASTIC SURGERY

## 2021-06-01 PROCEDURE — 700101 HCHG RX REV CODE 250: Performed by: PLASTIC SURGERY

## 2021-06-01 PROCEDURE — 160002 HCHG RECOVERY MINUTES (STAT): Performed by: PLASTIC SURGERY

## 2021-06-01 PROCEDURE — 500064 HCHG BINDER, 4-PANEL MED/LG: Performed by: PLASTIC SURGERY

## 2021-06-01 PROCEDURE — 160025 RECOVERY II MINUTES (STATS): Performed by: PLASTIC SURGERY

## 2021-06-01 PROCEDURE — 160046 HCHG PACU - 1ST 60 MINS PHASE II: Performed by: PLASTIC SURGERY

## 2021-06-01 PROCEDURE — A9270 NON-COVERED ITEM OR SERVICE: HCPCS | Performed by: ANESTHESIOLOGY

## 2021-06-01 PROCEDURE — 81025 URINE PREGNANCY TEST: CPT

## 2021-06-01 PROCEDURE — 160035 HCHG PACU - 1ST 60 MINS PHASE I: Performed by: PLASTIC SURGERY

## 2021-06-01 PROCEDURE — 700111 HCHG RX REV CODE 636 W/ 250 OVERRIDE (IP)

## 2021-06-01 PROCEDURE — 160048 HCHG OR STATISTICAL LEVEL 1-5: Performed by: PLASTIC SURGERY

## 2021-06-01 PROCEDURE — A9270 NON-COVERED ITEM OR SERVICE: HCPCS | Performed by: PLASTIC SURGERY

## 2021-06-01 DEVICE — IMPLANTABLE DEVICE: Type: IMPLANTABLE DEVICE | Site: BREAST | Status: FUNCTIONAL

## 2021-06-01 RX ORDER — HYDROMORPHONE HYDROCHLORIDE 1 MG/ML
0.2 INJECTION, SOLUTION INTRAMUSCULAR; INTRAVENOUS; SUBCUTANEOUS
Status: DISCONTINUED | OUTPATIENT
Start: 2021-06-01 | End: 2021-06-01 | Stop reason: HOSPADM

## 2021-06-01 RX ORDER — HYDROMORPHONE HYDROCHLORIDE 1 MG/ML
0.1 INJECTION, SOLUTION INTRAMUSCULAR; INTRAVENOUS; SUBCUTANEOUS
Status: DISCONTINUED | OUTPATIENT
Start: 2021-06-01 | End: 2021-06-01 | Stop reason: HOSPADM

## 2021-06-01 RX ORDER — MIDAZOLAM HYDROCHLORIDE 1 MG/ML
INJECTION INTRAMUSCULAR; INTRAVENOUS PRN
Status: DISCONTINUED | OUTPATIENT
Start: 2021-06-01 | End: 2021-06-01 | Stop reason: SURG

## 2021-06-01 RX ORDER — OXYCODONE HCL 5 MG/5 ML
5 SOLUTION, ORAL ORAL
Status: COMPLETED | OUTPATIENT
Start: 2021-06-01 | End: 2021-06-01

## 2021-06-01 RX ORDER — OXYCODONE HCL 5 MG/5 ML
10 SOLUTION, ORAL ORAL
Status: COMPLETED | OUTPATIENT
Start: 2021-06-01 | End: 2021-06-01

## 2021-06-01 RX ORDER — EPINEPHRINE 1 MG/ML(1)
AMPUL (ML) INJECTION
Status: DISCONTINUED
Start: 2021-06-01 | End: 2021-06-01 | Stop reason: HOSPADM

## 2021-06-01 RX ORDER — SODIUM CHLORIDE, SODIUM LACTATE, POTASSIUM CHLORIDE, CALCIUM CHLORIDE 600; 310; 30; 20 MG/100ML; MG/100ML; MG/100ML; MG/100ML
INJECTION, SOLUTION INTRAVENOUS CONTINUOUS
Status: DISCONTINUED | OUTPATIENT
Start: 2021-06-01 | End: 2021-06-01 | Stop reason: HOSPADM

## 2021-06-01 RX ORDER — MEPERIDINE HYDROCHLORIDE 25 MG/ML
12.5 INJECTION INTRAMUSCULAR; INTRAVENOUS; SUBCUTANEOUS
Status: DISCONTINUED | OUTPATIENT
Start: 2021-06-01 | End: 2021-06-01 | Stop reason: HOSPADM

## 2021-06-01 RX ORDER — SODIUM BICARBONATE 42 MG/ML
INJECTION, SOLUTION INTRAVENOUS
Status: DISCONTINUED
Start: 2021-06-01 | End: 2021-06-01 | Stop reason: HOSPADM

## 2021-06-01 RX ORDER — HYDROMORPHONE HYDROCHLORIDE 1 MG/ML
0.4 INJECTION, SOLUTION INTRAMUSCULAR; INTRAVENOUS; SUBCUTANEOUS
Status: DISCONTINUED | OUTPATIENT
Start: 2021-06-01 | End: 2021-06-01 | Stop reason: HOSPADM

## 2021-06-01 RX ORDER — CEFAZOLIN SODIUM 1 G/3ML
INJECTION, POWDER, FOR SOLUTION INTRAMUSCULAR; INTRAVENOUS PRN
Status: DISCONTINUED | OUTPATIENT
Start: 2021-06-01 | End: 2021-06-01 | Stop reason: SURG

## 2021-06-01 RX ORDER — DEXAMETHASONE SODIUM PHOSPHATE 4 MG/ML
INJECTION, SOLUTION INTRA-ARTICULAR; INTRALESIONAL; INTRAMUSCULAR; INTRAVENOUS; SOFT TISSUE PRN
Status: DISCONTINUED | OUTPATIENT
Start: 2021-06-01 | End: 2021-06-01 | Stop reason: SURG

## 2021-06-01 RX ORDER — HALOPERIDOL 5 MG/ML
1 INJECTION INTRAMUSCULAR
Status: DISCONTINUED | OUTPATIENT
Start: 2021-06-01 | End: 2021-06-01 | Stop reason: HOSPADM

## 2021-06-01 RX ORDER — MEPERIDINE HYDROCHLORIDE 25 MG/ML
INJECTION INTRAMUSCULAR; INTRAVENOUS; SUBCUTANEOUS
Status: COMPLETED
Start: 2021-06-01 | End: 2021-06-01

## 2021-06-01 RX ORDER — ONDANSETRON 2 MG/ML
INJECTION INTRAMUSCULAR; INTRAVENOUS PRN
Status: DISCONTINUED | OUTPATIENT
Start: 2021-06-01 | End: 2021-06-01 | Stop reason: SURG

## 2021-06-01 RX ORDER — DIAZEPAM 2 MG/1
2 TABLET ORAL EVERY 6 HOURS PRN
COMMUNITY
End: 2021-07-12

## 2021-06-01 RX ORDER — LIDOCAINE HYDROCHLORIDE 10 MG/ML
INJECTION, SOLUTION EPIDURAL; INFILTRATION; INTRACAUDAL; PERINEURAL
Status: DISCONTINUED
Start: 2021-06-01 | End: 2021-06-01 | Stop reason: HOSPADM

## 2021-06-01 RX ORDER — DIPHENHYDRAMINE HYDROCHLORIDE 50 MG/ML
12.5 INJECTION INTRAMUSCULAR; INTRAVENOUS
Status: DISCONTINUED | OUTPATIENT
Start: 2021-06-01 | End: 2021-06-01 | Stop reason: HOSPADM

## 2021-06-01 RX ORDER — LIDOCAINE HYDROCHLORIDE 10 MG/ML
INJECTION, SOLUTION INFILTRATION; PERINEURAL
Status: DISCONTINUED
Start: 2021-06-01 | End: 2021-06-01 | Stop reason: HOSPADM

## 2021-06-01 RX ORDER — OXYCODONE HYDROCHLORIDE AND ACETAMINOPHEN 5; 325 MG/1; MG/1
1 TABLET ORAL EVERY 4 HOURS PRN
COMMUNITY
End: 2021-07-12

## 2021-06-01 RX ORDER — ONDANSETRON 2 MG/ML
4 INJECTION INTRAMUSCULAR; INTRAVENOUS
Status: DISCONTINUED | OUTPATIENT
Start: 2021-06-01 | End: 2021-06-01 | Stop reason: HOSPADM

## 2021-06-01 RX ADMIN — ONDANSETRON 4 MG: 2 INJECTION INTRAMUSCULAR; INTRAVENOUS at 14:50

## 2021-06-01 RX ADMIN — FENTANYL CITRATE 50 MCG: 50 INJECTION, SOLUTION INTRAMUSCULAR; INTRAVENOUS at 16:43

## 2021-06-01 RX ADMIN — MEPERIDINE HYDROCHLORIDE 12.5 MG: 25 INJECTION INTRAMUSCULAR; INTRAVENOUS; SUBCUTANEOUS at 16:25

## 2021-06-01 RX ADMIN — MEPERIDINE HYDROCHLORIDE 12.5 MG: 25 INJECTION INTRAMUSCULAR; INTRAVENOUS; SUBCUTANEOUS at 16:34

## 2021-06-01 RX ADMIN — DEXAMETHASONE SODIUM PHOSPHATE 8 MG: 4 INJECTION, SOLUTION INTRA-ARTICULAR; INTRALESIONAL; INTRAMUSCULAR; INTRAVENOUS; SOFT TISSUE at 14:50

## 2021-06-01 RX ADMIN — POVIDONE IODINE 15 ML: 100 SOLUTION TOPICAL at 13:03

## 2021-06-01 RX ADMIN — OXYCODONE HYDROCHLORIDE 10 MG: 5 SOLUTION ORAL at 16:42

## 2021-06-01 RX ADMIN — CEFAZOLIN 2 G: 330 INJECTION, POWDER, FOR SOLUTION INTRAMUSCULAR; INTRAVENOUS at 14:45

## 2021-06-01 RX ADMIN — MIDAZOLAM HYDROCHLORIDE 2 MG: 1 INJECTION, SOLUTION INTRAMUSCULAR; INTRAVENOUS at 14:42

## 2021-06-01 RX ADMIN — FENTANYL CITRATE 100 MCG: 50 INJECTION, SOLUTION INTRAMUSCULAR; INTRAVENOUS at 14:42

## 2021-06-01 RX ADMIN — PROPOFOL 200 MG: 10 INJECTION, EMULSION INTRAVENOUS at 14:43

## 2021-06-01 RX ADMIN — FENTANYL CITRATE 50 MCG: 50 INJECTION, SOLUTION INTRAMUSCULAR; INTRAVENOUS at 18:02

## 2021-06-01 RX ADMIN — FENTANYL CITRATE 150 MCG: 50 INJECTION, SOLUTION INTRAMUSCULAR; INTRAVENOUS at 15:09

## 2021-06-01 RX ADMIN — SODIUM CHLORIDE, POTASSIUM CHLORIDE, SODIUM LACTATE AND CALCIUM CHLORIDE: 600; 310; 30; 20 INJECTION, SOLUTION INTRAVENOUS at 13:03

## 2021-06-01 RX ADMIN — FENTANYL CITRATE 50 MCG: 50 INJECTION, SOLUTION INTRAMUSCULAR; INTRAVENOUS at 17:31

## 2021-06-01 ASSESSMENT — PAIN DESCRIPTION - PAIN TYPE
TYPE: SURGICAL PAIN

## 2021-06-01 ASSESSMENT — PAIN SCALES - GENERAL: PAIN_LEVEL: 2

## 2021-06-01 NOTE — ANESTHESIA TIME REPORT
Anesthesia Start and Stop Event Times     Date Time Event    6/1/2021 1419 Ready for Procedure     1436 Anesthesia Start     1616 Anesthesia Stop        Responsible Staff  06/01/21    Name Role Begin End    Chaka Madrid M.D. Anesth 1436 1616        Preop Diagnosis (Free Text):  Pre-op Diagnosis     MICROMASTIA        Preop Diagnosis (Codes):    Post op Diagnosis  Hypomastia      Premium Reason  A. 3PM - 7AM    Comments:

## 2021-06-01 NOTE — ANESTHESIA PREPROCEDURE EVALUATION
Relevant Problems   PULMONARY   (positive) Exercise-induced asthma       Physical Exam    Airway   Mallampati: II  TM distance: >3 FB  Neck ROM: full       Cardiovascular   Rhythm: regular     Dental - normal exam           Pulmonary    Abdominal - normal exam     Neurological              Anesthesia Plan    ASA 1       Plan - general       Airway plan will be LMA        Plan Factors:   Patient did not smoke on day of procedure.      Induction: intravenous          Informed Consent:    Anesthetic plan and risks discussed with patient.    Use of blood products discussed with: patient whom.

## 2021-06-01 NOTE — ANESTHESIA PROCEDURE NOTES
Airway    Date/Time: 6/1/2021 2:43 PM  Performed by: Chaka Madrid M.D.  Authorized by: Chaka Madrid M.D.     Location:  OR  Urgency:  Elective  Difficult Airway: No    Indications for Airway Management:  Anesthesia      Spontaneous Ventilation: present    Sedation Level:  Deep  Preoxygenated: Yes    Patient Position:  Sniffing  MILS Maintained Throughout: No    Mask Difficulty Assessment:  0 - not attempted  Final Airway Type:  Supraglottic airway  Final Supraglottic Airway:  Standard LMA    SGA Size:  4  Number of Attempts at Approach:  1

## 2021-06-01 NOTE — OR SURGEON
Immediate Post OP Note    PreOp Diagnosis: enlarged areola, encounter for breast implant pocket chance and implant exchange      PostOp Diagnosis: same      Procedure(s):  REMOVAL, IMPLANT, BREAST - Wound Class: Clean  INSERTION, IMPLANT, BREAST - EXCHANGE IMPLANTS TO SUB-MUSCULAR POCKET AND AREOLA REDUCTION - Wound Class: Clean  LIPOSUCTION - Wound Class: Clean    Surgeon(s):  Ousmane Augustine M.D.    Anesthesiologist/Type of Anesthesia:  Anesthesiologist: Chaka Madrid M.D./General    Surgical Staff:  Circulator: Abigail Bonner R.N.  Scrub Person: Sadi Leos    Specimens removed if any:  * No specimens in log *    Estimated Blood Loss: 20cc    Findings: as above    Complications: none        6/1/2021 4:30 PM Ousmane Augustine M.D.

## 2021-06-01 NOTE — ANESTHESIA POSTPROCEDURE EVALUATION
Patient: Nickie Jett    Procedure Summary     Date: 06/01/21 Room / Location: [facility] ROOM 25 / SURGERY SAME DAY [facility]    Anesthesia Start: 1436 Anesthesia Stop: 1616    Procedures:       REMOVAL, IMPLANT, BREAST (Bilateral Breast)      INSERTION, IMPLANT, BREAST - EXCHANGE IMPLANTS TO SUB-MUSCULAR POCKET AND AREOLA REDUCTION (Bilateral Breast)      LIPOSUCTION (Abdomen) Diagnosis: (MICROMASTIA)    Surgeons: Ousmane Augustine M.D. Responsible Provider: Chaka Madrid M.D.    Anesthesia Type: general ASA Status: 1          Final Anesthesia Type: general  Last vitals  BP   Blood Pressure: 116/67    Temp   36.7 °C (98.1 °F)    Pulse   60   Resp   16    SpO2   98 %      Anesthesia Post Evaluation    Patient location during evaluation: PACU  Patient participation: complete - patient participated  Level of consciousness: awake  Pain score: 2    Airway patency: patent  Anesthetic complications: no  Cardiovascular status: adequate  Respiratory status: acceptable  Hydration status: acceptable    PONV: none          No complications documented.     Nurse Pain Score: 0 (NPRS)

## 2021-06-01 NOTE — DISCHARGE INSTRUCTIONS
ACTIVITY: Rest and take it easy for the first 24 hours.  A responsible adult is recommended to remain with you during that time.  It is normal to feel sleepy.  We encourage you to not do anything that requires balance, judgment or coordination.    MILD FLU-LIKE SYMPTOMS ARE NORMAL. YOU MAY EXPERIENCE GENERALIZED MUSCLE ACHES, THROAT IRRITATION, HEADACHE AND/OR SOME NAUSEA.    FOR 24 HOURS DO NOT:  Drive, operate machinery or run household appliances.  Drink beer or alcoholic beverages.   Make important decisions or sign legal documents.    SPECIAL INSTRUCTIONS: Leave dressings on clean and dry until follow-up appointment, do not get wet.     DIET: To avoid nausea, slowly advance diet as tolerated, avoiding spicy or greasy foods for the first day.  Add more substantial food to your diet according to your physician's instructions. INCREASE FLUIDS AND FIBER TO AVOID CONSTIPATION.    FOLLOW-UP APPOINTMENT:  A follow-up appointment should be arranged with Dr. Augustine [phone]; call to schedule.    You should CALL YOUR PHYSICIAN if you develop:  Fever greater than 101 degrees F.  Pain not relieved by medication, or persistent nausea or vomiting.  Excessive bleeding (blood soaking through dressing) or unexpected drainage from the wound.  Extreme redness or swelling around the incision site, drainage of pus or foul smelling drainage.  Inability to urinate or empty your bladder within 8 hours.  Problems with breathing or chest pain.    You should call [phone] if you develop problems with breathing or chest pain.  If you are unable to contact your doctor or surgical center, you should go to the nearest emergency room or urgent care center.    Dr. Augustine's telephone #: [phone]    If any questions arise, call your doctor.  If your doctor is not available, please feel free to call the Surgical Center at [phone]. The Contact Center is open Monday through Friday 7AM to 5PM and may speak to a nurse at [phone], or  toll free at [phone].     A registered nurse may call you a few days after your surgery to see how you are doing after your procedure.    MEDICATIONS: Resume taking daily medication.  Take prescribed pain medication with food.  If no medication is prescribed, you may take non-aspirin pain medication if needed.  PAIN MEDICATION CAN BE VERY CONSTIPATING.  Take a stool softener or laxative such as senokot, pericolace, or milk of magnesia if needed.    Last pain medication given: Oxycodone at 4:40pm.  Ok to take percocet after 8:40pm.    If your physician has prescribed pain medication that includes Acetaminophen (Tylenol), do not take additional Acetaminophen (Tylenol) while taking the prescribed medication.    Depression / Suicide Risk    As you are discharged from this [facility] facility, it is important to learn how to keep safe from harming yourself.    Recognize the warning signs:  · Abrupt changes in personality, positive or negative- including increase in energy   · Giving away possessions  · Change in eating patterns- significant weight changes-  positive or negative  · Change in sleeping patterns- unable to sleep or sleeping all the time   · Unwillingness or inability to communicate  · Depression  · Unusual sadness, discouragement and loneliness  · Talk of wanting to die  · Neglect of personal appearance   · Rebelliousness- reckless behavior  · Withdrawal from people/activities they love  · Confusion- inability to concentrate     If you or a loved one observes any of these behaviors or has concerns about self-harm, here's what you can do:  · Talk about it- your feelings and reasons for harming yourself  · Remove any means that you might use to hurt yourself (examples: pills, rope, extension cords, firearm)  · Get professional help from the community (Mental Health, Substance Abuse, psychological counseling)  · Do not be alone:Call your Safe Contact- someone whom you trust who will be there for  you.  · Call your local CRISIS HOTLINE 861-4244 or 719-685-6305  · Call your local Children's Mobile Crisis Response Team [place] (648) 993-4896 or www.gdgt  · Call the toll free National Suicide Prevention Hotlines   · National Suicide Prevention Lifeline 600-784-IFDZ (5600)  · National [employer] Line Network 800-SUICIDE (952-9531)

## 2021-06-02 NOTE — OR NURSING
1615 - Pt to PACU 9 from OR.  Bedside report from anesthesiologist and RN.  Attached to monitoring, VSS, breathing is calm and unlabored.  Dressings inspected, no signs of overt bleeding noted.  4L mask titrated to room air.  Pt with shivering.  Order from Dr. Madrid received to give demerol PRN.      1815 - Pt stable to discharge. Pt able to ambulate, void and get dressed in bathroom without assistance. Instructions given, patient and SO verbalize understanding.  IV And armbands removed.  Taken via wheelchair with RN escort to car.  Pt has all belongings with them.

## 2021-06-08 NOTE — OP REPORT
Arterial Line Placement Procedure Note    Diagnosis:  Hypotension    Consent:  Informed consent unable to be obtained due to emergent need for procedure    Procedure:  Timeout performed, Verified patient identification, Verified procedure, Verified site/side, Verified correct patient position, Special equiptment available and Medications, allergies, relevant history reviewed.  Arterial catheter inserted into the right radial artery for hemodynamic monitoring indication.  Sterile procedures employed- hand hygiene prior to donning gloves, gown, mask, barrier kit and full body drape.  Local anesthetic administered., Seldinger technique utilized. and 20 gauge arterial catheter advanced after 3rd puncture.  Arterial line secured in place with sutures, stat lock, tegaderm and tape.   Blood flow good, BP tracing good, No complications and 2 lateral artery punctures were unable to advance the guide wire. On third attempt easy advancement with good pulsatile flow return.    Ultrasound:  Portable ultrasound was utilized during this procedure    Shine Alfaro PA-C  Critical Care         PREOPERATIVE DIAGNOSIS:    1) Encounter for implant exchange and areola reduction    POSTOPERATIVE DIAGNOSIS:  Same    PROCEDURE PERFORMED:   1. Bilateral breast implant pocket change and areola reduction    SURGEON:    Ousmane Augustine MD    ANESTHESIA:  General    COMPLICATIONS:  None    BRIEF HISTORY:     This is a healthy female who desires breast augmentation revision and areola reduction as delineated above.  She is healthy and desire this elective surgery.  I discussed with her the proposed procedures, options and incisions as well as any possible complications including not be able to guarantee an exact bra size or shape , excessive bleeding, loss of skin, excess scarring, no improvement, asymmetry, need for further surgery, damage to blood vessels and nerves, deep venous thrombosis, pulmonary embolism and even death.   She understands and wishes to proceed.   I have again discuss the procedure and risks with the patient preop and marked her accordingly as well.     PROCEDURE:   After placing the patient supine on the operating table and adequately obtaining general endotracheal anesthesia, the patient was prepped and draped in the appropriate sterile fashion.  All pressure points were adequately buttressed and cushioned and calf pneumatics were placed preop prior to anesthesia.    Next attention was directed to the chest wall and breasts.  Subsequently using a # 15 Bard Bladimir scalpel through the inframammary fold, a 4 cm. IMF incision as marked with the patient preop .  This was made to the pectoralis major fascia and the subglandular plane was meticulously  entered above the pectoralis major  muscle.    The previous implants were removed and found to not be ruptured or compromised.  The pocket dissection was then carried medial, superior and laterally in the subpectoral to accommodate the proper implant size and shape.  This was all done under direct visualization to maximize precision of dissection and  hemostasis with electrocautery .   The patient was appropriately sized with Smooth Round high Profile, 385cc silicone gel implants.  This was done bilaterally in a meticulous manner.   Symmetry was ascertained by sitting the patient up on the operating table.  Meticulous hemostasis was ascertained utilizing pin point electrocautery again prior to final implant placement bilaterally .  The wounds were copiously irrigated with triple antibiotic solution after obtaining meticulous hemostasis and absolutely clear saline irrigation in each pocket prior to implant placement using a new talc free gloves.    Next the permanent Smooth Round high Profile, 385cc silicone gel implants were placed into the left and right breasts with talc free gloves.  After placing the permanent implants, she was brought to a sitting position to again ascertain bilateral breast symmetry. The wounds were closed in discrete  layers with 3-0 and 4-0 monocryl deep. Next a 38mm cookie cutter was used to reduce her areola. A 15 blade was used to develop this incision and the areola reduction was closed using 3-0 and 4-0 monocryl. This was followed by closure of the dermis  with monocryl intradermal and steristrips/mastisol. She was then placed in a surgical compression wrap.      The patient tolerated the procedure well.   There were no intraoperative complications noted.  Warming blankets and calf pneumatics  which were placed before induction and kept in place for the entire operative procedure .   Sponge and needle counts were correct x 2.  She was extubated and taken to the recovery room with vital signs stable.

## 2021-07-12 ENCOUNTER — OFFICE VISIT (OUTPATIENT)
Dept: MEDICAL GROUP | Age: 36
End: 2021-07-12
Payer: COMMERCIAL

## 2021-07-12 VITALS
WEIGHT: 141 LBS | HEART RATE: 52 BPM | HEIGHT: 66 IN | SYSTOLIC BLOOD PRESSURE: 82 MMHG | OXYGEN SATURATION: 97 % | TEMPERATURE: 97.7 F | DIASTOLIC BLOOD PRESSURE: 58 MMHG | BODY MASS INDEX: 22.66 KG/M2

## 2021-07-12 DIAGNOSIS — F43.9 STRESS: ICD-10-CM

## 2021-07-12 DIAGNOSIS — F43.21 SITUATIONAL DEPRESSION: ICD-10-CM

## 2021-07-12 DIAGNOSIS — F41.8 SITUATIONAL ANXIETY: ICD-10-CM

## 2021-07-12 DIAGNOSIS — F51.01 PRIMARY INSOMNIA: ICD-10-CM

## 2021-07-12 PROCEDURE — 99214 OFFICE O/P EST MOD 30 MIN: CPT | Performed by: INTERNAL MEDICINE

## 2021-07-12 RX ORDER — ZOLPIDEM TARTRATE 10 MG/1
10 TABLET ORAL NIGHTLY PRN
Qty: 30 TABLET | Refills: 2 | Status: SHIPPED | OUTPATIENT
Start: 2021-07-12 | End: 2021-10-10

## 2021-07-12 RX ORDER — ALPRAZOLAM 0.5 MG/1
0.5 TABLET ORAL PRN
Qty: 30 TABLET | Refills: 2 | Status: SHIPPED | OUTPATIENT
Start: 2021-07-12 | End: 2021-10-10

## 2021-07-12 RX ORDER — FLUOXETINE 10 MG/1
10 CAPSULE ORAL DAILY
Qty: 90 CAPSULE | Refills: 1 | Status: SHIPPED | OUTPATIENT
Start: 2021-07-12 | End: 2021-10-11 | Stop reason: SDUPTHER

## 2021-07-12 NOTE — PROGRESS NOTES
CC: Nickie Jett is a 35 y.o. 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  Previous treatment:   xanax  Current treatment:  fluoxetine 10 mg per day, xanax prn, ambien prn     Risk factors:   ·           Depression, anxiety  ·           H/o phobia: no  ·           H/o panic attacks: no  ·           H/o hypomanic or manic episode: no  ·           Substance abuse  (alcohol,  prescription drugs caffeine, tobacco): no  ·           Family support: yes  ·           Living alone:  no  ·           Family history of psych disorders: yes  ·           Stress: no  ·           PMH of abuse (sexual, physical, emotional abuse; neglect): no     Current Outpatient Medications   Medication Sig Dispense Refill   • Cephalexin (KEFLEX PO) Take 500 mg by mouth 4 times a day.     • oxyCODONE-acetaminophen (PERCOCET) 5-325 MG Tab Take 1 tablet by mouth every four hours as needed.     • diazePAM (VALIUM) 2 MG Tab Take 2 mg by mouth every 6 hours as needed for Anxiety.     • Magnesium Hydroxide (DULCOLAX PO) Take  by mouth.     • zolpidem (AMBIEN) 10 MG Tab Take 1 tablet by mouth at bedtime as needed for Sleep for up to 90 days. 30 tablet 1   • albuterol 108 (90 Base) MCG/ACT Aero Soln inhalation aerosol Inhale 2 Puffs by mouth every 6 hours as needed. 8.5 g 3   • EPINEPHrine (EPIPEN) 0.3 MG/0.3ML Solution Auto-injector solution for injection Inject by intramuscular route once as needed for anaphylaxis.       No current facility-administered medications for this visit.     She  has a past  "medical history of Anesthesia, ASTHMA, Bowel habit changes, Cholesteatoma, and Insomnia.  She  has a past surgical history that includes pr anesth,ear surgery (2000); mammoplasty augmentation (4/18/2012); pr breast augmentation with implant; breast biopsy (Right, 8/4/2017); breast implant removal (Bilateral, 6/1/2021); breast implant revision (Bilateral, 6/1/2021); and liposuction (6/1/2021).  Social History     Tobacco Use   • Smoking status: Never Smoker   • Smokeless tobacco: Never Used   Vaping Use   • Vaping Use: Never used   Substance Use Topics   • Alcohol use: Yes     Alcohol/week: 0.6 oz     Types: 1 Standard drinks or equivalent per week     Comment: 6-7 per week   • Drug use: Yes     Types: Marijuana     Comment: mj edibles     Social History     Social History Narrative    Lives with .     Children: 1    Work: [employer] RN     Family History   Problem Relation Age of Onset   • Allergies Paternal Grandfather         asthma   • Diabetes Paternal Grandmother    • Hypertension Maternal Grandmother    • Cancer Neg Hx    • Heart Disease Neg Hx    • Hyperlipidemia Neg Hx    • Stroke Neg Hx    • Thyroid Neg Hx      Family Status   Relation Name Status   • PGFa  (Not Specified)   • PGMo  (Not Specified)   • MGMo  (Not Specified)   • Neg Hx  (Not Specified)     ROS   Taking supplements to help mood or symptoms: yes  Using alcohol or other substances to help mood or symptoms: no  No polydipsia, polyuria.  No temperature intolerance.  No bowel changes  Denies symptoms of bismark: grandiosity, euphoria, need for little or no sleep, rapid pressured speech, spending sprees, reckless or risky behavior, hypersexual behavior.   No visual or auditory hallucinations.    Labs     None.     PHYSICAL EXAM   Blood Pressure (Abnormal) 82/58 (BP Location: Left arm, Patient Position: Sitting, BP Cuff Size: Adult)   Pulse (Abnormal) 52   Temperature 36.5 °C (97.7 °F) (Temporal)   Height 1.676 m (5' 6\")   Weight 64 kg (141 lb) "   Oxygen Saturation 97%   Body Mass Index 22.76 kg/m²   General: normal speech pattern and content   Clothing and grooming normal.  Behavior: no psychomotor abnormalities or impulsivity  Eye contact: good  Affect: normal  Though content: normal insight and reasoning, no evidence of psychotic process.   Neck/Thyroid: No adenopathy, no palpable thyroid nodules.  Lungs: CTAB  Heart: RRR no ectopy  Neuro: Gait normal. Reflexes normal and symmetric. Sensation grossly intact        Abnormal findings: none    Labs     Labs are reviewed and discussed with a patient  Lab Results   Component Value Date/Time    CHOLSTRLTOT 169 06/20/2017 07:22 AM    LDL 93 06/20/2017 07:22 AM    HDL 60 06/20/2017 07:22 AM    TRIGLYCERIDE 78 06/20/2017 07:22 AM       Lab Results   Component Value Date/Time    SODIUM 138 05/24/2021 01:30 PM    POTASSIUM 4.5 05/24/2021 01:30 PM    CHLORIDE 104 05/24/2021 01:30 PM    CO2 26 05/24/2021 01:30 PM    GLUCOSE 84 05/24/2021 01:30 PM    BUN 16 05/24/2021 01:30 PM    CREATININE 1.00 05/24/2021 01:30 PM     Lab Results   Component Value Date/Time    ASTSGOT 17 08/22/2014 01:17 PM      Lab Results   Component Value Date/Time    HBA1C 5.4 12/05/2020 12:52 PM     No results found for: TSH  No results found for: FREET4    Lab Results   Component Value Date/Time    WBC 4.2 (L) 05/24/2021 01:30 PM    RBC 4.53 05/24/2021 01:30 PM    HEMOGLOBIN 13.8 05/24/2021 01:30 PM    HEMATOCRIT 42.1 05/24/2021 01:30 PM    MCV 92.9 05/24/2021 01:30 PM    MCH 30.5 05/24/2021 01:30 PM    MCHC 32.8 (L) 05/24/2021 01:30 PM    MPV 10.4 05/24/2021 01:30 PM    NEUTSPOLYS 44.60 05/24/2021 01:30 PM    LYMPHOCYTES 42.80 (H) 05/24/2021 01:30 PM    MONOCYTES 7.80 05/24/2021 01:30 PM    EOSINOPHILS 3.30 05/24/2021 01:30 PM    BASOPHILS 1.00 05/24/2021 01:30 PM      Imaging     None    ASSESMENT AND PLAN     1. Situational anxiety  - ALPRAZolam (XANAX) 0.5 MG Tab; Take 1 tablet by mouth as needed for Sleep for up to 90 days.  Dispense: 30  tablet; Refill: 2  - FLUoxetine (PROZAC) 10 MG Cap; Take 1 capsule by mouth every day.  Dispense: 90 capsule; Refill: 1  2. Situational depression  - ALPRAZolam (XANAX) 0.5 MG Tab; Take 1 tablet by mouth as needed for Sleep for up to 90 days.  Dispense: 30 tablet; Refill: 2  - FLUoxetine (PROZAC) 10 MG Cap; Take 1 capsule by mouth every day.  Dispense: 90 capsule; Refill: 1  3. Primary insomnia  - zolpidem (AMBIEN) 10 MG Tab; Take 1 tablet by mouth at bedtime as needed for Sleep for up to 90 days.  Dispense: 30 tablet; Refill: 2  - ALPRAZolam (XANAX) 0.5 MG Tab; Take 1 tablet by mouth as needed for Sleep for up to 90 days.  Dispense: 30 tablet; Refill: 2  - FLUoxetine (PROZAC) 10 MG Cap; Take 1 capsule by mouth every day.  Dispense: 90 capsule; Refill: 1  4. Stress  - ALPRAZolam (XANAX) 0.5 MG Tab; Take 1 tablet by mouth as needed for Sleep for up to 90 days.  Dispense: 30 tablet; Refill: 2  - FLUoxetine (PROZAC) 10 MG Cap; Take 1 capsule by mouth every day.  Dispense: 90 capsule; Refill: 1    Obtained and reviewed patient utilization report from [facility] pharmacy database on 7/12/2021 12:33 PM  prior to writing prescription for controlled substance II, III or IV per [place] bill . Based on assessment of the report, the prescription is medically necessary.     Smoking Counseling: Nonsmoker    Follow up: in 3 months, med refill    Please note that this dictation was created using voice recognition software. Despite all efforts, some minor grammar mistakes are possible.

## 2021-09-22 ENCOUNTER — IMMUNIZATION (OUTPATIENT)
Dept: OCCUPATIONAL MEDICINE | Facility: CLINIC | Age: 36
End: 2021-09-22

## 2021-09-22 DIAGNOSIS — Z23 NEED FOR VACCINATION: Primary | ICD-10-CM

## 2021-09-28 PROCEDURE — 90686 IIV4 VACC NO PRSV 0.5 ML IM: CPT | Performed by: PREVENTIVE MEDICINE

## 2021-10-11 ENCOUNTER — TELEMEDICINE (OUTPATIENT)
Dept: MEDICAL GROUP | Age: 36
End: 2021-10-11
Payer: COMMERCIAL

## 2021-10-11 VITALS — HEIGHT: 66 IN | BODY MASS INDEX: 21.69 KG/M2 | HEART RATE: 58 BPM | WEIGHT: 135 LBS

## 2021-10-11 DIAGNOSIS — F51.01 PRIMARY INSOMNIA: ICD-10-CM

## 2021-10-11 DIAGNOSIS — F43.9 STRESS: ICD-10-CM

## 2021-10-11 DIAGNOSIS — F41.8 SITUATIONAL ANXIETY: ICD-10-CM

## 2021-10-11 DIAGNOSIS — F43.21 SITUATIONAL DEPRESSION: ICD-10-CM

## 2021-10-11 DIAGNOSIS — Z00.00 HEALTH CARE MAINTENANCE: ICD-10-CM

## 2021-10-11 PROCEDURE — 99214 OFFICE O/P EST MOD 30 MIN: CPT | Mod: 95 | Performed by: INTERNAL MEDICINE

## 2021-10-11 RX ORDER — ALPRAZOLAM 0.5 MG/1
0.5 TABLET ORAL NIGHTLY PRN
COMMUNITY
End: 2021-10-11 | Stop reason: SDUPTHER

## 2021-10-11 RX ORDER — FLUOXETINE 10 MG/1
10 CAPSULE ORAL DAILY
Qty: 90 CAPSULE | Refills: 1 | Status: SHIPPED | OUTPATIENT
Start: 2021-10-11 | End: 2021-11-17 | Stop reason: SDUPTHER

## 2021-10-11 RX ORDER — ALPRAZOLAM 0.5 MG/1
0.5 TABLET ORAL NIGHTLY PRN
Qty: 30 TABLET | Refills: 2 | Status: SHIPPED | OUTPATIENT
Start: 2021-10-11 | End: 2021-10-24

## 2021-10-11 RX ORDER — ZOLPIDEM TARTRATE 10 MG/1
10 TABLET ORAL NIGHTLY PRN
COMMUNITY
End: 2021-10-11 | Stop reason: SDUPTHER

## 2021-10-11 RX ORDER — ZOLPIDEM TARTRATE 10 MG/1
10 TABLET ORAL NIGHTLY PRN
Qty: 30 TABLET | Refills: 2 | Status: SHIPPED | OUTPATIENT
Start: 2021-10-11 | End: 2022-01-09

## 2021-10-11 NOTE — PROGRESS NOTES
Telemedicine Visit: Established Patient     This Remote Face to Face encounter was conducted via Zoom. Given the importance of social distancing and other strategies recommended to reduce the risk of COVID-19 transmission, I am providing medical care to this patient via audio/video visit in place of an in person visit at the request of the patient. Verbal consent to telehealth, risks, benefits, and consequences were discussed. Patient retains the right to withdraw at any time. All existing confidentiality protections apply. The patient has access to all transmitted medical information. No dissemination of any patient images or information to other entities without further written consent.    CHIEF COMPLAINT     Nickie Jett is a 35 y.o. 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  Previous treatment:   xanax  Current treatment:  fluoxetine 10 mg per day, xanax prn, ambien prn     Risk factors:   ·           Depression, anxiety  ·           H/o phobia: no  ·           H/o panic attacks: no  ·           H/o hypomanic or manic episode: no  ·           Substance abuse  (alcohol,  prescription drugs caffeine, tobacco): no  ·           Family support: yes  ·           Living alone:  no  ·           Family history of psych disorders: yes  ·           Stress: no  ·           PMH of abuse (sexual, physical, emotional abuse; neglect): no     Current Outpatient Medications   Medication Sig Dispense Refill   • zolpidem (AMBIEN) 10 MG Tab Take 1 Tablet by mouth at bedtime as needed for Sleep for up to 90 days. 30 Tablet 2   •  ALPRAZolam (XANAX) 0.5 MG Tab Take 1 Tablet by mouth at bedtime as needed for Sleep for up to 90 days. 30 Tablet 2   • FLUoxetine (PROZAC) 10 MG Cap Take 1 capsule by mouth every day. 90 capsule 1   • Magnesium Hydroxide (DULCOLAX PO) Take  by mouth.     • albuterol 108 (90 Base) MCG/ACT Aero Soln inhalation aerosol Inhale 2 Puffs by mouth every 6 hours as needed. 8.5 g 3   • EPINEPHrine (EPIPEN) 0.3 MG/0.3ML Solution Auto-injector solution for injection Inject by intramuscular route once as needed for anaphylaxis.       No current facility-administered medications for this visit.     She  has a past medical history of Anesthesia, ASTHMA, Bowel habit changes, Cholesteatoma, and Insomnia.  She  has a past surgical history that includes pr anesth,ear surgery (2000); mammoplasty augmentation (4/18/2012); pr breast augmentation with implant; breast biopsy (Right, 8/4/2017); breast implant removal (Bilateral, 6/1/2021); breast implant revision (Bilateral, 6/1/2021); and liposuction (6/1/2021).  Social History     Tobacco Use   • Smoking status: Never Smoker   • Smokeless tobacco: Never Used   Vaping Use   • Vaping Use: Never used   Substance Use Topics   • Alcohol use: Yes     Alcohol/week: 2.4 oz     Types: 4 Standard drinks or equivalent per week   • Drug use: Not Currently     Types: Marijuana     Comment: mj edibles     Social History     Social History Narrative    Lives with .     Children: 1    Work: [employer]     Family History   Problem Relation Age of Onset   • Allergies Paternal Grandfather         asthma   • Diabetes Paternal Grandmother    • Hypertension Maternal Grandmother    • Cancer Neg Hx    • Heart Disease Neg Hx    • Hyperlipidemia Neg Hx    • Stroke Neg Hx    • Thyroid Neg Hx      Family Status   Relation Name Status   • PGFa  (Not Specified)   • PGMo  (Not Specified)   • MGMo  (Not Specified)   • Neg Hx  (Not Specified)       ROS   Taking supplements to help mood or symptoms: yes  Using  "alcohol or other substances to help mood or symptoms: no  No polydipsia, polyuria.  No temperature intolerance.  No bowel changes  Denies symptoms of bismark: grandiosity, euphoria, need for little or no sleep, rapid pressured speech, spending sprees, reckless or risky behavior, hypersexual behavior.   No visual or auditory hallucinations.    Labs     None.     PHYSICAL EXAM   Pulse (Abnormal) 58   Height 1.676 m (5' 6\")   Weight 61.2 kg (135 lb)   General: normal speech pattern and content   Clothing and grooming normal.  Behavior: no psychomotor abnormalities or impulsivity  Eye contact: good  Affect: normal  Though content: normal insight and reasoning, no evidence of psychotic process.   Neck/Thyroid: No adenopathy, no palpable thyroid nodules.  Lungs: CTAB  Heart: RRR no ectopy  Neuro: Gait normal. Reflexes normal and symmetric. Sensation grossly intact        Abnormal findings: none      Labs     Labs are reviewed and discussed with a patient  Lab Results   Component Value Date/Time    CHOLSTRLTOT 169 06/20/2017 07:22 AM    LDL 93 06/20/2017 07:22 AM    HDL 60 06/20/2017 07:22 AM    TRIGLYCERIDE 78 06/20/2017 07:22 AM       Lab Results   Component Value Date/Time    SODIUM 138 05/24/2021 01:30 PM    POTASSIUM 4.5 05/24/2021 01:30 PM    CHLORIDE 104 05/24/2021 01:30 PM    CO2 26 05/24/2021 01:30 PM    GLUCOSE 84 05/24/2021 01:30 PM    BUN 16 05/24/2021 01:30 PM    CREATININE 1.00 05/24/2021 01:30 PM     Lab Results   Component Value Date/Time    ASTSGOT 17 08/22/2014 01:17 PM      Lab Results   Component Value Date/Time    HBA1C 5.4 12/05/2020 12:52 PM     No results found for: TSH  No results found for: FREET4    Lab Results   Component Value Date/Time    WBC 4.2 (L) 05/24/2021 01:30 PM    RBC 4.53 05/24/2021 01:30 PM    HEMOGLOBIN 13.8 05/24/2021 01:30 PM    HEMATOCRIT 42.1 05/24/2021 01:30 PM    MCV 92.9 05/24/2021 01:30 PM    MCH 30.5 05/24/2021 01:30 PM    MCHC 32.8 (L) 05/24/2021 01:30 PM    MPV 10.4 " 05/24/2021 01:30 PM    NEUTSPOLYS 44.60 05/24/2021 01:30 PM    LYMPHOCYTES 42.80 (H) 05/24/2021 01:30 PM    MONOCYTES 7.80 05/24/2021 01:30 PM    EOSINOPHILS 3.30 05/24/2021 01:30 PM    BASOPHILS 1.00 05/24/2021 01:30 PM      Imaging     None    ASSESMENT AND PLAN     1. Situational anxiety  - Controlled, continue with current management.  - ALPRAZolam (XANAX) 0.5 MG Tab; Take 1 Tablet by mouth at bedtime as needed for Sleep for up to 90 days.  Dispense: 30 Tablet; Refill: 2  - FLUoxetine (PROZAC) 10 MG Cap; Take 1 Capsule by mouth every day.  Dispense: 90 Capsule; Refill: 1  2. Situational depression  - ALPRAZolam (XANAX) 0.5 MG Tab; Take 1 Tablet by mouth at bedtime as needed for Sleep for up to 90 days.  Dispense: 30 Tablet; Refill: 2  - FLUoxetine (PROZAC) 10 MG Cap; Take 1 Capsule by mouth every day.  Dispense: 90 Capsule; Refill: 1  3. Primary insomnia  - zolpidem (AMBIEN) 10 MG Tab; Take 1 Tablet by mouth at bedtime as needed for Sleep for up to 90 days.  Dispense: 30 Tablet; Refill: 2  - ALPRAZolam (XANAX) 0.5 MG Tab; Take 1 Tablet by mouth at bedtime as needed for Sleep for up to 90 days.  Dispense: 30 Tablet; Refill: 2  - FLUoxetine (PROZAC) 10 MG Cap; Take 1 Capsule by mouth every day.  Dispense: 90 Capsule; Refill: 1  4. Stress  - ALPRAZolam (XANAX) 0.5 MG Tab; Take 1 Tablet by mouth at bedtime as needed for Sleep for up to 90 days.  Dispense: 30 Tablet; Refill: 2  - FLUoxetine (PROZAC) 10 MG Cap; Take 1 Capsule by mouth every day.  Dispense: 90 Capsule; Refill: 1    Obtained and reviewed patient utilization report from [facility] [place] [facility] pharmacy database on 10/11/2021 8:55 AM  prior to writing prescription for controlled substance II, III or IV per [place] bill . Based on assessment of the report, the prescription is medically necessary.     5. Health care maintenance  UTD    Smoking Counseling: Nonsmoker    Follow up: in 3 months and prn    Please note that this dictation was created using voice  recognition software. Despite all efforts, some minor grammar mistakes are possible.

## 2021-10-23 DIAGNOSIS — F51.01 PRIMARY INSOMNIA: ICD-10-CM

## 2021-10-23 DIAGNOSIS — F41.8 SITUATIONAL ANXIETY: ICD-10-CM

## 2021-10-23 DIAGNOSIS — F43.9 STRESS: ICD-10-CM

## 2021-10-23 DIAGNOSIS — F43.21 SITUATIONAL DEPRESSION: ICD-10-CM

## 2021-10-24 RX ORDER — ALPRAZOLAM 0.5 MG/1
TABLET ORAL
Qty: 30 TABLET | Refills: 2 | Status: SHIPPED | OUTPATIENT
Start: 2021-10-24 | End: 2022-01-31 | Stop reason: SDUPTHER

## 2021-11-17 ENCOUNTER — PATIENT MESSAGE (OUTPATIENT)
Dept: MEDICAL GROUP | Age: 36
End: 2021-11-17

## 2021-11-17 DIAGNOSIS — F41.8 SITUATIONAL ANXIETY: ICD-10-CM

## 2021-11-17 DIAGNOSIS — F51.01 PRIMARY INSOMNIA: ICD-10-CM

## 2021-11-17 DIAGNOSIS — F43.9 STRESS: ICD-10-CM

## 2021-11-17 DIAGNOSIS — F43.21 SITUATIONAL DEPRESSION: ICD-10-CM

## 2021-11-17 RX ORDER — FLUOXETINE HYDROCHLORIDE 20 MG/1
20 CAPSULE ORAL DAILY
Qty: 90 CAPSULE | Refills: 1 | Status: SHIPPED | OUTPATIENT
Start: 2021-11-17 | End: 2022-01-31 | Stop reason: SDUPTHER

## 2021-11-17 NOTE — PATIENT COMMUNICATION
Received request via: Patient    Was the patient seen in the last year in this department? No     Does the patient have an active prescription (recently filled or refills available) for medication(s) requested? NEEDS SENT TO DIFFERENT PHARAMCY AND [facility]

## 2021-11-17 NOTE — TELEPHONE ENCOUNTER
From: Nickie Jett  To: Physician Marci Villegas  Sent: 11/17/2021 1:09 PM PST  Subject: Fluoxetine     Hello,    Can I get a refill for fluoxetine? Also, my pharmacy said that it needs to be sent into [facility] mail [facility]. I am currently taking 20mg daily, my last script was for 10mg daily.     Thanks

## 2021-11-19 ENCOUNTER — EH NON-PROVIDER (OUTPATIENT)
Dept: OCCUPATIONAL MEDICINE | Facility: CLINIC | Age: 36
End: 2021-11-19

## 2021-11-19 ENCOUNTER — EMPLOYEE HEALTH (OUTPATIENT)
Dept: OCCUPATIONAL MEDICINE | Facility: CLINIC | Age: 36
End: 2021-11-19

## 2021-11-19 ENCOUNTER — HOSPITAL ENCOUNTER (OUTPATIENT)
Facility: MEDICAL CENTER | Age: 36
End: 2021-11-19
Attending: NURSE PRACTITIONER
Payer: COMMERCIAL

## 2021-11-19 DIAGNOSIS — Z02.1 PRE-EMPLOYMENT HEALTH SCREENING EXAMINATION: ICD-10-CM

## 2021-11-19 DIAGNOSIS — Z02.1 PRE-EMPLOYMENT DRUG SCREENING: ICD-10-CM

## 2021-11-19 DIAGNOSIS — Z02.89 ENCOUNTER FOR OCCUPATIONAL HEALTH ASSESSMENT: ICD-10-CM

## 2021-11-19 LAB
AMP AMPHETAMINE: NORMAL
BAR BARBITURATES: NORMAL
BZO BENZODIAZEPINES: NORMAL
COC COCAINE: NORMAL
INT CON NEG: NORMAL
INT CON POS: NORMAL
MDMA ECSTASY: NORMAL
MET METHAMPHETAMINES: NORMAL
MTD METHADONE: NORMAL
OPI OPIATES: NORMAL
OXY OXYCODONE: NORMAL
PCP PHENCYCLIDINE: NORMAL
POC URINE DRUG SCREEN OCDRS: NORMAL
THC: NORMAL

## 2021-11-19 PROCEDURE — 86480 TB TEST CELL IMMUN MEASURE: CPT | Performed by: NURSE PRACTITIONER

## 2021-11-19 PROCEDURE — 8915 PR COMPREHENSIVE PHYSICAL: Performed by: NURSE PRACTITIONER

## 2021-11-19 PROCEDURE — 94375 RESPIRATORY FLOW VOLUME LOOP: CPT | Performed by: NURSE PRACTITIONER

## 2021-11-19 PROCEDURE — 80305 DRUG TEST PRSMV DIR OPT OBS: CPT | Performed by: NURSE PRACTITIONER

## 2021-11-22 LAB
GAMMA INTERFERON BACKGROUND BLD IA-ACNC: 0.02 IU/ML
M TB IFN-G BLD-IMP: NEGATIVE
M TB IFN-G CD4+ BCKGRND COR BLD-ACNC: 0.01 IU/ML
MITOGEN IGNF BCKGRD COR BLD-ACNC: >10 IU/ML
QFT TB2 - NIL TBQ2: 0.01 IU/ML

## 2022-01-10 ENCOUNTER — EH NON-PROVIDER (OUTPATIENT)
Dept: OCCUPATIONAL MEDICINE | Facility: CLINIC | Age: 37
End: 2022-01-10

## 2022-01-10 DIAGNOSIS — Z71.85 IMMUNIZATION COUNSELING: ICD-10-CM

## 2022-01-31 ENCOUNTER — TELEMEDICINE (OUTPATIENT)
Dept: MEDICAL GROUP | Age: 37
End: 2022-01-31
Payer: COMMERCIAL

## 2022-01-31 VITALS — TEMPERATURE: 97.3 F | WEIGHT: 135 LBS | BODY MASS INDEX: 21.69 KG/M2 | HEIGHT: 66 IN

## 2022-01-31 DIAGNOSIS — F51.01 PRIMARY INSOMNIA: ICD-10-CM

## 2022-01-31 DIAGNOSIS — F43.9 STRESS: ICD-10-CM

## 2022-01-31 DIAGNOSIS — F43.21 SITUATIONAL DEPRESSION: ICD-10-CM

## 2022-01-31 DIAGNOSIS — F41.8 SITUATIONAL ANXIETY: ICD-10-CM

## 2022-01-31 PROCEDURE — 99213 OFFICE O/P EST LOW 20 MIN: CPT | Mod: 95 | Performed by: INTERNAL MEDICINE

## 2022-01-31 RX ORDER — FLUOXETINE HYDROCHLORIDE 20 MG/1
20 CAPSULE ORAL DAILY
Qty: 90 CAPSULE | Refills: 1 | Status: SHIPPED | OUTPATIENT
Start: 2022-01-31 | End: 2022-05-03 | Stop reason: SDUPTHER

## 2022-01-31 RX ORDER — ALPRAZOLAM 0.5 MG/1
0.5 TABLET ORAL
Qty: 30 TABLET | Refills: 2 | Status: SHIPPED | OUTPATIENT
Start: 2022-01-31 | End: 2022-05-03 | Stop reason: SDUPTHER

## 2022-01-31 ASSESSMENT — PATIENT HEALTH QUESTIONNAIRE - PHQ9: CLINICAL INTERPRETATION OF PHQ2 SCORE: 0

## 2022-01-31 NOTE — PROGRESS NOTES
Telemedicine Visit: Established Patient     This Remote Face to Face encounter was conducted via Zoom. Given the importance of social distancing and other strategies recommended to reduce the risk of COVID-19 transmission, I am providing medical care to this patient via audio/video visit in place of an in person visit at the request of the patient. Verbal consent to telehealth, risks, benefits, and consequences were discussed. Patient retains the right to withdraw at any time. All existing confidentiality protections apply. The patient has access to all transmitted medical information. No dissemination of any patient images or information to other entities without further written consent.    CHIEF COMPLAINT      Nickie Jett is a 36 y.o. female who presents for follow up of the following     Situational anxiety, depression, insomnia, stress  Interval  course  - stable and controlled with current treatment   - needs xanax and fluoxetine refill     Onset:  adolescense  Course: worse in the last few weeks                - longterm boyfriend left              - epidemics Covid              - pending final exams at school  Mood/anxiety currently does not affect: daily activities/sleep.  Previous treatment:   xanax  Current treatment:  fluoxetine 20 mg per day, xanax prn     Risk factors:   ·           Depression, anxiety  ·           H/o phobia: no  ·           H/o panic attacks: no  ·           H/o hypomanic or manic episode: no  ·           Substance abuse  (alcohol,  prescription drugs caffeine, tobacco): no  ·           Family support: yes  ·           Living alone:  no  ·           Family history of psych disorders: yes  ·           Stress: no  ·           PMH of abuse (sexual, physical, emotional abuse; neglect): no     Current Outpatient Medications   Medication Sig Dispense Refill   • FLUoxetine (PROZAC) 20 MG Cap Take 1 Capsule by mouth every day. 90 Capsule 1   • ALPRAZolam (XANAX) 0.5 MG Tab Take 1 Tablet  by mouth at bedtime as needed for Sleep for up to 90 days. 30 Tablet 2   • albuterol 108 (90 Base) MCG/ACT Aero Soln inhalation aerosol Inhale 2 Puffs by mouth every 6 hours as needed. 8.5 g 3   • EPINEPHrine (EPIPEN) 0.3 MG/0.3ML Solution Auto-injector solution for injection Inject by intramuscular route once as needed for anaphylaxis.       No current facility-administered medications for this visit.     She  has a past medical history of Anesthesia, ASTHMA, Bowel habit changes, Cholesteatoma, and Insomnia.  She  has a past surgical history that includes pr anesth,ear surgery (2000); mammoplasty augmentation (4/18/2012); pr breast augmentation with implant; breast biopsy (Right, 8/4/2017); breast implant removal (Bilateral, 6/1/2021); breast implant revision (Bilateral, 6/1/2021); and liposuction (6/1/2021).  Social History     Tobacco Use   • Smoking status: Never Smoker   • Smokeless tobacco: Never Used   Vaping Use   • Vaping Use: Never used   Substance Use Topics   • Alcohol use: Yes     Alcohol/week: 2.4 oz     Types: 4 Standard drinks or equivalent per week   • Drug use: Not Currently     Types: Marijuana     Comment: mj edibles     Social History     Social History Narrative    Lives with .     Children: 1    Work: [employer]     Family History   Problem Relation Age of Onset   • Allergies Paternal Grandfather         asthma   • Diabetes Paternal Grandmother    • Hypertension Maternal Grandmother    • Cancer Neg Hx    • Heart Disease Neg Hx    • Hyperlipidemia Neg Hx    • Stroke Neg Hx    • Thyroid Neg Hx      Family Status   Relation Name Status   • PGFa  (Not Specified)   • PGMo  (Not Specified)   • MGMo  (Not Specified)   • Neg Hx  (Not Specified)       ROS   Taking supplements to help mood or symptoms: yes  Using alcohol or other substances to help mood or symptoms: no  No polydipsia, polyuria.  No temperature intolerance.  No bowel changes  Denies symptoms of bismark: grandiosity, euphoria, need  "for little or no sleep, rapid pressured speech, spending sprees, reckless or risky behavior, hypersexual behavior.   No visual or auditory hallucinations.    Labs     None.     PHYSICAL EXAM   Vitals obtained by patient  Temperature 36.3 °C (97.3 °F)   Height 1.676 m (5' 6\")   Weight 61.2 kg (135 lb) Comment: pt stated  Body Mass Index 21.79 kg/m²   General: normal speech pattern and content   Clothing and grooming normal.  Behavior: no psychomotor abnormalities or impulsivity  Eye contact: good  Affect: normal  Though content: normal insight and reasoning, no evidence of psychotic process.   Neck/Thyroid: No adenopathy, no palpable thyroid nodules.  Lungs: CTAB  Heart: RRR no ectopy  Neuro: Gait normal. Reflexes normal and symmetric. Sensation grossly intact        Abnormal findings: none    Labs     Labs are reviewed and discussed with a patient  Lab Results   Component Value Date/Time    CHOLSTRLTOT 169 06/20/2017 07:22 AM    LDL 93 06/20/2017 07:22 AM    HDL 60 06/20/2017 07:22 AM    TRIGLYCERIDE 78 06/20/2017 07:22 AM       Lab Results   Component Value Date/Time    SODIUM 138 05/24/2021 01:30 PM    POTASSIUM 4.5 05/24/2021 01:30 PM    CHLORIDE 104 05/24/2021 01:30 PM    CO2 26 05/24/2021 01:30 PM    GLUCOSE 84 05/24/2021 01:30 PM    BUN 16 05/24/2021 01:30 PM    CREATININE 1.00 05/24/2021 01:30 PM     Lab Results   Component Value Date/Time    ASTSGOT 17 08/22/2014 01:17 PM      Lab Results   Component Value Date/Time    HBA1C 5.4 12/05/2020 12:52 PM     No results found for: TSH  No results found for: FREET4    Lab Results   Component Value Date/Time    WBC 4.2 (L) 05/24/2021 01:30 PM    RBC 4.53 05/24/2021 01:30 PM    HEMOGLOBIN 13.8 05/24/2021 01:30 PM    HEMATOCRIT 42.1 05/24/2021 01:30 PM    MCV 92.9 05/24/2021 01:30 PM    MCH 30.5 05/24/2021 01:30 PM    MCHC 32.8 (L) 05/24/2021 01:30 PM    MPV 10.4 05/24/2021 01:30 PM    NEUTSPOLYS 44.60 05/24/2021 01:30 PM    LYMPHOCYTES 42.80 (H) 05/24/2021 01:30 PM "    MONOCYTES 7.80 05/24/2021 01:30 PM    EOSINOPHILS 3.30 05/24/2021 01:30 PM    BASOPHILS 1.00 05/24/2021 01:30 PM      Imaging     None    ASSESMENT AND PLAN     1. Situational anxiety  - Controlled, continue with current management.  - FLUoxetine (PROZAC) 20 MG Cap; Take 1 Capsule by mouth every day.  Dispense: 90 Capsule; Refill: 1  - ALPRAZolam (XANAX) 0.5 MG Tab; Take 1 Tablet by mouth at bedtime as needed for Sleep for up to 90 days.  Dispense: 30 Tablet; Refill: 2  2. Situational depression  - FLUoxetine (PROZAC) 20 MG Cap; Take 1 Capsule by mouth every day.  Dispense: 90 Capsule; Refill: 1  - ALPRAZolam (XANAX) 0.5 MG Tab; Take 1 Tablet by mouth at bedtime as needed for Sleep for up to 90 days.  Dispense: 30 Tablet; Refill: 2  3. Primary insomnia  - FLUoxetine (PROZAC) 20 MG Cap; Take 1 Capsule by mouth every day.  Dispense: 90 Capsule; Refill: 1  - ALPRAZolam (XANAX) 0.5 MG Tab; Take 1 Tablet by mouth at bedtime as needed for Sleep for up to 90 days.  Dispense: 30 Tablet; Refill: 2  4. Stress  - FLUoxetine (PROZAC) 20 MG Cap; Take 1 Capsule by mouth every day.  Dispense: 90 Capsule; Refill: 1  - ALPRAZolam (XANAX) 0.5 MG Tab; Take 1 Tablet by mouth at bedtime as needed for Sleep for up to 90 days.  Dispense: 30 Tablet; Refill: 2    Obtained and reviewed patient utilization report from [facility] database on 1/31/2022 2:03 PM  prior to writing prescription for controlled substance II, III or IV per [place] bill . Based on assessment of the report, the prescription is medically necessary.     Smoking Counseling: Nonsmoker    Follow up: in 3 months and prn    Please note that this dictation was created using voice recognition software. Despite all efforts, some minor grammar mistakes are possible.

## 2022-03-11 DIAGNOSIS — H71.91 CHOLESTEATOMA OF RIGHT EAR: ICD-10-CM

## 2022-05-03 ENCOUNTER — TELEMEDICINE (OUTPATIENT)
Dept: MEDICAL GROUP | Age: 37
End: 2022-05-03
Payer: COMMERCIAL

## 2022-05-03 VITALS — TEMPERATURE: 97.3 F | HEIGHT: 66 IN | BODY MASS INDEX: 24.91 KG/M2 | WEIGHT: 155 LBS

## 2022-05-03 DIAGNOSIS — F41.8 SITUATIONAL ANXIETY: ICD-10-CM

## 2022-05-03 DIAGNOSIS — F43.21 SITUATIONAL DEPRESSION: ICD-10-CM

## 2022-05-03 DIAGNOSIS — F43.9 STRESS: ICD-10-CM

## 2022-05-03 DIAGNOSIS — F51.01 PRIMARY INSOMNIA: ICD-10-CM

## 2022-05-03 PROCEDURE — 99214 OFFICE O/P EST MOD 30 MIN: CPT | Mod: 95 | Performed by: INTERNAL MEDICINE

## 2022-05-03 RX ORDER — ALPRAZOLAM 0.5 MG/1
0.5 TABLET ORAL NIGHTLY PRN
COMMUNITY
End: 2022-05-03

## 2022-05-03 RX ORDER — FLUOXETINE HYDROCHLORIDE 20 MG/1
20 CAPSULE ORAL DAILY
Qty: 90 CAPSULE | Refills: 1 | Status: SHIPPED | OUTPATIENT
Start: 2022-05-03 | End: 2022-07-28 | Stop reason: SDUPTHER

## 2022-05-03 RX ORDER — ALPRAZOLAM 0.5 MG/1
0.5 TABLET ORAL
Qty: 30 TABLET | Refills: 2 | Status: SHIPPED | OUTPATIENT
Start: 2022-05-03 | End: 2022-07-28 | Stop reason: SDUPTHER

## 2022-05-03 NOTE — PROGRESS NOTES
Telemedicine Visit: Established Patient     This Remote Face to Face encounter was conducted via Zoom. Given the importance of social distancing and other strategies recommended to reduce the risk of COVID-19 transmission, I am providing medical care to this patient via audio/video visit in place of an in person visit at the request of the patient. Verbal consent to telehealth, risks, benefits, and consequences were discussed. Patient retains the right to withdraw at any time. All existing confidentiality protections apply. The patient has access to all transmitted medical information. No dissemination of any patient images or information to other entities without further written consent.    CHIEF COMPLAINT     Nickie Jett is a 36 y.o. female who presents for follow up of the following     Situational anxiety, depression, insomnia, stress  Interval  course  - stable and controlled with current treatment   - needs xanax and fluoxetine refill     Onset:  adolescense  Course: worse in the last few weeks                - longterm boyfriend left              - epidemics Covid              - pending final exams at school  Mood/anxiety currently does not affect: daily activities/sleep.  Previous treatment:   xanax  Current treatment:  fluoxetine 20 mg per day, xanax prn     Risk factors:   ·           Depression, anxiety  ·           H/o phobia: no  ·           H/o panic attacks: no  ·           H/o hypomanic or manic episode: no  ·           Substance abuse  (alcohol,  prescription drugs caffeine, tobacco): no  ·           Family support: yes  ·           Living alone:  no  ·           Family history of psych disorders: yes  ·           Stress: no  ·           PMH of abuse (sexual, physical, emotional abuse; neglect): no   Depression Screen (PHQ-2/PHQ-9) 9/30/2020 4/5/2021 1/31/2022   PHQ-2 Total Score 0 0 0   PHQ-9 Total Score - - -     Current Outpatient Medications   Medication Sig Dispense Refill   • NON  SPECIFIED Apply 1 pump to face nightly     • FLUoxetine (PROZAC) 20 MG Cap Take 1 Capsule by mouth every day. 90 Capsule 1   • ALPRAZolam (XANAX) 0.5 MG Tab Take 1 Tablet by mouth at bedtime as needed for Sleep for up to 90 days. 30 Tablet 2   • Non Formulary Request Apply 1 pump to face nightly     • albuterol 108 (90 Base) MCG/ACT Aero Soln inhalation aerosol Inhale 2 Puffs by mouth every 6 hours as needed. 8.5 g 3   • EPINEPHrine (EPIPEN) 0.3 MG/0.3ML Solution Auto-injector solution for injection Inject by intramuscular route once as needed for anaphylaxis.       No current facility-administered medications for this visit.     She  has a past medical history of Anesthesia, ASTHMA, Bowel habit changes, Cholesteatoma, and Insomnia.  She  has a past surgical history that includes pr anesth,ear surgery (2000); mammoplasty augmentation (4/18/2012); pr breast augmentation with implant; breast biopsy (Right, 8/4/2017); breast implant removal (Bilateral, 6/1/2021); breast implant revision (Bilateral, 6/1/2021); and liposuction (6/1/2021).  Social History     Tobacco Use   • Smoking status: Never Smoker   • Smokeless tobacco: Never Used   Vaping Use   • Vaping Use: Never used   Substance Use Topics   • Alcohol use: Yes     Alcohol/week: 2.4 oz     Types: 4 Standard drinks or equivalent per week   • Drug use: Not Currently     Types: Marijuana     Comment: mj edibles     Social History     Social History Narrative    Lives with .     Children: 1    Work: [employer]     Family History   Problem Relation Age of Onset   • Allergies Paternal Grandfather         asthma   • Diabetes Paternal Grandmother    • Hypertension Maternal Grandmother    • Cancer Neg Hx    • Heart Disease Neg Hx    • Hyperlipidemia Neg Hx    • Stroke Neg Hx    • Thyroid Neg Hx      Family Status   Relation Name Status   • PGFa  (Not Specified)   • PGMo  (Not Specified)   • MGMo  (Not Specified)   • Neg Hx  (Not Specified)     ROS   Taking  "supplements to help mood or symptoms: yes  Using alcohol or other substances to help mood or symptoms: no  No polydipsia, polyuria.  No temperature intolerance.  No bowel changes  Denies symptoms of bismark: grandiosity, euphoria, need for little or no sleep, rapid pressured speech, spending sprees, reckless or risky behavior, hypersexual behavior.   No visual or auditory hallucinations.    Labs     None.     PHYSICAL EXAM   VS obtained by patient  Temperature 36.3 °C (97.3 °F)   Height 1.676 m (5' 6\")   Weight 70.3 kg (155 lb)   General: normal speech pattern and content   Clothing and grooming normal.  Behavior: no psychomotor abnormalities or impulsivity  Eye contact: good  Affect: normal  Though content: normal insight and reasoning, no evidence of psychotic process.   Neck/Thyroid: No adenopathy, no palpable thyroid nodules.  Lungs: CTAB  Heart: RRR no ectopy  Neuro: Gait normal. Reflexes normal and symmetric. Sensation grossly intact        Abnormal findings: none    Labs     Labs are reviewed and discussed with a patient  Lab Results   Component Value Date/Time    CHOLSTRLTOT 169 06/20/2017 07:22 AM    LDL 93 06/20/2017 07:22 AM    HDL 60 06/20/2017 07:22 AM    TRIGLYCERIDE 78 06/20/2017 07:22 AM       Lab Results   Component Value Date/Time    SODIUM 138 05/24/2021 01:30 PM    POTASSIUM 4.5 05/24/2021 01:30 PM    CHLORIDE 104 05/24/2021 01:30 PM    CO2 26 05/24/2021 01:30 PM    GLUCOSE 84 05/24/2021 01:30 PM    BUN 16 05/24/2021 01:30 PM    CREATININE 1.00 05/24/2021 01:30 PM     Lab Results   Component Value Date/Time    ASTSGOT 17 08/22/2014 01:17 PM      Lab Results   Component Value Date/Time    HBA1C 5.4 12/05/2020 12:52 PM     No results found for: TSH  No results found for: FREET4    Lab Results   Component Value Date/Time    WBC 4.2 (L) 05/24/2021 01:30 PM    RBC 4.53 05/24/2021 01:30 PM    HEMOGLOBIN 13.8 05/24/2021 01:30 PM    HEMATOCRIT 42.1 05/24/2021 01:30 PM    MCV 92.9 05/24/2021 01:30 PM    " MCH 30.5 05/24/2021 01:30 PM    MCHC 32.8 (L) 05/24/2021 01:30 PM    MPV 10.4 05/24/2021 01:30 PM    NEUTSPOLYS 44.60 05/24/2021 01:30 PM    LYMPHOCYTES 42.80 (H) 05/24/2021 01:30 PM    MONOCYTES 7.80 05/24/2021 01:30 PM    EOSINOPHILS 3.30 05/24/2021 01:30 PM    BASOPHILS 1.00 05/24/2021 01:30 PM      Imaging     None    ASSESMENT AND PLAN     1. Situational anxiety  - Controlled, continue with current management.  - FLUoxetine (PROZAC) 20 MG Cap; Take 1 Capsule by mouth every day.  Dispense: 90 Capsule; Refill: 1  - ALPRAZolam (XANAX) 0.5 MG Tab; Take 1 Tablet by mouth at bedtime as needed for Sleep for up to 90 days.  Dispense: 30 Tablet; Refill: 2  2. Situational depression  - FLUoxetine (PROZAC) 20 MG Cap; Take 1 Capsule by mouth every day.  Dispense: 90 Capsule; Refill: 1  - ALPRAZolam (XANAX) 0.5 MG Tab; Take 1 Tablet by mouth at bedtime as needed for Sleep for up to 90 days.  Dispense: 30 Tablet; Refill: 2  3. Primary insomnia  - FLUoxetine (PROZAC) 20 MG Cap; Take 1 Capsule by mouth every day.  Dispense: 90 Capsule; Refill: 1  - ALPRAZolam (XANAX) 0.5 MG Tab; Take 1 Tablet by mouth at bedtime as needed for Sleep for up to 90 days.  Dispense: 30 Tablet; Refill: 2  4. Stress  - FLUoxetine (PROZAC) 20 MG Cap; Take 1 Capsule by mouth every day.  Dispense: 90 Capsule; Refill: 1  - ALPRAZolam (XANAX) 0.5 MG Tab; Take 1 Tablet by mouth at bedtime as needed for Sleep for up to 90 days.  Dispense: 30 Tablet; Refill: 2    Obtained and reviewed patient utilization report from [facility] pharmacy database on 5/3/2022 3:37 PM  prior to writing prescription for controlled substance II, III or IV per [place] bill . Based on assessment of the report, the prescription is medically necessary.     - Reviewed effects and side effects of medication, the patient verbalized understanding     25 minutes face to face with 15 spent counseling and coordinating care. Reviewed the  pathophysiology, etiology, risks and principles  of treatment.    Smoking Counseling: Nonsmoker    Follow up: prn    Please note that this dictation was created using voice recognition software. Despite all efforts, some minor grammar mistakes are possible.

## 2022-07-28 ENCOUNTER — TELEMEDICINE (OUTPATIENT)
Dept: MEDICAL GROUP | Age: 37
End: 2022-07-28
Payer: COMMERCIAL

## 2022-07-28 VITALS — TEMPERATURE: 97.3 F | RESPIRATION RATE: 16 BRPM | BODY MASS INDEX: 24.43 KG/M2 | WEIGHT: 152 LBS | HEIGHT: 66 IN

## 2022-07-28 DIAGNOSIS — F41.8 SITUATIONAL ANXIETY: ICD-10-CM

## 2022-07-28 DIAGNOSIS — F43.21 SITUATIONAL DEPRESSION: ICD-10-CM

## 2022-07-28 DIAGNOSIS — F43.9 STRESS: ICD-10-CM

## 2022-07-28 DIAGNOSIS — F51.01 PRIMARY INSOMNIA: ICD-10-CM

## 2022-07-28 PROCEDURE — 99214 OFFICE O/P EST MOD 30 MIN: CPT | Mod: 95 | Performed by: INTERNAL MEDICINE

## 2022-07-28 RX ORDER — EPINEPHRINE 0.3 MG/.3ML
INJECTION SUBCUTANEOUS
Qty: 1 EACH | Refills: 0 | Status: SHIPPED | OUTPATIENT
Start: 2022-07-28

## 2022-07-28 RX ORDER — FLUOXETINE HYDROCHLORIDE 20 MG/1
20 CAPSULE ORAL DAILY
Qty: 90 CAPSULE | Refills: 1 | Status: SHIPPED | OUTPATIENT
Start: 2022-07-28 | End: 2023-06-13 | Stop reason: SDUPTHER

## 2022-07-28 RX ORDER — ALPRAZOLAM 1 MG/1
1 TABLET ORAL NIGHTLY PRN
Qty: 30 TABLET | Refills: 2 | Status: SHIPPED | OUTPATIENT
Start: 2022-07-28 | End: 2022-10-26

## 2022-07-28 RX ORDER — ALPRAZOLAM 0.5 MG/1
0.5 TABLET ORAL
Qty: 30 TABLET | Refills: 2 | Status: SHIPPED
Start: 2022-07-28 | End: 2022-07-28

## 2022-07-28 RX ORDER — ALBUTEROL SULFATE 90 UG/1
2 AEROSOL, METERED RESPIRATORY (INHALATION) EVERY 6 HOURS PRN
Qty: 8.5 G | Refills: 3 | Status: SHIPPED | OUTPATIENT
Start: 2022-07-28 | End: 2023-09-12 | Stop reason: SDUPTHER

## 2022-07-28 NOTE — PROGRESS NOTES
Telemedicine Visit: Established Patient     This Remote Face to Face encounter was conducted via Zoom. Given the importance of social distancing and other strategies recommended to reduce the risk of COVID-19 transmission, I am providing medical care to this patient via audio/video visit in place of an in person visit at the request of the patient. Verbal consent to telehealth, risks, benefits, and consequences were discussed. Patient retains the right to withdraw at any time. All existing confidentiality protections apply. The patient has access to all transmitted medical information. No dissemination of any patient images or information to other entities without further written consent.    CHIEF COMPLAINT     Nickie Jett is a 36 y.o. female who presents for follow up of the following     Situational anxiety, depression, insomnia, stress  Interval  course  - stable and controlled with current treatment   - needs xanax and fluoxetine refill     Onset:  adolescense  Course: worse in the last few weeks                - longterm boyfriend left              - epidemics Covid              - pending final exams at school  Mood/anxiety currently does not affect: daily activities/sleep.  Previous treatment:   xanax  Current treatment:  fluoxetine 20 mg per day, xanax prn     Risk factors:   ·           Depression, anxiety  ·           H/o phobia: no  ·           H/o panic attacks: no  ·           H/o hypomanic or manic episode: no  ·           Substance abuse  (alcohol,  prescription drugs caffeine, tobacco): no  ·           Family support: yes  ·           Living alone:  no  ·           Family history of psych disorders: yes  ·           Stress: no  ·           PMH of abuse (sexual, physical, emotional abuse; neglect): no     Current Outpatient Medications   Medication Sig Dispense Refill   • FLUoxetine (PROZAC) 20 MG Cap Take 1 Capsule by mouth every day. 90 Capsule 1   • EPINEPHrine (EPIPEN) 0.3 MG/0.3ML Solution  Auto-injector solution for injection Inject by intramuscular route once as needed for anaphylaxis. 1 Each 0   • albuterol 108 (90 Base) MCG/ACT Aero Soln inhalation aerosol Inhale 2 Puffs every 6 hours as needed for Shortness of Breath. 8.5 g 3   • ALPRAZolam (XANAX) 1 MG Tab Take 1 Tablet by mouth at bedtime as needed for Sleep for up to 90 days. 30 Tablet 2   • NON SPECIFIED Apply 1 pump to face nightly       No current facility-administered medications for this visit.     She  has a past medical history of Anesthesia, ASTHMA, Bowel habit changes, Cholesteatoma, and Insomnia.  She  has a past surgical history that includes pr anesth,ear surgery (2000); mammoplasty augmentation (4/18/2012); pr breast augmentation with implant; breast biopsy (Right, 8/4/2017); breast implant removal (Bilateral, 6/1/2021); breast implant revision (Bilateral, 6/1/2021); and liposuction (6/1/2021).  Social History     Tobacco Use   • Smoking status: Never Smoker   • Smokeless tobacco: Never Used   Vaping Use   • Vaping Use: Never used   Substance Use Topics   • Alcohol use: Yes     Alcohol/week: 2.4 oz     Types: 4 Standard drinks or equivalent per week   • Drug use: Not Currently     Types: Marijuana     Comment: mj edibles     Social History     Social History Narrative    Lives with .     Children: 1    Work: [employer]     Family History   Problem Relation Age of Onset   • Allergies Paternal Grandfather         asthma   • Diabetes Paternal Grandmother    • Hypertension Maternal Grandmother    • Cancer Neg Hx    • Heart Disease Neg Hx    • Hyperlipidemia Neg Hx    • Stroke Neg Hx    • Thyroid Neg Hx      Family Status   Relation Name Status   • PGFa  (Not Specified)   • PGMo  (Not Specified)   • MGMo  (Not Specified)   • Neg Hx  (Not Specified)     ROS   Taking supplements to help mood or symptoms: yes  Using alcohol or other substances to help mood or symptoms: no  No polydipsia, polyuria.  No temperature intolerance.  No  "bowel changes  Denies symptoms of bismark: grandiosity, euphoria, need for little or no sleep, rapid pressured speech, spending sprees, reckless or risky behavior, hypersexual behavior.   No visual or auditory hallucinations.    Labs     None.     PHYSICAL EXAM   Temperature 36.3 °C (97.3 °F)   Respiration 16   Height 1.676 m (5' 6\")   Weight 68.9 kg (152 lb)   General: normal speech pattern and content   Clothing and grooming normal.  Behavior: no psychomotor abnormalities or impulsivity  Eye contact: good  Affect: normal  Though content: normal insight and reasoning, no evidence of psychotic process.   Neck/Thyroid: No adenopathy, no palpable thyroid nodules.  Lungs: CTAB  Heart: RRR no ectopy  Neuro: Gait normal. Reflexes normal and symmetric. Sensation grossly intact        Abnormal findings: none    Labs     Labs are reviewed   Lab Results   Component Value Date/Time    CHOLSTRLTOT 169 06/20/2017 07:22 AM    LDL 93 06/20/2017 07:22 AM    HDL 60 06/20/2017 07:22 AM    TRIGLYCERIDE 78 06/20/2017 07:22 AM       Lab Results   Component Value Date/Time    SODIUM 138 05/24/2021 01:30 PM    POTASSIUM 4.5 05/24/2021 01:30 PM    CHLORIDE 104 05/24/2021 01:30 PM    CO2 26 05/24/2021 01:30 PM    GLUCOSE 84 05/24/2021 01:30 PM    BUN 16 05/24/2021 01:30 PM    CREATININE 1.00 05/24/2021 01:30 PM     Lab Results   Component Value Date/Time    ASTSGOT 17 08/22/2014 01:17 PM      Lab Results   Component Value Date/Time    HBA1C 5.4 12/05/2020 12:52 PM     Lab Results   Component Value Date/Time    WBC 4.2 (L) 05/24/2021 01:30 PM    RBC 4.53 05/24/2021 01:30 PM    HEMOGLOBIN 13.8 05/24/2021 01:30 PM    HEMATOCRIT 42.1 05/24/2021 01:30 PM    MCV 92.9 05/24/2021 01:30 PM    MCH 30.5 05/24/2021 01:30 PM    MCHC 32.8 (L) 05/24/2021 01:30 PM    MPV 10.4 05/24/2021 01:30 PM    NEUTSPOLYS 44.60 05/24/2021 01:30 PM    LYMPHOCYTES 42.80 (H) 05/24/2021 01:30 PM    MONOCYTES 7.80 05/24/2021 01:30 PM    EOSINOPHILS 3.30 05/24/2021 01:30 " PM    BASOPHILS 1.00 05/24/2021 01:30 PM     Imaging     None    ASSESMENT AND PLAN     1. Situational anxiety  - Controlled, continue with current management.  - FLUoxetine (PROZAC) 20 MG Cap; Take 1 Capsule by mouth every day.  Dispense: 90 Capsule; Refill: 1  - ALPRAZolam (XANAX) 1 MG Tab; Take 1 Tablet by mouth at bedtime as needed for Sleep for up to 90 days.  Dispense: 30 Tablet; Refill: 2  2. Situational depression  - FLUoxetine (PROZAC) 20 MG Cap; Take 1 Capsule by mouth every day.  Dispense: 90 Capsule; Refill: 1  - ALPRAZolam (XANAX) 1 MG Tab; Take 1 Tablet by mouth at bedtime as needed for Sleep for up to 90 days.  Dispense: 30 Tablet; Refill: 2  3. Primary insomnia  - FLUoxetine (PROZAC) 20 MG Cap; Take 1 Capsule by mouth every day.  Dispense: 90 Capsule; Refill: 1  - ALPRAZolam (XANAX) 1 MG Tab; Take 1 Tablet by mouth at bedtime as needed for Sleep for up to 90 days.  Dispense: 30 Tablet; Refill: 2  4. Stress  - FLUoxetine (PROZAC) 20 MG Cap; Take 1 Capsule by mouth every day.  Dispense: 90 Capsule; Refill: 1  - ALPRAZolam (XANAX) 1 MG Tab; Take 1 Tablet by mouth at bedtime as needed for Sleep for up to 90 days.  Dispense: 30 Tablet; Refill: 2    Obtained and reviewed patient utilization report from [facility] pharmacy database on 7/28/2022 11:00 AM  prior to writing prescription for controlled substance II, III or IV per [place] bill . Based on assessment of the report, the prescription is medically necessary.     Smoking Counseling: Nonsmoker    Follow up: prn    Please note that this dictation was created using voice recognition software. Despite all efforts, some minor grammar mistakes are possible.

## 2022-08-04 DIAGNOSIS — R53.83 FATIGUE, UNSPECIFIED TYPE: ICD-10-CM

## 2022-08-04 DIAGNOSIS — E78.5 DYSLIPIDEMIA: ICD-10-CM

## 2022-08-04 DIAGNOSIS — D72.819 LEUKOPENIA, UNSPECIFIED TYPE: ICD-10-CM

## 2022-08-23 ENCOUNTER — RESEARCH ENCOUNTER (OUTPATIENT)
Dept: RESEARCH | Facility: WORKSITE | Age: 37
End: 2022-08-23
Payer: COMMERCIAL

## 2022-08-23 DIAGNOSIS — Z00.6 RESEARCH STUDY PATIENT: ICD-10-CM

## 2022-09-01 ENCOUNTER — HOSPITAL ENCOUNTER (OUTPATIENT)
Dept: LAB | Facility: MEDICAL CENTER | Age: 37
End: 2022-09-01
Attending: INTERNAL MEDICINE
Payer: COMMERCIAL

## 2022-09-01 DIAGNOSIS — D72.819 LEUKOPENIA, UNSPECIFIED TYPE: ICD-10-CM

## 2022-09-01 DIAGNOSIS — E78.5 DYSLIPIDEMIA: ICD-10-CM

## 2022-09-01 DIAGNOSIS — R53.83 FATIGUE, UNSPECIFIED TYPE: ICD-10-CM

## 2022-09-01 LAB
ALBUMIN SERPL BCP-MCNC: 4.1 G/DL (ref 3.2–4.9)
ALBUMIN/GLOB SERPL: 1.5 G/DL
ALP SERPL-CCNC: 47 U/L (ref 30–99)
ALT SERPL-CCNC: 15 U/L (ref 2–50)
ANION GAP SERPL CALC-SCNC: 11 MMOL/L (ref 7–16)
AST SERPL-CCNC: 21 U/L (ref 12–45)
BASOPHILS # BLD AUTO: 1.2 % (ref 0–1.8)
BASOPHILS # BLD: 0.06 K/UL (ref 0–0.12)
BILIRUB SERPL-MCNC: 0.3 MG/DL (ref 0.1–1.5)
BUN SERPL-MCNC: 20 MG/DL (ref 8–22)
CALCIUM SERPL-MCNC: 9.2 MG/DL (ref 8.5–10.5)
CHLORIDE SERPL-SCNC: 100 MMOL/L (ref 96–112)
CHOLEST SERPL-MCNC: 162 MG/DL (ref 100–199)
CO2 SERPL-SCNC: 25 MMOL/L (ref 20–33)
CREAT SERPL-MCNC: 1.06 MG/DL (ref 0.5–1.4)
EOSINOPHIL # BLD AUTO: 0.23 K/UL (ref 0–0.51)
EOSINOPHIL NFR BLD: 4.7 % (ref 0–6.9)
ERYTHROCYTE [DISTWIDTH] IN BLOOD BY AUTOMATED COUNT: 41.5 FL (ref 35.9–50)
FASTING STATUS PATIENT QL REPORTED: NORMAL
GFR SERPLBLD CREATININE-BSD FMLA CKD-EPI: 69 ML/MIN/1.73 M 2
GLOBULIN SER CALC-MCNC: 2.8 G/DL (ref 1.9–3.5)
GLUCOSE SERPL-MCNC: 94 MG/DL (ref 65–99)
HCT VFR BLD AUTO: 40.3 % (ref 37–47)
HDLC SERPL-MCNC: 66 MG/DL
HGB BLD-MCNC: 13.7 G/DL (ref 12–16)
IMM GRANULOCYTES # BLD AUTO: 0.02 K/UL (ref 0–0.11)
IMM GRANULOCYTES NFR BLD AUTO: 0.4 % (ref 0–0.9)
LDLC SERPL CALC-MCNC: 73 MG/DL
LYMPHOCYTES # BLD AUTO: 1.98 K/UL (ref 1–4.8)
LYMPHOCYTES NFR BLD: 40.4 % (ref 22–41)
MCH RBC QN AUTO: 32.2 PG (ref 27–33)
MCHC RBC AUTO-ENTMCNC: 34 G/DL (ref 33.6–35)
MCV RBC AUTO: 94.6 FL (ref 81.4–97.8)
MONOCYTES # BLD AUTO: 0.44 K/UL (ref 0–0.85)
MONOCYTES NFR BLD AUTO: 9 % (ref 0–13.4)
NEUTROPHILS # BLD AUTO: 2.17 K/UL (ref 2–7.15)
NEUTROPHILS NFR BLD: 44.3 % (ref 44–72)
NRBC # BLD AUTO: 0 K/UL
NRBC BLD-RTO: 0 /100 WBC
PLATELET # BLD AUTO: 193 K/UL (ref 164–446)
PMV BLD AUTO: 10.2 FL (ref 9–12.9)
POTASSIUM SERPL-SCNC: 4.2 MMOL/L (ref 3.6–5.5)
PROT SERPL-MCNC: 6.9 G/DL (ref 6–8.2)
RBC # BLD AUTO: 4.26 M/UL (ref 4.2–5.4)
SODIUM SERPL-SCNC: 136 MMOL/L (ref 135–145)
TRIGL SERPL-MCNC: 113 MG/DL (ref 0–149)
TSH SERPL DL<=0.005 MIU/L-ACNC: 3.1 UIU/ML (ref 0.38–5.33)
WBC # BLD AUTO: 4.9 K/UL (ref 4.8–10.8)

## 2022-09-01 PROCEDURE — 80061 LIPID PANEL: CPT

## 2022-09-01 PROCEDURE — 80053 COMPREHEN METABOLIC PANEL: CPT

## 2022-09-01 PROCEDURE — 84443 ASSAY THYROID STIM HORMONE: CPT

## 2022-09-01 PROCEDURE — 36415 COLL VENOUS BLD VENIPUNCTURE: CPT

## 2022-09-01 PROCEDURE — 85025 COMPLETE CBC W/AUTO DIFF WBC: CPT

## 2022-09-09 RX ORDER — ALPRAZOLAM 0.5 MG/1
TABLET ORAL
COMMUNITY
Start: 2022-07-28 | End: 2022-11-11

## 2022-09-15 ENCOUNTER — PHARMACY VISIT (OUTPATIENT)
Dept: PHARMACY | Facility: MEDICAL CENTER | Age: 37
End: 2022-09-15
Payer: COMMERCIAL

## 2022-09-15 PROCEDURE — RXMED WILLOW AMBULATORY MEDICATION CHARGE: Performed by: INTERNAL MEDICINE

## 2022-09-28 LAB
APOB+LDLR+PCSK9 GENE MUT ANL BLD/T: NOT DETECTED
BRCA1+BRCA2 DEL+DUP + FULL MUT ANL BLD/T: NOT DETECTED
MLH1+MSH2+MSH6+PMS2 GN DEL+DUP+FUL M: NOT DETECTED

## 2022-10-03 ENCOUNTER — IMMUNIZATION (OUTPATIENT)
Dept: OCCUPATIONAL MEDICINE | Facility: CLINIC | Age: 37
End: 2022-10-03

## 2022-10-03 DIAGNOSIS — Z23 NEED FOR VACCINATION: Primary | ICD-10-CM

## 2022-10-03 PROCEDURE — 90686 IIV4 VACC NO PRSV 0.5 ML IM: CPT | Performed by: NURSE PRACTITIONER

## 2022-10-20 ENCOUNTER — APPOINTMENT (OUTPATIENT)
Dept: MEDICAL GROUP | Facility: PHYSICIAN GROUP | Age: 37
End: 2022-10-20
Payer: COMMERCIAL

## 2022-11-08 SDOH — ECONOMIC STABILITY: TRANSPORTATION INSECURITY
IN THE PAST 12 MONTHS, HAS THE LACK OF TRANSPORTATION KEPT YOU FROM MEDICAL APPOINTMENTS OR FROM GETTING MEDICATIONS?: NO

## 2022-11-08 SDOH — HEALTH STABILITY: PHYSICAL HEALTH: ON AVERAGE, HOW MANY MINUTES DO YOU ENGAGE IN EXERCISE AT THIS LEVEL?: 60 MIN

## 2022-11-08 SDOH — ECONOMIC STABILITY: FOOD INSECURITY: WITHIN THE PAST 12 MONTHS, THE FOOD YOU BOUGHT JUST DIDN'T LAST AND YOU DIDN'T HAVE MONEY TO GET MORE.: NEVER TRUE

## 2022-11-08 SDOH — ECONOMIC STABILITY: FOOD INSECURITY: WITHIN THE PAST 12 MONTHS, YOU WORRIED THAT YOUR FOOD WOULD RUN OUT BEFORE YOU GOT MONEY TO BUY MORE.: NEVER TRUE

## 2022-11-08 SDOH — HEALTH STABILITY: PHYSICAL HEALTH: ON AVERAGE, HOW MANY DAYS PER WEEK DO YOU ENGAGE IN MODERATE TO STRENUOUS EXERCISE (LIKE A BRISK WALK)?: 5 DAYS

## 2022-11-08 SDOH — ECONOMIC STABILITY: INCOME INSECURITY: IN THE LAST 12 MONTHS, WAS THERE A TIME WHEN YOU WERE NOT ABLE TO PAY THE MORTGAGE OR RENT ON TIME?: NO

## 2022-11-08 SDOH — ECONOMIC STABILITY: HOUSING INSECURITY
IN THE LAST 12 MONTHS, WAS THERE A TIME WHEN YOU DID NOT HAVE A STEADY PLACE TO SLEEP OR SLEPT IN A SHELTER (INCLUDING NOW)?: NO

## 2022-11-08 SDOH — ECONOMIC STABILITY: INCOME INSECURITY: HOW HARD IS IT FOR YOU TO PAY FOR THE VERY BASICS LIKE FOOD, HOUSING, MEDICAL CARE, AND HEATING?: NOT HARD AT ALL

## 2022-11-08 SDOH — HEALTH STABILITY: MENTAL HEALTH
STRESS IS WHEN SOMEONE FEELS TENSE, NERVOUS, ANXIOUS, OR CAN'T SLEEP AT NIGHT BECAUSE THEIR MIND IS TROUBLED. HOW STRESSED ARE YOU?: VERY MUCH

## 2022-11-08 SDOH — ECONOMIC STABILITY: TRANSPORTATION INSECURITY
IN THE PAST 12 MONTHS, HAS LACK OF TRANSPORTATION KEPT YOU FROM MEETINGS, WORK, OR FROM GETTING THINGS NEEDED FOR DAILY LIVING?: NO

## 2022-11-08 SDOH — ECONOMIC STABILITY: HOUSING INSECURITY

## 2022-11-08 SDOH — ECONOMIC STABILITY: TRANSPORTATION INSECURITY
IN THE PAST 12 MONTHS, HAS LACK OF RELIABLE TRANSPORTATION KEPT YOU FROM MEDICAL APPOINTMENTS, MEETINGS, WORK OR FROM GETTING THINGS NEEDED FOR DAILY LIVING?: NO

## 2022-11-08 ASSESSMENT — SOCIAL DETERMINANTS OF HEALTH (SDOH)
HOW OFTEN DO YOU HAVE SIX OR MORE DRINKS ON ONE OCCASION: MONTHLY
IN A TYPICAL WEEK, HOW MANY TIMES DO YOU TALK ON THE PHONE WITH FAMILY, FRIENDS, OR NEIGHBORS?: MORE THAN THREE TIMES A WEEK
HOW OFTEN DO YOU ATTEND CHURCH OR RELIGIOUS SERVICES?: NEVER
HOW OFTEN DO YOU GET TOGETHER WITH FRIENDS OR RELATIVES?: TWICE A WEEK
HOW OFTEN DO YOU ATTENT MEETINGS OF THE CLUB OR ORGANIZATION YOU BELONG TO?: PATIENT DECLINED
HOW HARD IS IT FOR YOU TO PAY FOR THE VERY BASICS LIKE FOOD, HOUSING, MEDICAL CARE, AND HEATING?: NOT HARD AT ALL
HOW OFTEN DO YOU ATTENT MEETINGS OF THE CLUB OR ORGANIZATION YOU BELONG TO?: PATIENT DECLINED
WITHIN THE PAST 12 MONTHS, YOU WORRIED THAT YOUR FOOD WOULD RUN OUT BEFORE YOU GOT THE MONEY TO BUY MORE: NEVER TRUE
DO YOU BELONG TO ANY CLUBS OR ORGANIZATIONS SUCH AS CHURCH GROUPS UNIONS, FRATERNAL OR ATHLETIC GROUPS, OR SCHOOL GROUPS?: NO
DO YOU BELONG TO ANY CLUBS OR ORGANIZATIONS SUCH AS CHURCH GROUPS UNIONS, FRATERNAL OR ATHLETIC GROUPS, OR SCHOOL GROUPS?: NO
HOW OFTEN DO YOU HAVE A DRINK CONTAINING ALCOHOL: 2-3 TIMES A WEEK
IN A TYPICAL WEEK, HOW MANY TIMES DO YOU TALK ON THE PHONE WITH FAMILY, FRIENDS, OR NEIGHBORS?: MORE THAN THREE TIMES A WEEK
HOW MANY DRINKS CONTAINING ALCOHOL DO YOU HAVE ON A TYPICAL DAY WHEN YOU ARE DRINKING: 1 OR 2
HOW OFTEN DO YOU ATTEND CHURCH OR RELIGIOUS SERVICES?: NEVER
HOW OFTEN DO YOU GET TOGETHER WITH FRIENDS OR RELATIVES?: TWICE A WEEK

## 2022-11-08 ASSESSMENT — LIFESTYLE VARIABLES
SKIP TO QUESTIONS 9-10: 0
HOW MANY STANDARD DRINKS CONTAINING ALCOHOL DO YOU HAVE ON A TYPICAL DAY: 1 OR 2
AUDIT-C TOTAL SCORE: 5
HOW OFTEN DO YOU HAVE A DRINK CONTAINING ALCOHOL: 2-3 TIMES A WEEK
HOW OFTEN DO YOU HAVE SIX OR MORE DRINKS ON ONE OCCASION: MONTHLY

## 2022-11-11 ENCOUNTER — OFFICE VISIT (OUTPATIENT)
Dept: MEDICAL GROUP | Facility: PHYSICIAN GROUP | Age: 37
End: 2022-11-11
Payer: COMMERCIAL

## 2022-11-11 VITALS
BODY MASS INDEX: 25.71 KG/M2 | DIASTOLIC BLOOD PRESSURE: 60 MMHG | WEIGHT: 160 LBS | SYSTOLIC BLOOD PRESSURE: 90 MMHG | HEART RATE: 73 BPM | TEMPERATURE: 98.2 F | HEIGHT: 66 IN | OXYGEN SATURATION: 98 %

## 2022-11-11 DIAGNOSIS — F41.8 SITUATIONAL ANXIETY: ICD-10-CM

## 2022-11-11 DIAGNOSIS — F43.21 SITUATIONAL DEPRESSION: ICD-10-CM

## 2022-11-11 DIAGNOSIS — Z91.030 BEE STING ALLERGY: ICD-10-CM

## 2022-11-11 DIAGNOSIS — J45.990 EXERCISE-INDUCED ASTHMA: ICD-10-CM

## 2022-11-11 PROCEDURE — 99214 OFFICE O/P EST MOD 30 MIN: CPT

## 2022-11-11 RX ORDER — ALPRAZOLAM 1 MG/1
1 TABLET ORAL NIGHTLY PRN
Qty: 30 TABLET | Refills: 0 | Status: SHIPPED | OUTPATIENT
Start: 2022-12-04 | End: 2023-01-03

## 2022-11-11 ASSESSMENT — FIBROSIS 4 INDEX: FIB4 SCORE: 1.04

## 2022-11-11 NOTE — ASSESSMENT & PLAN NOTE
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

## 2022-11-11 NOTE — PROGRESS NOTES
"Subjective:     CC:  Diagnoses of Situational anxiety, Situational depression, Exercise-induced asthma, and Bee sting allergy were pertinent to this visit.    HISTORY OF THE PRESENT ILLNESS: Patient is a 36 y.o. female. This pleasant patient is here today to establish care and discuss the following problems:    Problem   Situational Depression    Chronic condition stable for which patient takes Prozac 20 mg daily.  Doing well on this medication.  Without reported side effects.  Denies any suicidal ideations.     Situational Anxiety    Chronic condition stable patient takes Prozac 20 mg daily as well as occasional Xanax 1 mg as needed for sleep.  Doing well on this medication without reported side effects.  Controlled substance agreement obtained, urine drug screen, and consent for benzodiazepine prescription.  She knows to keep medication in a locked secure location and not use other mind altering substances with this prescription.     Bee Sting Allergy    Remote history of progressively worsening allergic response to minimize bee stings.  She reports about 20 years ago she was stung in the leg and it was pulled up.  She has never had to use epinephrine however she does have this medication on hand if needed.     Exercise-Induced Asthma    Chronic condition stable patient uses albuterol 1 puff prior to exercise.   She reports if she does not use her inhaler sats during exercise are in the low 90s, with inhaler they are high 90s.  She exercises several days per week.  -Admits to environmental triggers including cold air, smoke, and environmental allergens.  -Not currently on daily maintenance inhaler         Health Maintenance: declines    ROS:   Review of Systems   All other systems reviewed and are negative.      Objective:     Exam: BP (!) 90/60 (BP Location: Left arm, Patient Position: Sitting, BP Cuff Size: Adult)   Pulse 73   Temp 36.8 °C (98.2 °F) (Temporal)   Ht 1.676 m (5' 6\")   Wt 72.6 kg (160 lb)   " SpO2 98%  Body mass index is 25.82 kg/m².    Physical Exam  Constitutional:       General: She is not in acute distress.     Appearance: Normal appearance. She is not ill-appearing or toxic-appearing.   HENT:      Head: Normocephalic.   Eyes:      Conjunctiva/sclera: Conjunctivae normal.   Cardiovascular:      Rate and Rhythm: Normal rate and regular rhythm.      Pulses: Normal pulses.      Heart sounds: Normal heart sounds. No murmur heard.  Pulmonary:      Effort: Pulmonary effort is normal. No respiratory distress.      Breath sounds: Rhonchi (RLL) present.   Skin:     General: Skin is warm and dry.   Neurological:      General: No focal deficit present.      Mental Status: She is alert and oriented to person, place, and time.   Psychiatric:         Mood and Affect: Mood normal.         Behavior: Behavior normal.         Labs:   No visits with results within 1 Month(s) from this visit.   Latest known visit with results is:   Hospital Outpatient Visit on 09/01/2022   Component Date Value    WBC 09/01/2022 4.9     RBC 09/01/2022 4.26     Hemoglobin 09/01/2022 13.7     Hematocrit 09/01/2022 40.3     MCV 09/01/2022 94.6     MCH 09/01/2022 32.2     MCHC 09/01/2022 34.0     RDW 09/01/2022 41.5     Platelet Count 09/01/2022 193     MPV 09/01/2022 10.2     Neutrophils-Polys 09/01/2022 44.30     Lymphocytes 09/01/2022 40.40     Monocytes 09/01/2022 9.00     Eosinophils 09/01/2022 4.70     Basophils 09/01/2022 1.20     Immature Granulocytes 09/01/2022 0.40     Nucleated RBC 09/01/2022 0.00     Neutrophils (Absolute) 09/01/2022 2.17     Lymphs (Absolute) 09/01/2022 1.98     Monos (Absolute) 09/01/2022 0.44     Eos (Absolute) 09/01/2022 0.23     Baso (Absolute) 09/01/2022 0.06     Immature Granulocytes (a* 09/01/2022 0.02     NRBC (Absolute) 09/01/2022 0.00     Cholesterol,Tot 09/01/2022 162     Triglycerides 09/01/2022 113     HDL 09/01/2022 66     LDL 09/01/2022 73     TSH 09/01/2022 3.100     Sodium 09/01/2022 136      Potassium 09/01/2022 4.2     Chloride 09/01/2022 100     Co2 09/01/2022 25     Anion Gap 09/01/2022 11.0     Glucose 09/01/2022 94     Bun 09/01/2022 20     Creatinine 09/01/2022 1.06     Calcium 09/01/2022 9.2     AST(SGOT) 09/01/2022 21     ALT(SGPT) 09/01/2022 15     Alkaline Phosphatase 09/01/2022 47     Total Bilirubin 09/01/2022 0.3     Albumin 09/01/2022 4.1     Total Protein 09/01/2022 6.9     Globulin 09/01/2022 2.8     A-G Ratio 09/01/2022 1.5     Fasting Status 09/01/2022 Fasting     GFR (CKD-EPI) 09/01/2022 69          Assessment & Plan: Medical Decision Making   36 y.o. female with the following -    Problem List Items Addressed This Visit       Exercise-induced asthma     Chronic condition stable with use of inhaler  -Continue albuterol 108 mcg per attenuation inhaler 1 to 2 puffs 10 to 15 minutes prior to exercise  -ED precautions given and known for exacerbation of this condition  -Recommend avoidance of known triggers and implementing allergy relief measures  -Recommend consideration of daily maintenance inhaler in the future for worsening or progression of this condition         Bee sting allergy     Chronic condition stable  - Continue to use epinephrine 0.3 mg per 0.3 mils autoinjector if needed for bee sting, refill as needed  -ED precautions given and known for anaphylaxis         Situational depression     Chronic stable condition  - Recommend continuation of 20 mg of Prozac per day we will refill when needed  -ED precautions given and known for SI           Relevant Medications    ALPRAZolam (XANAX) 1 MG Tab (Start on 12/4/2022)    Situational anxiety     Controlled substance visit today for Xanax refill.  Patient has been stable on Xanax 1 mg daily as needed for anxiety.   shows no abnormal prescribing or filling behavior.  Controlled substance agreement up-to-date.  Urine drug screen lab slip given  -Xanax 1 mg as needed daily refilled, risk benefits and side effects of this medication  "discussed  -Urine toxicology screen to be obtained  -Controlled substance abuse agreement contract signed and scanned into patient's chart  -Controlled substance discussed with client. Client agrees to abide by controlled substance contract.   -A Risk of Abuse assessment for benzodiazepine abuse was previously preformed and documented in the past medical history. The treatment plan was reviewed and discussed with the patient. The pharmacy monitoring report was requested and reviewed.  Patient is appropriate for refill of this medication.  -Patient informed no medication adjustments will be made to titrate up or add additional narcotics, benzodiazepines or other controlled substances to this regimen.  Referral to pain management or behavioral health will be made as appropriate.    Patient understands this prescription is a controlled substance which is potentially habit-forming and its use is regulated by the [employer]. We also discussed the new \"black box\" warning regarding the lethal combination of opioids and benzodiazepines. Refills are subject to terms of a controlled substance agreement and patient has an updated one on file. Most recent UDS is appropriate. Any refill requires an office visit. Narcotics, benzodiazepines, stimulants, and sleep aids may have adverse effects and the risks of addiction, accidental overdose and death were emphasized. Provided prescriptions for the next Xanax for three months.           Relevant Medications    ALPRAZolam (XANAX) 1 MG Tab (Start on 12/4/2022)    Other Relevant Orders    Controlled Substance Treatment Agreement    Pain Management Screen       Differential diagnosis, natural history, supportive care, and indications for immediate follow-up discussed.  Shared decision making approach was utilized, and patient is amendable with plan of care.  Patient understands to return to clinic or go to the emergency department if symptoms worsen. All questions and concerns " addressed.      Return in about 3 months (around 2/11/2023).    Please note that this dictation was created using voice recognition software. I have made every reasonable attempt to correct obvious errors, but I expect that there are errors of grammar and possibly content that I did not discover before finalizing the note.

## 2022-11-11 NOTE — ASSESSMENT & PLAN NOTE
"Controlled substance visit today for Xanax refill.  Patient has been stable on Xanax 1 mg daily as needed for anxiety.   shows no abnormal prescribing or filling behavior.  Controlled substance agreement up-to-date.  Urine drug screen lab slip given  -Xanax 1 mg as needed daily refilled, risk benefits and side effects of this medication discussed  -Urine toxicology screen to be obtained  -Controlled substance abuse agreement contract signed and scanned into patient's chart  -Controlled substance discussed with client. Client agrees to abide by controlled substance contract.   -A Risk of Abuse assessment for benzodiazepine abuse was previously preformed and documented in the past medical history. The treatment plan was reviewed and discussed with the patient. The pharmacy monitoring report was requested and reviewed.  Patient is appropriate for refill of this medication.  -Patient informed no medication adjustments will be made to titrate up or add additional narcotics, benzodiazepines or other controlled substances to this regimen.  Referral to pain management or behavioral health will be made as appropriate.    Patient understands this prescription is a controlled substance which is potentially habit-forming and its use is regulated by the [employer]. We also discussed the new \"black box\" warning regarding the lethal combination of opioids and benzodiazepines. Refills are subject to terms of a controlled substance agreement and patient has an updated one on file. Most recent UDS is appropriate. Any refill requires an office visit. Narcotics, benzodiazepines, stimulants, and sleep aids may have adverse effects and the risks of addiction, accidental overdose and death were emphasized. Provided prescriptions for the next Xanax for three months.    "

## 2022-11-11 NOTE — ASSESSMENT & PLAN NOTE
Chronic condition stable with use of inhaler  -Continue albuterol 108 mcg per attenuation inhaler 1 to 2 puffs 10 to 15 minutes prior to exercise  -ED precautions given and known for exacerbation of this condition  -Recommend avoidance of known triggers and implementing allergy relief measures  -Recommend consideration of daily maintenance inhaler in the future for worsening or progression of this condition

## 2022-11-11 NOTE — ASSESSMENT & PLAN NOTE
Chronic stable condition  - Recommend continuation of 20 mg of Prozac per day we will refill when needed  -ED precautions given and known for SI

## 2022-12-02 ENCOUNTER — HOSPITAL ENCOUNTER (OUTPATIENT)
Dept: LAB | Facility: MEDICAL CENTER | Age: 37
End: 2022-12-02
Payer: COMMERCIAL

## 2022-12-02 DIAGNOSIS — F41.8 SITUATIONAL ANXIETY: ICD-10-CM

## 2022-12-02 PROCEDURE — G0481 DRUG TEST DEF 8-14 CLASSES: HCPCS

## 2022-12-07 LAB
1OH-MIDAZOLAM UR QL SCN: NOT DETECTED
6MAM UR QL: NOT DETECTED
7AMINOCLONAZEPAM UR QL: NOT DETECTED
A-OH ALPRAZ UR QL: PRESENT
ALPRAZ UR QL: PRESENT
AMPHET UR QL SCN: NOT DETECTED
ANNOTATION COMMENT IMP: NORMAL
ANNOTATION COMMENT IMP: NORMAL
BARBITURATES UR QL: NOT DETECTED
BUPRENORPHINE UR QL: NOT DETECTED
BZE UR QL: NOT DETECTED
CARBOXYTHC UR QL: NOT DETECTED
CARISOPRODOL UR QL: NOT DETECTED
CLONAZEPAM UR QL: NOT DETECTED
CODEINE UR QL: NOT DETECTED
DIAZEPAM UR QL: NOT DETECTED
ETHYL GLUCURONIDE UR QL: PRESENT
FENTANYL UR QL: NOT DETECTED
GABAPENTIN UR QL: NOT DETECTED
HYDROCODONE UR QL: NOT DETECTED
HYDROMORPHONE UR QL: NOT DETECTED
LORAZEPAM UR QL: NOT DETECTED
MDA UR QL: NOT DETECTED
MDEA UR QL: NOT DETECTED
MDMA UR QL: NOT DETECTED
MEPERIDINE UR QL: NOT DETECTED
METHADONE UR QL: NOT DETECTED
METHAMPHET UR QL: NOT DETECTED
MIDAZOLAM UR QL SCN: NOT DETECTED
MORPHINE UR QL: NOT DETECTED
NALOXONE UR QL SCN: NOT DETECTED
NORBUPRENORPHINE UR QL CFM: NOT DETECTED
NORDIAZEPAM UR QL: NOT DETECTED
NORFENTANYL UR QL: NOT DETECTED
NORHYDROCODONE UR QL CFM: NOT DETECTED
NOROXYCODONE UR QL CFM: NOT DETECTED
NOROXYMORPH CO100 Q0458: NOT DETECTED
OXAZEPAM UR QL: PRESENT
OXYCODONE UR QL: NOT DETECTED
OXYMORPHONE UR QL: NOT DETECTED
PATHOLOGY STUDY: NORMAL
PCP UR QL: NOT DETECTED
PHENTERMINE UR QL: NOT DETECTED
PPAA UR QL: NOT DETECTED
PREGABALIN UR QL SCN: NOT DETECTED
SERVICE CMNT-IMP: NORMAL
TAPENADOL OSULF CO200 Q0473: NOT DETECTED
TAPENTADOL UR QL SCN: NOT DETECTED
TEMAZEPAM UR QL: PRESENT
TRAMADOL UR QL: NOT DETECTED
ZOLPIDEM PHENYL-4-CARB UR QL SCN: NOT DETECTED
ZOLPIDEM UR QL: NOT DETECTED

## 2022-12-20 DIAGNOSIS — Z30.011 ENCOUNTER FOR INITIAL PRESCRIPTION OF CONTRACEPTIVE PILLS: ICD-10-CM

## 2022-12-20 RX ORDER — NORETHINDRONE ACETATE AND ETHINYL ESTRADIOL .02; 1 MG/1; MG/1
1 TABLET ORAL DAILY
Qty: 84 TABLET | Refills: 3 | Status: SHIPPED
Start: 2022-12-20 | End: 2023-01-18

## 2023-01-18 ENCOUNTER — OFFICE VISIT (OUTPATIENT)
Dept: MEDICAL GROUP | Facility: PHYSICIAN GROUP | Age: 38
End: 2023-01-18
Payer: COMMERCIAL

## 2023-01-18 ENCOUNTER — HOSPITAL ENCOUNTER (OUTPATIENT)
Facility: MEDICAL CENTER | Age: 38
End: 2023-01-18
Payer: COMMERCIAL

## 2023-01-18 VITALS
SYSTOLIC BLOOD PRESSURE: 110 MMHG | HEART RATE: 55 BPM | WEIGHT: 162.4 LBS | DIASTOLIC BLOOD PRESSURE: 60 MMHG | TEMPERATURE: 97.9 F | OXYGEN SATURATION: 97 % | BODY MASS INDEX: 26.1 KG/M2 | HEIGHT: 66 IN

## 2023-01-18 DIAGNOSIS — Z12.4 CERVICAL CANCER SCREENING: ICD-10-CM

## 2023-01-18 DIAGNOSIS — Z01.419 WELL WOMAN EXAM: ICD-10-CM

## 2023-01-18 DIAGNOSIS — Z23 NEED FOR VACCINATION: ICD-10-CM

## 2023-01-18 PROCEDURE — 88175 CYTOPATH C/V AUTO FLUID REDO: CPT

## 2023-01-18 PROCEDURE — 90651 9VHPV VACCINE 2/3 DOSE IM: CPT

## 2023-01-18 PROCEDURE — 90471 IMMUNIZATION ADMIN: CPT

## 2023-01-18 PROCEDURE — 87624 HPV HI-RISK TYP POOLED RSLT: CPT

## 2023-01-18 PROCEDURE — 99395 PREV VISIT EST AGE 18-39: CPT | Mod: 25

## 2023-01-18 RX ORDER — ALPRAZOLAM 1 MG/1
TABLET ORAL
COMMUNITY
End: 2023-01-25 | Stop reason: SDUPTHER

## 2023-01-18 ASSESSMENT — FIBROSIS 4 INDEX: FIB4 SCORE: 1.07

## 2023-01-18 ASSESSMENT — PATIENT HEALTH QUESTIONNAIRE - PHQ9: CLINICAL INTERPRETATION OF PHQ2 SCORE: 0

## 2023-01-18 NOTE — PROGRESS NOTES
Subjective:     CC:   Chief Complaint   Patient presents with    Gynecologic Exam     pap    Medication Refill     xanax       HPI:   Nickie Jett is a 37 y.o. female who presents for annual exam-She has no complaints at this time.    Patient has GYN provider: No   Last Pap Smear: 2020   H/O Abnormal Pap: NO, but HPV is positive   Last Mammogram: Diagnostic mammogram in 2017-w/lumpectomy-normal  Last Bone Density Test: none  Last Colorectal Cancer Screening: none. Family history of colon CA, also suffers from constipation, hemorroids/fissures  Last Tdap: UTD  Received HPV series: No    Exercise: strenuous regular exercise, aerobic > 3 hours a week  Diet: Healthy        No LMP recorded. Patient has had an implant.  Hx STDs: No  Birth control: Pill- has not started, boyfriend with male sterilization  Menses every month with 3-5 days with light, moderate bleeding.  Denies cramping.  No significant bloating/fluid retention, pelvic pain, or dyspareunia. No abnormal vaginal discharge.  No breast tenderness, mass, nipple discharge, changes in size or contour, or abnormal cyclic discomfort.    OB History    Para Term  AB Living   2 1 1 0 1 0   SAB IAB Ectopic Molar Multiple Live Births   0 1 0 0 0 0      She  reports being sexually active. She reports using the following methods of birth control/protection: I.U.D. and Coitus Interruptus.    She  has a past medical history of Anesthesia, ASTHMA, Bowel habit changes, Cholesteatoma, and Insomnia.  She  has a past surgical history that includes pr anesth,ear surgery (); mammoplasty augmentation (2012); pr breast augmentation with implant; breast biopsy (Right, 2017); breast implant removal (Bilateral, 2021); breast implant revision (Bilateral, 2021); and liposuction (2021).    Family History   Problem Relation Age of Onset    Hypertension Mother     No Known Problems Father     No Known Problems Brother     Hypertension Maternal  Grandmother     Diabetes Paternal Grandmother     Allergies Paternal Grandfather         asthma    Cancer Neg Hx     Heart Disease Neg Hx     Hyperlipidemia Neg Hx     Stroke Neg Hx     Thyroid Neg Hx      Social History     Tobacco Use    Smoking status: Never    Smokeless tobacco: Never   Vaping Use    Vaping Use: Never used   Substance Use Topics    Alcohol use: Yes     Alcohol/week: 2.4 oz     Types: 4 Standard drinks or equivalent per week    Drug use: Yes     Types: Marijuana     Comment: mj edibles       Patient Active Problem List    Diagnosis Date Noted    Cervical cancer screening 01/18/2023    Primary insomnia 09/30/2020    Situational depression 03/26/2020    Situational anxiety 03/26/2020    H/O lumpectomy 10/12/2018    Bee sting allergy 11/09/2016    Cholesteatoma of right ear 04/11/2016    Exercise-induced asthma 10/22/2015     Current Outpatient Medications   Medication Sig Dispense Refill    FLUoxetine (PROZAC) 20 MG Cap Take 1 Capsule by mouth every day. 90 Capsule 1    EPINEPHrine (EPIPEN) 0.3 MG/0.3ML Solution Auto-injector solution for injection Inject by intramuscular route once as needed for anaphylaxis. 1 Each 0    albuterol 108 (90 Base) MCG/ACT Aero Soln inhalation aerosol Inhale 2 Puffs every 6 hours as needed for Shortness of Breath. 8.5 g 3    ALPRAZolam (XANAX) 1 MG Tab       MISC NATURAL PRODUCTS EX Apply 1 pump to face nightly       No current facility-administered medications for this visit.     Allergies   Allergen Reactions    Bee Venom Swelling       Review of Systems   Constitutional: Negative for fever, chills and malaise/fatigue.   HENT: Negative for congestion.    Eyes: Negative for pain.   Respiratory: Negative for cough and shortness of breath.    Cardiovascular: Negative for chest pain and leg swelling.   Gastrointestinal: Negative for nausea, vomiting, abdominal pain and diarrhea.   Genitourinary: Negative for dysuria and hematuria.   Skin: Negative for rash.  "  Neurological: Negative for dizziness, focal weakness and headaches.   Endo/Heme/Allergies: Does not bruise/bleed easily.   Psychiatric/Behavioral: Negative for depression.  The patient is not nervous/anxious.      Objective:   /60 (BP Location: Left arm, Patient Position: Sitting, BP Cuff Size: Adult)   Pulse (!) 55   Temp 36.6 °C (97.9 °F) (Temporal)   Ht 1.676 m (5' 6\")   Wt 73.7 kg (162 lb 6.4 oz)   SpO2 97%   BMI 26.21 kg/m²     Wt Readings from Last 4 Encounters:   01/18/23 73.7 kg (162 lb 6.4 oz)   11/11/22 72.6 kg (160 lb)   07/28/22 68.9 kg (152 lb)   05/03/22 70.3 kg (155 lb)       Physical Exam:  Constitutional: Well-developed and well-nourished. Not diaphoretic. No distress.   Skin: Skin is warm and dry. No rash noted.  Head: Atraumatic without lesions.  Eyes: Conjunctivae and extraocular motions are normal. Pupils are equal, round, and reactive to light. No scleral icterus.   Ears:  External ears unremarkable. Tympanic membranes clear and intact.  Nose: Nares patent. Septum midline. Turbinates without erythema nor edema. No discharge.   Mouth/Throat: Tongue normal. Oropharynx is clear and moist. Posterior pharynx without erythema or exudates.  Neck: Supple, trachea midline. Normal range of motion. No thyromegaly present. No lymphadenopathy--cervical or supraclavicular.  Cardiovascular: Regular rate and rhythm, S1 and S2 without murmur, rubs, or gallops.    Respiratory: Effort normal. Clear to auscultation throughout. No adventitious sounds.   Abdomen: Soft, non tender, and without distention. Active bowel sounds in all four quadrants. No rebound, guarding, masses or HSM.  : Perineum and external genitalia normal without rash. Vagina with normal and physiologic discharge. Cervix without visible lesions or discharge. Bimanual exam without adnexal masses or cervical motion tenderness.  Extremities: No cyanosis, clubbing, erythema, nor edema. Distal pulses intact and symmetric. "   Musculoskeletal: All major joints AROM full in all directions without pain.  Neurological: Alert and oriented x 3. Grossly non-focal. Strength and sensation grossly intact. DTRs 2+/3 and symmetric.   Psychiatric:  Behavior, mood, and affect are appropriate.    Assessment and Plan:     1. Need for vaccination  - Gardasil 9    2. Well woman exam  - THINPREP PAP WITH HPV; Future    3. Cervical cancer screening  - THINPREP PAP WITH HPV; Future    Other orders  - ALPRAZolam (XANAX) 1 MG Tab      Health maintenance:  UTD, declines PCV today, requesting HPV   Labs per orders  Immunizations per orders  Patient counseled about skin care, diet, supplements, and exercise.  Discussed Anticipatory guidance regarding breast self exam, mammography screening, diet and exercise, importance of wearing a seatbelt, not texting and driving, alcohol use, drug use, mammography, colonoscopy, and vaccines.    Follow-up: Return in about 1 year (around 1/18/2024) for Annual Wellness Visit.

## 2023-01-19 DIAGNOSIS — Z12.4 CERVICAL CANCER SCREENING: ICD-10-CM

## 2023-01-19 DIAGNOSIS — Z01.419 WELL WOMAN EXAM: ICD-10-CM

## 2023-01-19 LAB
AMBIGUOUS DTTM AMBI4: NORMAL
CYTOLOGY REG CYTOL: NORMAL
HPV HR 12 DNA CVX QL NAA+PROBE: NEGATIVE
HPV16 DNA SPEC QL NAA+PROBE: NEGATIVE
HPV18 DNA SPEC QL NAA+PROBE: NEGATIVE
SPECIMEN SOURCE: NORMAL

## 2023-01-23 ENCOUNTER — TELEPHONE (OUTPATIENT)
Dept: HEALTH INFORMATION MANAGEMENT | Facility: OTHER | Age: 38
End: 2023-01-23
Payer: COMMERCIAL

## 2023-01-23 DIAGNOSIS — K59.09 CHRONIC CONSTIPATION: ICD-10-CM

## 2023-01-23 NOTE — TELEPHONE ENCOUNTER
1. Caller Name: Mariaelena Jett                        Call Back Number: [phone]      How would the patient prefer to be contacted with a response: MyChart message    2. SPECIFIC Action To Be Taken: Referral pending, please sign.    3. Diagnosis/Clinical Reason for Request: Chronic Constipation    4. Specialty & Provider Name/Lab/Imaging Location: Gastroenterology, Dr Katie Arrington    5. Is appointment scheduled for requested order/referral: no    Patient was  informed they will receive a return phone call from the office ONLY if there are any questions before processing their request. Advised to call back if they haven't received a call from the referral department in 5 days.    Patient note:   Reason: Chronic Constipation  Requested provider: Katie Arrington  Comment:  May I have referral? Thanks.

## 2023-01-25 DIAGNOSIS — F51.01 PRIMARY INSOMNIA: ICD-10-CM

## 2023-01-25 DIAGNOSIS — F41.9 ANXIETY: ICD-10-CM

## 2023-01-25 RX ORDER — ALPRAZOLAM 1 MG/1
1 TABLET ORAL NIGHTLY PRN
Qty: 30 TABLET | Refills: 0 | Status: SHIPPED | OUTPATIENT
Start: 2023-01-25 | End: 2023-02-26 | Stop reason: SDUPTHER

## 2023-01-25 RX ORDER — ALPRAZOLAM 1 MG/1
TABLET ORAL
Qty: 30 TABLET | Status: CANCELLED | OUTPATIENT
Start: 2023-01-25

## 2023-01-25 NOTE — PROGRESS NOTES
Controlled substance discussed with client. Client agrees to abide by controlled substance contract.  CSA-UTD  UDS-UTD  PDMP checked and patient is appropriate for refills- Xanax 1 mg prn QHS sent to pharmacy.

## 2023-02-26 DIAGNOSIS — F51.01 PRIMARY INSOMNIA: ICD-10-CM

## 2023-02-26 DIAGNOSIS — F41.9 ANXIETY: ICD-10-CM

## 2023-02-27 RX ORDER — ALPRAZOLAM 1 MG/1
1 TABLET ORAL NIGHTLY PRN
Qty: 30 TABLET | Refills: 0 | Status: SHIPPED | OUTPATIENT
Start: 2023-02-27 | End: 2023-03-22 | Stop reason: SDUPTHER

## 2023-03-22 DIAGNOSIS — Z78.9 USES BIRTH CONTROL: ICD-10-CM

## 2023-03-22 DIAGNOSIS — F51.01 PRIMARY INSOMNIA: ICD-10-CM

## 2023-03-22 DIAGNOSIS — F41.9 ANXIETY: ICD-10-CM

## 2023-03-22 RX ORDER — NORETHINDRONE ACETATE AND ETHINYL ESTRADIOL .02; 1 MG/1; MG/1
1 TABLET ORAL DAILY
Qty: 84 TABLET | Refills: 3 | Status: SHIPPED
Start: 2023-03-22 | End: 2023-07-27

## 2023-03-22 RX ORDER — ALPRAZOLAM 1 MG/1
1 TABLET ORAL NIGHTLY PRN
Qty: 30 TABLET | Refills: 0 | Status: SHIPPED | OUTPATIENT
Start: 2023-03-29 | End: 2023-04-27 | Stop reason: SDUPTHER

## 2023-04-27 ENCOUNTER — OFFICE VISIT (OUTPATIENT)
Dept: MEDICAL GROUP | Facility: PHYSICIAN GROUP | Age: 38
End: 2023-04-27
Payer: COMMERCIAL

## 2023-04-27 VITALS
HEIGHT: 66 IN | SYSTOLIC BLOOD PRESSURE: 100 MMHG | HEART RATE: 59 BPM | WEIGHT: 159.6 LBS | BODY MASS INDEX: 25.65 KG/M2 | TEMPERATURE: 97.7 F | OXYGEN SATURATION: 100 % | DIASTOLIC BLOOD PRESSURE: 60 MMHG

## 2023-04-27 DIAGNOSIS — Z23 NEED FOR VACCINATION: ICD-10-CM

## 2023-04-27 DIAGNOSIS — F41.9 ANXIETY: ICD-10-CM

## 2023-04-27 DIAGNOSIS — F51.01 PRIMARY INSOMNIA: ICD-10-CM

## 2023-04-27 PROCEDURE — 90651 9VHPV VACCINE 2/3 DOSE IM: CPT

## 2023-04-27 PROCEDURE — 99213 OFFICE O/P EST LOW 20 MIN: CPT | Mod: 25

## 2023-04-27 PROCEDURE — 90471 IMMUNIZATION ADMIN: CPT

## 2023-04-27 RX ORDER — ALPRAZOLAM 1 MG/1
1 TABLET ORAL NIGHTLY PRN
Qty: 30 TABLET | Refills: 0 | Status: SHIPPED | OUTPATIENT
Start: 2023-04-27 | End: 2023-07-27 | Stop reason: SDUPTHER

## 2023-04-27 RX ORDER — ALPRAZOLAM 1 MG/1
1 TABLET ORAL NIGHTLY PRN
Qty: 30 TABLET | Refills: 0 | Status: SHIPPED | OUTPATIENT
Start: 2023-05-27 | End: 2023-06-26

## 2023-04-27 RX ORDER — LINACLOTIDE 145 UG/1
72 CAPSULE, GELATIN COATED ORAL
COMMUNITY
End: 2024-03-11

## 2023-04-27 RX ORDER — ALPRAZOLAM 1 MG/1
1 TABLET ORAL NIGHTLY PRN
Qty: 30 TABLET | Refills: 0 | Status: SHIPPED | OUTPATIENT
Start: 2023-06-26 | End: 2023-07-26

## 2023-04-27 ASSESSMENT — ENCOUNTER SYMPTOMS
DIARRHEA: 0
HEADACHES: 0
COUGH: 0
SHORTNESS OF BREATH: 0
WEIGHT LOSS: 0
FEVER: 0
NAUSEA: 0
ABDOMINAL PAIN: 0
DIZZINESS: 0
BLURRED VISION: 0
CONSTIPATION: 0
MYALGIAS: 0
VOMITING: 0
CHILLS: 0
WEAKNESS: 0

## 2023-04-27 ASSESSMENT — FIBROSIS 4 INDEX: FIB4 SCORE: 1.07

## 2023-04-27 NOTE — PROGRESS NOTES
"Subjective:     CC: Medication refill    HPI:   JAROCHO presents today with    No problems updated.    Health Maintenance: Completed    ROS:  Review of Systems   Constitutional:  Negative for chills, fever, malaise/fatigue and weight loss.   Eyes:  Negative for blurred vision.   Respiratory:  Negative for cough and shortness of breath.    Cardiovascular:  Negative for chest pain.   Gastrointestinal:  Negative for abdominal pain, constipation, diarrhea, nausea and vomiting.   Musculoskeletal:  Negative for myalgias.   Neurological:  Negative for dizziness, weakness and headaches.     Objective:     Exam:  /60 (BP Location: Left arm, Patient Position: Sitting, BP Cuff Size: Adult)   Pulse (!) 59   Temp 36.5 °C (97.7 °F) (Temporal)   Ht 1.676 m (5' 6\")   Wt 72.4 kg (159 lb 9.6 oz)   SpO2 100%   BMI 25.76 kg/m²  Body mass index is 25.76 kg/m².    Physical Exam  Constitutional:       General: She is not in acute distress.     Appearance: Normal appearance. She is not ill-appearing or toxic-appearing.   HENT:      Head: Normocephalic.   Eyes:      Conjunctiva/sclera: Conjunctivae normal.   Pulmonary:      Effort: Pulmonary effort is normal.   Skin:     General: Skin is warm and dry.   Neurological:      General: No focal deficit present.      Mental Status: She is alert and oriented to person, place, and time.   Psychiatric:         Mood and Affect: Mood normal.         Behavior: Behavior normal.       Labs:   Lab Results   Component Value Date/Time    CHOLSTRLTOT 162 09/01/2022 07:11 AM    LDL 73 09/01/2022 07:11 AM    HDL 66 09/01/2022 07:11 AM    TRIGLYCERIDE 113 09/01/2022 07:11 AM       Lab Results   Component Value Date/Time    SODIUM 136 09/01/2022 07:11 AM    POTASSIUM 4.2 09/01/2022 07:11 AM    CHLORIDE 100 09/01/2022 07:11 AM    CO2 25 09/01/2022 07:11 AM    GLUCOSE 94 09/01/2022 07:11 AM    BUN 20 09/01/2022 07:11 AM    CREATININE 1.06 09/01/2022 07:11 AM     Lab Results   Component Value Date/Time "    ALKPHOSPHAT 47 09/01/2022 07:11 AM    ASTSGOT 21 09/01/2022 07:11 AM    ALTSGPT 15 09/01/2022 07:11 AM    TBILIRUBIN 0.3 09/01/2022 07:11 AM      Lab Results   Component Value Date/Time    HBA1C 5.4 12/05/2020 12:52 PM     No results found for: TSH  No results found for: FREET4      Assessment & Plan: Medical Decision Making     37 y.o. female with the following -     1. Primary insomnia  Chronic condition stable  - ALPRAZolam (XANAX) 1 MG Tab; Take 1 Tablet by mouth at bedtime as needed for Sleep for up to 30 days. Indications: Feeling Anxious  Dispense: 30 Tablet; Refill: 0  - ALPRAZolam (XANAX) 1 MG Tab; Take 1 Tablet by mouth at bedtime as needed for Sleep for up to 30 days. Indications: Feeling Anxious  Dispense: 30 Tablet; Refill: 0  - ALPRAZolam (XANAX) 1 MG Tab; Take 1 Tablet by mouth at bedtime as needed for Sleep for up to 30 days. Indications: Feeling Anxious  Dispense: 30 Tablet; Refill: 0  Controlled substance visit today for anxiety with insomnia component.  Patient has been stable on alprazolam 1 mg if needed nightly for insomnia and anxiety.   shows no abnormal prescribing or filling behavior.  Controlled substance agreement up-to-date.  Urine drug screen up-to-date.  -Alprazolam refilled, risk benefits and side effects of this medication discussed   -Urine toxicology screen obtained in clinic  -Controlled substance abuse agreement contract signed and scanned into patient's chart  -Controlled substance discussed with client. Client agrees to abide by controlled substance contract.   -A Risk of Abuse assessment for opioid abuse was previously preformed and documented in the past medical history. The treatment plan was reviewed and discussed with the patient. The pharmacy monitoring report was requested and reviewed.  Patient is appropriate for refill of this medication.  -Patient informed no medication adjustments will be made to titrate up or add additional narcotics, benzodiazepines or other  "controlled substances to this regimen.  Referral to pain management or behavioral health will be made as appropriate.    Patient understands this prescription is a controlled substance which is potentially habit-forming and its use is regulated by the [employer]. We also discussed the new \"black box\" warning regarding the lethal combination of opioids and benzodiazepines. Refills are subject to terms of a controlled substance agreement and patient has an updated one on file. Most recent UDS is appropriate. Any refill requires an office visit. Narcotics, benzodiazepines, stimulants, and sleep aids may have adverse effects and the risks of addiction, accidental overdose and death were emphasized. Provided prescriptions for the next three months.    2. Anxiety  Chronic stable  - ALPRAZolam (XANAX) 1 MG Tab; Take 1 Tablet by mouth at bedtime as needed for Sleep for up to 30 days. Indications: Feeling Anxious  Dispense: 30 Tablet; Refill: 0  - ALPRAZolam (XANAX) 1 MG Tab; Take 1 Tablet by mouth at bedtime as needed for Sleep for up to 30 days. Indications: Feeling Anxious  Dispense: 30 Tablet; Refill: 0  - ALPRAZolam (XANAX) 1 MG Tab; Take 1 Tablet by mouth at bedtime as needed for Sleep for up to 30 days. Indications: Feeling Anxious  Dispense: 30 Tablet; Refill: 0    3. Need for vaccination    - Gardasil 9          Differential diagnosis, natural history, supportive care, and indications for immediate follow-up discussed.  Shared decision making approach utilized, and patient is amendable with plan of care.  Patient understands to return to clinic or go to the emergency department if symptoms worsen. All questions and concerns addressed to the best of my knowledge.    Return in about 3 months (around 7/27/2023).    Please note that this dictation was created using voice recognition software. I have made every reasonable attempt to correct obvious errors, but I expect that there are errors of grammar and possibly content that " I did not discover before finalizing the note.

## 2023-06-13 DIAGNOSIS — F43.21 SITUATIONAL DEPRESSION: ICD-10-CM

## 2023-06-13 DIAGNOSIS — F43.9 STRESS: ICD-10-CM

## 2023-06-13 DIAGNOSIS — F41.8 SITUATIONAL ANXIETY: ICD-10-CM

## 2023-06-13 DIAGNOSIS — F51.01 PRIMARY INSOMNIA: ICD-10-CM

## 2023-06-13 RX ORDER — FLUOXETINE HYDROCHLORIDE 20 MG/1
20 CAPSULE ORAL DAILY
Qty: 90 CAPSULE | Refills: 0 | Status: SHIPPED | OUTPATIENT
Start: 2023-06-13 | End: 2023-09-06

## 2023-07-27 ENCOUNTER — OFFICE VISIT (OUTPATIENT)
Dept: MEDICAL GROUP | Facility: PHYSICIAN GROUP | Age: 38
End: 2023-07-27
Payer: COMMERCIAL

## 2023-07-27 VITALS
DIASTOLIC BLOOD PRESSURE: 50 MMHG | HEIGHT: 66 IN | TEMPERATURE: 98.1 F | HEART RATE: 54 BPM | WEIGHT: 164.8 LBS | OXYGEN SATURATION: 98 % | SYSTOLIC BLOOD PRESSURE: 86 MMHG | BODY MASS INDEX: 26.48 KG/M2

## 2023-07-27 DIAGNOSIS — Z13.0 SCREENING FOR DEFICIENCY ANEMIA: ICD-10-CM

## 2023-07-27 DIAGNOSIS — Z13.1 DIABETES MELLITUS SCREENING: ICD-10-CM

## 2023-07-27 DIAGNOSIS — Z13.6 SCREENING FOR CARDIOVASCULAR CONDITION: ICD-10-CM

## 2023-07-27 DIAGNOSIS — F41.9 ANXIETY: ICD-10-CM

## 2023-07-27 DIAGNOSIS — F51.01 PRIMARY INSOMNIA: ICD-10-CM

## 2023-07-27 PROCEDURE — 99213 OFFICE O/P EST LOW 20 MIN: CPT

## 2023-07-27 PROCEDURE — 3074F SYST BP LT 130 MM HG: CPT

## 2023-07-27 PROCEDURE — 3078F DIAST BP <80 MM HG: CPT

## 2023-07-27 RX ORDER — ALPRAZOLAM 1 MG/1
1 TABLET ORAL NIGHTLY PRN
Qty: 30 TABLET | Refills: 2 | Status: SHIPPED | OUTPATIENT
Start: 2023-08-04 | End: 2023-10-11 | Stop reason: SDUPTHER

## 2023-07-27 ASSESSMENT — FIBROSIS 4 INDEX: FIB4 SCORE: 1.07

## 2023-07-27 NOTE — PROGRESS NOTES
"Subjective:     CC: Medication refill    HPI:   JAROCHO presents today with    Problem   Primary Insomnia    Chronic condition for which patient takes alprazolam 1 mg nightly if needed for insomnia.  She has done well on this medication without reported side effects.  Practices sleep hygiene as well.         Health Maintenance: Declines    ROS:  Review of Systems   All other systems reviewed and are negative.      Objective:     Exam:  BP (!) 86/50 (BP Location: Left arm, Patient Position: Sitting, BP Cuff Size: Adult)   Pulse (!) 54   Temp 36.7 °C (98.1 °F) (Temporal)   Ht 1.676 m (5' 6\")   Wt 74.8 kg (164 lb 12.8 oz)   SpO2 98%   BMI 26.60 kg/m²  Body mass index is 26.6 kg/m².    Physical Exam  Constitutional:       General: She is not in acute distress.     Appearance: Normal appearance. She is not ill-appearing or toxic-appearing.   HENT:      Head: Normocephalic.   Eyes:      Conjunctiva/sclera: Conjunctivae normal.   Pulmonary:      Effort: Pulmonary effort is normal.   Skin:     General: Skin is warm and dry.   Neurological:      General: No focal deficit present.      Mental Status: She is alert and oriented to person, place, and time.   Psychiatric:         Mood and Affect: Mood normal.         Behavior: Behavior normal.         Labs:   Lab Results   Component Value Date/Time    CHOLSTRLTOT 162 09/01/2022 07:11 AM    LDL 73 09/01/2022 07:11 AM    HDL 66 09/01/2022 07:11 AM    TRIGLYCERIDE 113 09/01/2022 07:11 AM       Lab Results   Component Value Date/Time    SODIUM 136 09/01/2022 07:11 AM    POTASSIUM 4.2 09/01/2022 07:11 AM    CHLORIDE 100 09/01/2022 07:11 AM    CO2 25 09/01/2022 07:11 AM    GLUCOSE 94 09/01/2022 07:11 AM    BUN 20 09/01/2022 07:11 AM    CREATININE 1.06 09/01/2022 07:11 AM     Lab Results   Component Value Date/Time    ALKPHOSPHAT 47 09/01/2022 07:11 AM    ASTSGOT 21 09/01/2022 07:11 AM    ALTSGPT 15 09/01/2022 07:11 AM    TBILIRUBIN 0.3 09/01/2022 07:11 AM      Lab Results " "  Component Value Date/Time    HBA1C 5.4 12/05/2020 12:52 PM         Assessment & Plan: Medical Decision Making     37 y.o. female with the following -     Problem List Items Addressed This Visit       Primary insomnia     Controlled substance visit today for Xanax refill.  Patient has been stable on Xanax 1 mg daily as needed for insomnia and anxiety.   shows no abnormal prescribing or filling behavior.  Controlled substance agreement up-to-date.  Urine drug screen up-to-date  -Xanax 1 mg as needed daily refilled, risk benefits and side effects of this medication discussed  -Controlled substance discussed with client. Client agrees to abide by controlled substance contract.   -A Risk of Abuse assessment for benzodiazepine abuse was previously preformed and documented in the past medical history. The treatment plan was reviewed and discussed with the patient. The pharmacy monitoring report was requested and reviewed.  Patient is appropriate for refill of this medication.  -Patient informed no medication adjustments will be made to titrate up or add additional narcotics, benzodiazepines or other controlled substances to this regimen.  Referral to pain management or behavioral health will be made as appropriate.    Patient understands this prescription is a controlled substance which is potentially habit-forming and its use is regulated by the [employer]. We also discussed the new \"black box\" warning regarding the lethal combination of opioids and benzodiazepines. Refills are subject to terms of a controlled substance agreement and patient has an updated one on file. Most recent UDS is appropriate. Any refill requires an office visit. Narcotics, benzodiazepines, stimulants, and sleep aids may have adverse effects and the risks of addiction, accidental overdose and death were emphasized. Provided prescriptions for the next Xanax for three months.           Relevant Medications    ALPRAZolam (XANAX) 1 MG Tab (Start on " 8/4/2023)    Other Relevant Orders    Comp Metabolic Panel    CBC WITH DIFFERENTIAL     Other Visit Diagnoses       Anxiety        Relevant Medications    ALPRAZolam (XANAX) 1 MG Tab (Start on 8/4/2023)    Screening for deficiency anemia        Relevant Orders    CBC WITH DIFFERENTIAL    Screening for cardiovascular condition        Relevant Orders    Lipid Profile    Diabetes mellitus screening        Relevant Orders    HEMOGLOBIN A1C            Differential diagnosis, natural history, supportive care, and indications for immediate follow-up discussed.  Shared decision making approach utilized, and patient is amendable with plan of care.  Patient understands to return to clinic or go to the emergency department if symptoms worsen. All questions and concerns addressed to the best of my knowledge.    Return in about 3 months (around 10/27/2023) for F/U Lab Review.    Please note that this dictation was created using voice recognition software. I have made every reasonable attempt to correct obvious errors, but I expect that there are errors of grammar and possibly content that I did not discover before finalizing the note.

## 2023-07-27 NOTE — ASSESSMENT & PLAN NOTE
"Controlled substance visit today for Xanax refill.  Patient has been stable on Xanax 1 mg daily as needed for insomnia and anxiety.   shows no abnormal prescribing or filling behavior.  Controlled substance agreement up-to-date.  Urine drug screen up-to-date  -Xanax 1 mg as needed daily refilled, risk benefits and side effects of this medication discussed  -Controlled substance discussed with client. Client agrees to abide by controlled substance contract.   -A Risk of Abuse assessment for benzodiazepine abuse was previously preformed and documented in the past medical history. The treatment plan was reviewed and discussed with the patient. The pharmacy monitoring report was requested and reviewed.  Patient is appropriate for refill of this medication.  -Patient informed no medication adjustments will be made to titrate up or add additional narcotics, benzodiazepines or other controlled substances to this regimen.  Referral to pain management or behavioral health will be made as appropriate.    Patient understands this prescription is a controlled substance which is potentially habit-forming and its use is regulated by the [employer]. We also discussed the new \"black box\" warning regarding the lethal combination of opioids and benzodiazepines. Refills are subject to terms of a controlled substance agreement and patient has an updated one on file. Most recent UDS is appropriate. Any refill requires an office visit. Narcotics, benzodiazepines, stimulants, and sleep aids may have adverse effects and the risks of addiction, accidental overdose and death were emphasized. Provided prescriptions for the next Xanax for three months.    "

## 2023-09-12 ENCOUNTER — PATIENT MESSAGE (OUTPATIENT)
Dept: MEDICAL GROUP | Facility: PHYSICIAN GROUP | Age: 38
End: 2023-09-12
Payer: COMMERCIAL

## 2023-09-12 RX ORDER — ALBUTEROL SULFATE 90 UG/1
2 AEROSOL, METERED RESPIRATORY (INHALATION) EVERY 6 HOURS PRN
Qty: 8.5 G | Refills: 3 | Status: SHIPPED | OUTPATIENT
Start: 2023-09-12

## 2023-09-26 ENCOUNTER — IMMUNIZATION (OUTPATIENT)
Dept: OCCUPATIONAL MEDICINE | Facility: CLINIC | Age: 38
End: 2023-09-26

## 2023-09-26 DIAGNOSIS — Z23 NEED FOR VACCINATION: Primary | ICD-10-CM

## 2023-09-26 PROCEDURE — 90686 IIV4 VACC NO PRSV 0.5 ML IM: CPT | Performed by: PREVENTIVE MEDICINE

## 2023-10-11 ENCOUNTER — OFFICE VISIT (OUTPATIENT)
Dept: MEDICAL GROUP | Facility: PHYSICIAN GROUP | Age: 38
End: 2023-10-11
Payer: COMMERCIAL

## 2023-10-11 ENCOUNTER — APPOINTMENT (OUTPATIENT)
Dept: MEDICAL GROUP | Facility: PHYSICIAN GROUP | Age: 38
End: 2023-10-11
Payer: COMMERCIAL

## 2023-10-11 VITALS
BODY MASS INDEX: 26.48 KG/M2 | WEIGHT: 164.8 LBS | SYSTOLIC BLOOD PRESSURE: 112 MMHG | HEART RATE: 56 BPM | TEMPERATURE: 97.9 F | OXYGEN SATURATION: 98 % | DIASTOLIC BLOOD PRESSURE: 80 MMHG | HEIGHT: 66 IN

## 2023-10-11 DIAGNOSIS — F51.01 PRIMARY INSOMNIA: ICD-10-CM

## 2023-10-11 DIAGNOSIS — F41.9 ANXIETY: ICD-10-CM

## 2023-10-11 DIAGNOSIS — F41.8 SITUATIONAL ANXIETY: ICD-10-CM

## 2023-10-11 DIAGNOSIS — E66.3 OVERWEIGHT (BMI 25.0-29.9): ICD-10-CM

## 2023-10-11 PROCEDURE — 3074F SYST BP LT 130 MM HG: CPT

## 2023-10-11 PROCEDURE — 99214 OFFICE O/P EST MOD 30 MIN: CPT

## 2023-10-11 PROCEDURE — 3079F DIAST BP 80-89 MM HG: CPT

## 2023-10-11 RX ORDER — PHENTERMINE HYDROCHLORIDE 15 MG/1
15 CAPSULE ORAL EVERY MORNING
Qty: 90 CAPSULE | Refills: 0 | Status: SHIPPED | OUTPATIENT
Start: 2023-10-11 | End: 2023-11-08 | Stop reason: SDUPTHER

## 2023-10-11 RX ORDER — ALPRAZOLAM 1 MG/1
1 TABLET ORAL NIGHTLY PRN
Qty: 30 TABLET | Refills: 2 | Status: SHIPPED | OUTPATIENT
Start: 2023-11-10 | End: 2024-02-08 | Stop reason: SDUPTHER

## 2023-10-11 ASSESSMENT — ENCOUNTER SYMPTOMS
NAUSEA: 0
SHORTNESS OF BREATH: 0
HEADACHES: 0
COUGH: 0
WEIGHT LOSS: 0
WEAKNESS: 0
DIZZINESS: 0
BLURRED VISION: 0
ABDOMINAL PAIN: 0
CONSTIPATION: 0
MYALGIAS: 0
CHILLS: 0
FEVER: 0
DIARRHEA: 0
VOMITING: 0

## 2023-10-11 ASSESSMENT — FIBROSIS 4 INDEX: FIB4 SCORE: 1.07

## 2023-10-11 NOTE — ASSESSMENT & PLAN NOTE
Chronic condition  -No history of cardiac concerns, does report situational anxiety but feels this is well managed with Prozac and Xanax if needed.  - Prescribed phentermine 15 mg daily as requested-side effects discussed including increase in anxiety symptoms  - recommended to continue with healthy lifestyle  -PDMP checked and patient is appropriate for fill of this medication

## 2023-10-11 NOTE — PROGRESS NOTES
Chronic conditions subjective:     CC: med refill and weight loss    HPI:   JAROCHO presents today with    Problem   Overweight (Bmi 25.0-29.9)    Chronic condition  - patient weight today is 164 lbs and BMI 26.6  - patient eats healthy and remains active/exercises regularly  - reports 10 lb weight gain within the past 6 months despite healthy lifestyle, patient is here today requesting options for pharmacological interventions to aid with weight loss     Situational Anxiety    Chronic condition  -Takes xanax daily tolerates well without reported side effects, also takes prozac 20 mg daily    Last Fill: 10/11/2023, will post date to 11/10/2023  UDS: LUISITO  CS agreement: UTD 11/2023    Is the pain medication improving the patient’s symptoms: Yes  Any adverse effects: No    Alcohol or illicit drug use:   She  reports current alcohol use of about 2.4 oz of alcohol per week.  She  reports current drug use. Drug: Marijuana.     History of controlled substance used in a way other than prescribed? No  Any early refills of a controlled substance: No  History of lost or stolen controlled substance prescription: No  Any aberrant behavior or intoxication while on a controlled substance: No  Has the patient self-modified their dose or frequency of the medication :No  Compliant with treatment recommendations and plan: Yes  Any major health change to the patient: No  Concerns for misuse, abuse or addiction: No    /NarxCheck report reviewed: Yes  History of abnormal drug screening: No         ROS:  Review of Systems   Constitutional:  Negative for chills, fever, malaise/fatigue and weight loss.   Eyes:  Negative for blurred vision.   Respiratory:  Negative for cough and shortness of breath.    Cardiovascular:  Negative for chest pain.   Gastrointestinal:  Negative for abdominal pain, constipation, diarrhea, nausea and vomiting.   Musculoskeletal:  Negative for myalgias.   Neurological:  Negative for dizziness, weakness and  "headaches.       Objective:     Exam:  /80 (BP Location: Left arm, Patient Position: Sitting, BP Cuff Size: Adult)   Pulse (!) 56   Temp 36.6 °C (97.9 °F) (Temporal)   Ht 1.676 m (5' 6\")   Wt 74.8 kg (164 lb 12.8 oz)   SpO2 98%   BMI 26.60 kg/m²  Body mass index is 26.6 kg/m².    Physical Exam  Constitutional:       General: She is not in acute distress.     Appearance: Normal appearance. She is not ill-appearing or toxic-appearing.   HENT:      Head: Normocephalic.   Eyes:      Conjunctiva/sclera: Conjunctivae normal.   Cardiovascular:      Rate and Rhythm: Normal rate and regular rhythm.      Pulses: Normal pulses.      Heart sounds: Normal heart sounds. No murmur heard.  Pulmonary:      Effort: Pulmonary effort is normal. No respiratory distress.      Breath sounds: Normal breath sounds.   Skin:     General: Skin is warm and dry.   Neurological:      General: No focal deficit present.      Mental Status: She is alert and oriented to person, place, and time.   Psychiatric:         Mood and Affect: Mood normal.         Behavior: Behavior normal.       Labs: no recent labs    Assessment & Plan: Medical Decision Making     37 y.o. female with the following -     Problem List Items Addressed This Visit       Situational anxiety     Controlled substance visit today for Xanax refill.  Patient has been stable on Xanax 1 mg daily as needed for anxiety.   shows no abnormal prescribing or filling behavior.  Controlled substance agreement up-to-date.  Urine drug screen lab slip given  -Xanax 1 mg as needed daily refilled, risk benefits and side effects of this medication discussed  -Urine toxicology screen to be obtained  -Controlled substance abuse agreement contract signed and scanned into patient's chart  -Controlled substance discussed with client. Client agrees to abide by controlled substance contract.   -A Risk of Abuse assessment for benzodiazepine abuse was previously preformed and documented in the " "past medical history. The treatment plan was reviewed and discussed with the patient. The pharmacy monitoring report was requested and reviewed.  Patient is appropriate for refill of this medication.  -Patient informed no medication adjustments will be made to titrate up or add additional narcotics, benzodiazepines or other controlled substances to this regimen.  Referral to pain management or behavioral health will be made as appropriate.    Patient understands this prescription is a controlled substance which is potentially habit-forming and its use is regulated by the [employer]. We also discussed the new \"black box\" warning regarding the lethal combination of opioids and benzodiazepines. Refills are subject to terms of a controlled substance agreement and patient has an updated one on file. Most recent UDS is appropriate. Any refill requires an office visit. Narcotics, benzodiazepines, stimulants, and sleep aids may have adverse effects and the risks of addiction, accidental overdose and death were emphasized. Provided prescriptions for the next Xanax for three months.             Relevant Medications    ALPRAZolam (XANAX) 1 MG Tab (Start on 11/10/2023)    Primary insomnia    Relevant Medications    ALPRAZolam (XANAX) 1 MG Tab (Start on 11/10/2023)    Overweight (BMI 25.0-29.9)     Chronic condition  -No history of cardiac concerns, does report situational anxiety but feels this is well managed with Prozac and Xanax if needed.  - Prescribed phentermine 15 mg daily as requested-side effects discussed including increase in anxiety symptoms  - recommended to continue with healthy lifestyle  -PDMP checked and patient is appropriate for fill of this medication         Relevant Medications    phentermine 15 MG capsule     Other Visit Diagnoses       Anxiety        Relevant Medications    ALPRAZolam (XANAX) 1 MG Tab (Start on 11/10/2023)            Differential diagnosis, natural history, supportive care, and indications for " immediate follow-up discussed.  Shared decision making approach utilized, and patient is amendable with plan of care.  Patient understands to return to clinic or go to the emergency department if symptoms worsen. All questions and concerns addressed to the best of my knowledge.    Return in about 3 months (around 1/11/2024).    Please note that this dictation was created using voice recognition software. I have made every reasonable attempt to correct obvious errors, but I expect that there are errors of grammar and possibly content that I did not discover before finalizing the note.

## 2023-10-19 ENCOUNTER — PHARMACY VISIT (OUTPATIENT)
Dept: PHARMACY | Facility: MEDICAL CENTER | Age: 38
End: 2023-10-19
Payer: COMMERCIAL

## 2023-10-19 PROCEDURE — RXMED WILLOW AMBULATORY MEDICATION CHARGE: Performed by: INTERNAL MEDICINE

## 2023-10-19 RX ORDER — COVID-19 VACCINE, MRNA 0.04 MG/.418ML
INJECTION, SUSPENSION INTRAMUSCULAR
Qty: 0.3 ML | Refills: 0 | Status: SHIPPED | OUTPATIENT
Start: 2023-10-19 | End: 2024-03-11

## 2023-10-24 DIAGNOSIS — Z11.3 SCREENING EXAMINATION FOR SEXUALLY TRANSMITTED DISEASE: ICD-10-CM

## 2023-10-24 DIAGNOSIS — Z51.81 THERAPEUTIC DRUG MONITORING: ICD-10-CM

## 2023-11-08 DIAGNOSIS — E66.3 OVERWEIGHT (BMI 25.0-29.9): ICD-10-CM

## 2023-11-08 RX ORDER — PHENTERMINE HYDROCHLORIDE 15 MG/1
30 CAPSULE ORAL EVERY MORNING
Qty: 120 CAPSULE | Refills: 0 | Status: SHIPPED | OUTPATIENT
Start: 2023-11-08 | End: 2024-01-10 | Stop reason: SDUPTHER

## 2023-12-05 ENCOUNTER — HOSPITAL ENCOUNTER (OUTPATIENT)
Dept: LAB | Facility: MEDICAL CENTER | Age: 38
End: 2023-12-05
Payer: COMMERCIAL

## 2023-12-05 DIAGNOSIS — Z51.81 THERAPEUTIC DRUG MONITORING: ICD-10-CM

## 2023-12-05 DIAGNOSIS — Z11.3 SCREENING EXAMINATION FOR SEXUALLY TRANSMITTED DISEASE: ICD-10-CM

## 2023-12-05 DIAGNOSIS — F51.01 PRIMARY INSOMNIA: ICD-10-CM

## 2023-12-05 DIAGNOSIS — Z13.6 SCREENING FOR CARDIOVASCULAR CONDITION: ICD-10-CM

## 2023-12-05 DIAGNOSIS — Z13.0 SCREENING FOR DEFICIENCY ANEMIA: ICD-10-CM

## 2023-12-05 DIAGNOSIS — Z13.1 DIABETES MELLITUS SCREENING: ICD-10-CM

## 2023-12-05 LAB
ALBUMIN SERPL BCP-MCNC: 4.4 G/DL (ref 3.2–4.9)
ALBUMIN/GLOB SERPL: 1.4 G/DL
ALP SERPL-CCNC: 56 U/L (ref 30–99)
ALT SERPL-CCNC: 25 U/L (ref 2–50)
ANION GAP SERPL CALC-SCNC: 12 MMOL/L (ref 7–16)
AST SERPL-CCNC: 33 U/L (ref 12–45)
BASOPHILS # BLD AUTO: 1 % (ref 0–1.8)
BASOPHILS # BLD: 0.04 K/UL (ref 0–0.12)
BILIRUB SERPL-MCNC: 0.3 MG/DL (ref 0.1–1.5)
BUN SERPL-MCNC: 17 MG/DL (ref 8–22)
C TRACH DNA SPEC QL NAA+PROBE: NEGATIVE
CALCIUM ALBUM COR SERPL-MCNC: 8.9 MG/DL (ref 8.5–10.5)
CALCIUM SERPL-MCNC: 9.2 MG/DL (ref 8.5–10.5)
CHLORIDE SERPL-SCNC: 101 MMOL/L (ref 96–112)
CHOLEST SERPL-MCNC: 176 MG/DL (ref 100–199)
CO2 SERPL-SCNC: 24 MMOL/L (ref 20–33)
CREAT SERPL-MCNC: 0.9 MG/DL (ref 0.5–1.4)
EOSINOPHIL # BLD AUTO: 0.16 K/UL (ref 0–0.51)
EOSINOPHIL NFR BLD: 4 % (ref 0–6.9)
ERYTHROCYTE [DISTWIDTH] IN BLOOD BY AUTOMATED COUNT: 42.2 FL (ref 35.9–50)
EST. AVERAGE GLUCOSE BLD GHB EST-MCNC: 103 MG/DL
FASTING STATUS PATIENT QL REPORTED: NORMAL
GFR SERPLBLD CREATININE-BSD FMLA CKD-EPI: 83 ML/MIN/1.73 M 2
GLOBULIN SER CALC-MCNC: 3.2 G/DL (ref 1.9–3.5)
GLUCOSE SERPL-MCNC: 96 MG/DL (ref 65–99)
HBA1C MFR BLD: 5.2 % (ref 4–5.6)
HBV CORE AB SERPL QL IA: NONREACTIVE
HBV SURFACE AB SERPL IA-ACNC: 331 MIU/ML (ref 0–10)
HBV SURFACE AG SER QL: NORMAL
HCT VFR BLD AUTO: 41.3 % (ref 37–47)
HCV AB SER QL: NORMAL
HDLC SERPL-MCNC: 78 MG/DL
HGB BLD-MCNC: 14 G/DL (ref 12–16)
HIV 1+2 AB+HIV1 P24 AG SERPL QL IA: NORMAL
IMM GRANULOCYTES # BLD AUTO: 0.02 K/UL (ref 0–0.11)
IMM GRANULOCYTES NFR BLD AUTO: 0.5 % (ref 0–0.9)
LDLC SERPL CALC-MCNC: 85 MG/DL
LYMPHOCYTES # BLD AUTO: 1.97 K/UL (ref 1–4.8)
LYMPHOCYTES NFR BLD: 48.6 % (ref 22–41)
MCH RBC QN AUTO: 31.8 PG (ref 27–33)
MCHC RBC AUTO-ENTMCNC: 33.9 G/DL (ref 32.2–35.5)
MCV RBC AUTO: 93.9 FL (ref 81.4–97.8)
MONOCYTES # BLD AUTO: 0.37 K/UL (ref 0–0.85)
MONOCYTES NFR BLD AUTO: 9.1 % (ref 0–13.4)
N GONORRHOEA DNA SPEC QL NAA+PROBE: NEGATIVE
NEUTROPHILS # BLD AUTO: 1.49 K/UL (ref 1.82–7.42)
NEUTROPHILS NFR BLD: 36.8 % (ref 44–72)
NRBC # BLD AUTO: 0 K/UL
NRBC BLD-RTO: 0 /100 WBC (ref 0–0.2)
PLATELET # BLD AUTO: 239 K/UL (ref 164–446)
PMV BLD AUTO: 9.9 FL (ref 9–12.9)
POTASSIUM SERPL-SCNC: 4.7 MMOL/L (ref 3.6–5.5)
PROT SERPL-MCNC: 7.6 G/DL (ref 6–8.2)
RBC # BLD AUTO: 4.4 M/UL (ref 4.2–5.4)
SODIUM SERPL-SCNC: 137 MMOL/L (ref 135–145)
SPECIMEN SOURCE: NORMAL
T PALLIDUM AB SER QL IA: NORMAL
TRIGL SERPL-MCNC: 66 MG/DL (ref 0–149)
WBC # BLD AUTO: 4.1 K/UL (ref 4.8–10.8)

## 2023-12-05 PROCEDURE — 86706 HEP B SURFACE ANTIBODY: CPT

## 2023-12-05 PROCEDURE — 83036 HEMOGLOBIN GLYCOSYLATED A1C: CPT

## 2023-12-05 PROCEDURE — 87491 CHLMYD TRACH DNA AMP PROBE: CPT

## 2023-12-05 PROCEDURE — 87389 HIV-1 AG W/HIV-1&-2 AB AG IA: CPT

## 2023-12-05 PROCEDURE — 86780 TREPONEMA PALLIDUM: CPT

## 2023-12-05 PROCEDURE — 87340 HEPATITIS B SURFACE AG IA: CPT

## 2023-12-05 PROCEDURE — 85025 COMPLETE CBC W/AUTO DIFF WBC: CPT

## 2023-12-05 PROCEDURE — 86704 HEP B CORE ANTIBODY TOTAL: CPT

## 2023-12-05 PROCEDURE — 87591 N.GONORRHOEAE DNA AMP PROB: CPT

## 2023-12-05 PROCEDURE — 80061 LIPID PANEL: CPT

## 2023-12-05 PROCEDURE — 80053 COMPREHEN METABOLIC PANEL: CPT

## 2023-12-05 PROCEDURE — 86803 HEPATITIS C AB TEST: CPT

## 2023-12-05 PROCEDURE — 36415 COLL VENOUS BLD VENIPUNCTURE: CPT

## 2023-12-05 PROCEDURE — G0481 DRUG TEST DEF 8-14 CLASSES: HCPCS

## 2023-12-08 DIAGNOSIS — R10.11 RUQ PAIN: ICD-10-CM

## 2023-12-09 LAB
1OH-MIDAZOLAM UR QL SCN: NOT DETECTED
6MAM UR QL: NOT DETECTED
7AMINOCLONAZEPAM UR QL: NOT DETECTED
A-OH ALPRAZ UR QL: PRESENT
ALPRAZ UR QL: PRESENT
AMPHET UR QL SCN: NOT DETECTED
ANNOTATION COMMENT IMP: NORMAL
BARBITURATES UR QL: NEGATIVE
BUPRENORPHINE UR QL: NOT DETECTED
BZE UR QL: NEGATIVE
CARBOXYTHC UR QL: NORMAL
CARISOPRODOL UR QL: NEGATIVE
CLONAZEPAM UR QL: NOT DETECTED
CODEINE UR QL: NOT DETECTED
DIAZEPAM UR QL: NOT DETECTED
ETHYL GLUCURONIDE UR QL: NEGATIVE
FENTANYL UR QL: NOT DETECTED
GABAPENTIN UR QL CFM: NOT DETECTED
HYDROCODONE UR QL: NOT DETECTED
HYDROMORPHONE UR QL: NOT DETECTED
LORAZEPAM UR QL: NOT DETECTED
MDA UR QL: NOT DETECTED
MDEA UR QL: NOT DETECTED
MDMA UR QL: NOT DETECTED
ME-PHENIDATE UR QL: NOT DETECTED
METHADONE UR QL: NEGATIVE
METHAMPHET UR QL: NOT DETECTED
MIDAZOLAM UR QL SCN: NOT DETECTED
MORPHINE UR QL: NOT DETECTED
NALOXONE UR QL CFM: NOT DETECTED
NORBUPRENORPHINE UR QL CFM: NOT DETECTED
NORDIAZEPAM UR QL: NOT DETECTED
NORFENTANYL UR QL: NOT DETECTED
NORHYDROCODONE UR QL CFM: NOT DETECTED
NORMEPERIDINE UR QL CFM: NOT DETECTED
NOROXYCODONE UR QL CFM: NOT DETECTED
NOROXYMORPHONE UR QL SCN: NOT DETECTED
OXAZEPAM UR QL: PRESENT
OXYCODONE UR QL: NOT DETECTED
OXYMORPHONE UR QL: NOT DETECTED
PATHOLOGY STUDY: NORMAL
PCP UR QL: NEGATIVE
PHENTERMINE UR QL: PRESENT
PREGABALIN UR QL CFM: NOT DETECTED
SERVICE CMNT-IMP: NORMAL
TAPENTADOL UR QL SCN: NOT DETECTED
TAPENTADOL UR QL SCN: NOT DETECTED
TEMAZEPAM UR QL: PRESENT
TRAMADOL UR QL: NEGATIVE
ZOLPIDEM PHENYL-4-CARB UR QL SCN: NOT DETECTED
ZOLPIDEM UR QL: NOT DETECTED

## 2023-12-13 ENCOUNTER — OFFICE VISIT (OUTPATIENT)
Dept: MEDICAL GROUP | Facility: PHYSICIAN GROUP | Age: 38
End: 2023-12-13
Payer: COMMERCIAL

## 2023-12-13 VITALS
TEMPERATURE: 97.5 F | WEIGHT: 157.4 LBS | OXYGEN SATURATION: 100 % | DIASTOLIC BLOOD PRESSURE: 60 MMHG | HEART RATE: 78 BPM | SYSTOLIC BLOOD PRESSURE: 104 MMHG | BODY MASS INDEX: 25.3 KG/M2 | HEIGHT: 66 IN

## 2023-12-13 DIAGNOSIS — E66.3 OVERWEIGHT (BMI 25.0-29.9): ICD-10-CM

## 2023-12-13 DIAGNOSIS — Z23 NEED FOR VACCINATION: ICD-10-CM

## 2023-12-13 DIAGNOSIS — D70.9 NEUTROPENIA, UNSPECIFIED TYPE (HCC): ICD-10-CM

## 2023-12-13 PROCEDURE — 99214 OFFICE O/P EST MOD 30 MIN: CPT | Mod: 25

## 2023-12-13 PROCEDURE — 90471 IMMUNIZATION ADMIN: CPT

## 2023-12-13 PROCEDURE — 3074F SYST BP LT 130 MM HG: CPT

## 2023-12-13 PROCEDURE — 3078F DIAST BP <80 MM HG: CPT

## 2023-12-13 PROCEDURE — 90651 9VHPV VACCINE 2/3 DOSE IM: CPT

## 2023-12-13 ASSESSMENT — ENCOUNTER SYMPTOMS
COUGH: 0
DIARRHEA: 0
SHORTNESS OF BREATH: 0
BLURRED VISION: 0
CONSTIPATION: 0
WEIGHT LOSS: 0
DIZZINESS: 0
HEADACHES: 0
NAUSEA: 0
FEVER: 0
ABDOMINAL PAIN: 0
WEAKNESS: 0
VOMITING: 0
CHILLS: 0
MYALGIAS: 0

## 2023-12-13 ASSESSMENT — FIBROSIS 4 INDEX: FIB4 SCORE: 1.08

## 2023-12-13 NOTE — ASSESSMENT & PLAN NOTE
Chronic condition  -No history of cardiac concerns, does report situational anxiety but feels this is well managed with Prozac and Xanax if needed.  - Prescribed phentermine 30 mg daily will be filling again 1/2023  - recommended to continue with healthy lifestyle  -UDS and CS agreement UTD

## 2023-12-13 NOTE — PROGRESS NOTES
"Subjective:     CC: vaccine, lab review    HPI:   JAROCHO presents today with    Problem   Neutropenia (Hcc)    This is a new condition recently noted on CBC. Patient denies history of illness or infection prior to labs. Of not did have Covid vaccine about 5 weeks prior, she did endorse feeling poorly after vaccine. WBC mildly low at 4.1   Latest Reference Range & Units 12/05/23 06:28   Neutrophils-Polys 44.00 - 72.00 % 36.80 (L)   Neutrophils (Absolute) 1.82 - 7.42 K/uL 1.49 (L)   Lymphocytes 22.00 - 41.00 % 48.60 (H)      Overweight (Bmi 25.0-29.9)    Chronic condition  - patient weight today is 157 (155 home scale) down from 164 lbs last visit in Oct 2023  - patient eats healthy and remains active/exercises regularly  -On phentermine 30 mg and tolerating well, no side effects           ROS:  Review of Systems   Constitutional:  Negative for chills, fever, malaise/fatigue and weight loss.   Eyes:  Negative for blurred vision.   Respiratory:  Negative for cough and shortness of breath.    Cardiovascular:  Negative for chest pain.   Gastrointestinal:  Negative for abdominal pain, constipation, diarrhea, nausea and vomiting.   Musculoskeletal:  Negative for myalgias.   Neurological:  Negative for dizziness, weakness and headaches.       Objective:     Exam:  /60 (BP Location: Left arm, Patient Position: Standing, BP Cuff Size: Adult)   Pulse 78   Temp 36.4 °C (97.5 °F) (Temporal)   Ht 1.676 m (5' 6\")   Wt 71.4 kg (157 lb 6.4 oz)   SpO2 100%   BMI 25.41 kg/m²  Body mass index is 25.41 kg/m².    Physical Exam  Constitutional:       General: She is not in acute distress.     Appearance: Normal appearance. She is not ill-appearing or toxic-appearing.   HENT:      Head: Normocephalic.   Eyes:      Conjunctiva/sclera: Conjunctivae normal.   Pulmonary:      Effort: Pulmonary effort is normal.   Skin:     General: Skin is warm and dry.   Neurological:      General: No focal deficit present.      Mental Status: " "She is alert and oriented to person, place, and time.   Psychiatric:         Mood and Affect: Mood normal.         Behavior: Behavior normal.         Labs:   Lab Results   Component Value Date/Time    CHOLSTRLTOT 176 12/05/2023 06:28 AM    LDL 85 12/05/2023 06:28 AM    HDL 78 12/05/2023 06:28 AM    TRIGLYCERIDE 66 12/05/2023 06:28 AM       Lab Results   Component Value Date/Time    SODIUM 137 12/05/2023 06:28 AM    POTASSIUM 4.7 12/05/2023 06:28 AM    CHLORIDE 101 12/05/2023 06:28 AM    CO2 24 12/05/2023 06:28 AM    GLUCOSE 96 12/05/2023 06:28 AM    BUN 17 12/05/2023 06:28 AM    CREATININE 0.90 12/05/2023 06:28 AM     Lab Results   Component Value Date/Time    ALKPHOSPHAT 56 12/05/2023 06:28 AM    ASTSGOT 33 12/05/2023 06:28 AM    ALTSGPT 25 12/05/2023 06:28 AM    TBILIRUBIN 0.3 12/05/2023 06:28 AM      Lab Results   Component Value Date/Time    HBA1C 5.2 12/05/2023 06:28 AM    HBA1C 5.4 12/05/2020 12:52 PM     No results found for: \"TSH\"  No results found for: \"FREET4\"  No results found for: \"CBC\"      Assessment & Plan: Medical Decision Making     38 y.o. female with the following -     Problem List Items Addressed This Visit       Overweight (BMI 25.0-29.9)     Chronic condition  -No history of cardiac concerns, does report situational anxiety but feels this is well managed with Prozac and Xanax if needed.  - Prescribed phentermine 30 mg daily will be filling again 1/2023  - recommended to continue with healthy lifestyle  -UDS and CS agreement UTD         Neutropenia (HCC)     New condition- appears non toxic, stable  Repeat CBC in 1-3 months         Relevant Orders    CBC WITH DIFFERENTIAL     Other Visit Diagnoses       Need for vaccination        Relevant Orders    9VHPV Vaccine 2-3 Dose (GARDASIL 9) (Completed)            Differential diagnosis, natural history, supportive care, and indications for immediate follow-up discussed.  Shared decision making approach utilized, and patient is amendable with plan of " care.  Patient understands to return to clinic or go to the emergency department if symptoms worsen. All questions and concerns addressed to the best of my knowledge.    Return in about 3 months (around 3/13/2024).    Please note that this dictation was created using voice recognition software. I have made every reasonable attempt to correct obvious errors, but I expect that there are errors of grammar and possibly content that I did not discover before finalizing the note.

## 2024-01-02 ENCOUNTER — HOSPITAL ENCOUNTER (OUTPATIENT)
Dept: RADIOLOGY | Facility: MEDICAL CENTER | Age: 39
End: 2024-01-02
Payer: COMMERCIAL

## 2024-01-02 DIAGNOSIS — R10.11 RUQ PAIN: ICD-10-CM

## 2024-01-02 PROCEDURE — 76705 ECHO EXAM OF ABDOMEN: CPT

## 2024-01-09 ENCOUNTER — TELEPHONE (OUTPATIENT)
Dept: HEALTH INFORMATION MANAGEMENT | Facility: OTHER | Age: 39
End: 2024-01-09
Payer: COMMERCIAL

## 2024-01-09 DIAGNOSIS — H71.90 CHOLESTEATOMA, UNSPECIFIED LATERALITY: ICD-10-CM

## 2024-01-09 NOTE — TELEPHONE ENCOUNTER
1. Caller Name: Mariaelena Jett                        Call Back Number: [phone]        How would the patient prefer to be contacted with a response: MyChart message    2. SPECIFIC Action To Be Taken: Referral pending, please sign.    3. Diagnosis/Clinical Reason for Request:  follow up with ENT for prior cholesteatoma    4. Specialty & Provider Name/Lab/Imaging Location: [facility]    5. Is appointment scheduled for requested order/referral: no    Patient was informed they will receive a return phone call from the office ONLY if there are any questions before processing their request. Advised to call back if they haven't received a call from the referral department in 5 days.      MyChart Message from patient:   JAROCHO JETT would like to request a referral.  Reason: On going follow up  Requested provider: Augusta Sewell  Comment:  I need a follow up with ENT for prior cholesteatoma      Per chart review: cholesteatoma is noted on Problem List

## 2024-01-10 DIAGNOSIS — E66.3 OVERWEIGHT (BMI 25.0-29.9): ICD-10-CM

## 2024-01-10 RX ORDER — PHENTERMINE HYDROCHLORIDE 15 MG/1
30 CAPSULE ORAL EVERY MORNING
Qty: 120 CAPSULE | Refills: 0 | Status: SHIPPED
Start: 2024-01-10 | End: 2024-02-12

## 2024-02-08 DIAGNOSIS — F51.01 PRIMARY INSOMNIA: ICD-10-CM

## 2024-02-08 DIAGNOSIS — F41.9 ANXIETY: ICD-10-CM

## 2024-02-08 RX ORDER — ALPRAZOLAM 1 MG/1
1 TABLET ORAL NIGHTLY PRN
Qty: 30 TABLET | Refills: 0 | Status: SHIPPED | OUTPATIENT
Start: 2024-02-14 | End: 2024-03-11 | Stop reason: SDUPTHER

## 2024-02-12 DIAGNOSIS — E66.3 OVERWEIGHT (BMI 25.0-29.9): ICD-10-CM

## 2024-02-12 RX ORDER — PHENTERMINE HYDROCHLORIDE 37.5 MG/1
37.5 CAPSULE ORAL EVERY MORNING
Qty: 30 CAPSULE | Refills: 0 | Status: SHIPPED | OUTPATIENT
Start: 2024-02-12 | End: 2024-03-13

## 2024-02-26 DIAGNOSIS — Z30.430 ENCOUNTER FOR INSERTION OF MIRENA IUD: ICD-10-CM

## 2024-03-11 ENCOUNTER — OFFICE VISIT (OUTPATIENT)
Dept: MEDICAL GROUP | Facility: PHYSICIAN GROUP | Age: 39
End: 2024-03-11
Payer: COMMERCIAL

## 2024-03-11 VITALS
HEART RATE: 60 BPM | WEIGHT: 153.4 LBS | BODY MASS INDEX: 24.65 KG/M2 | OXYGEN SATURATION: 99 % | TEMPERATURE: 98.1 F | DIASTOLIC BLOOD PRESSURE: 60 MMHG | HEIGHT: 66 IN | SYSTOLIC BLOOD PRESSURE: 110 MMHG

## 2024-03-11 DIAGNOSIS — F41.9 ANXIETY: ICD-10-CM

## 2024-03-11 DIAGNOSIS — F51.01 PRIMARY INSOMNIA: ICD-10-CM

## 2024-03-11 PROCEDURE — 3078F DIAST BP <80 MM HG: CPT

## 2024-03-11 PROCEDURE — 99213 OFFICE O/P EST LOW 20 MIN: CPT

## 2024-03-11 PROCEDURE — 3074F SYST BP LT 130 MM HG: CPT

## 2024-03-11 RX ORDER — ALPRAZOLAM 1 MG/1
1 TABLET ORAL NIGHTLY PRN
Qty: 30 TABLET | Refills: 0 | Status: SHIPPED | OUTPATIENT
Start: 2024-05-14 | End: 2024-06-13

## 2024-03-11 RX ORDER — ALPRAZOLAM 1 MG/1
1 TABLET ORAL NIGHTLY PRN
Qty: 30 TABLET | Refills: 0 | Status: SHIPPED | OUTPATIENT
Start: 2024-04-14 | End: 2024-05-14

## 2024-03-11 RX ORDER — ALPRAZOLAM 1 MG/1
1 TABLET ORAL NIGHTLY PRN
Qty: 30 TABLET | Refills: 0 | Status: SHIPPED | OUTPATIENT
Start: 2024-03-15 | End: 2024-04-14

## 2024-03-11 ASSESSMENT — ENCOUNTER SYMPTOMS
SHORTNESS OF BREATH: 0
BLURRED VISION: 0
DIZZINESS: 0
COUGH: 0
DIARRHEA: 0
FEVER: 0
CHILLS: 0
HEADACHES: 0
CONSTIPATION: 0
VOMITING: 0
WEIGHT LOSS: 0
MYALGIAS: 0
WEAKNESS: 0
NAUSEA: 0
ABDOMINAL PAIN: 0

## 2024-03-11 ASSESSMENT — PATIENT HEALTH QUESTIONNAIRE - PHQ9: CLINICAL INTERPRETATION OF PHQ2 SCORE: 0

## 2024-03-11 ASSESSMENT — FIBROSIS 4 INDEX: FIB4 SCORE: 1.08

## 2024-03-11 NOTE — PROGRESS NOTES
"Verbal consent was acquired by the patient to use [employer] ambient listening note generation during this visit  Subjective:     CC: Med refill    HPI:   JAROCHO presents today with  History of Present Illness        Problem   Primary Insomnia    Chronic condition for which patient takes alprazolam 1 mg nightly if needed for insomnia.  She has done well on this medication without reported side effects.  Practices sleep hygiene as well.           ROS:  Review of Systems   Constitutional:  Negative for chills, fever, malaise/fatigue and weight loss.   Eyes:  Negative for blurred vision.   Respiratory:  Negative for cough and shortness of breath.    Cardiovascular:  Negative for chest pain.   Gastrointestinal:  Negative for abdominal pain, constipation, diarrhea, nausea and vomiting.   Musculoskeletal:  Negative for myalgias.   Neurological:  Negative for dizziness, weakness and headaches.       Objective:     Exam:  /60 (BP Location: Left arm, Patient Position: Sitting, BP Cuff Size: Adult)   Pulse 60   Temp 36.7 °C (98.1 °F) (Temporal)   Ht 1.676 m (5' 6\")   Wt 69.6 kg (153 lb 6.4 oz)   SpO2 99%   BMI 24.76 kg/m²  Body mass index is 24.76 kg/m².    Physical Exam  Constitutional:       General: She is not in acute distress.     Appearance: Normal appearance. She is not ill-appearing or toxic-appearing.   HENT:      Head: Normocephalic.   Eyes:      Conjunctiva/sclera: Conjunctivae normal.   Pulmonary:      Effort: Pulmonary effort is normal.   Skin:     General: Skin is warm and dry.   Neurological:      General: No focal deficit present.      Mental Status: She is alert and oriented to person, place, and time.   Psychiatric:         Mood and Affect: Mood normal.         Behavior: Behavior normal.         Labs:     Assessment & Plan: Medical Decision Making     38 y.o. female with the following -   Assessment & Plan      Problem List Items Addressed This Visit       Primary insomnia     Controlled " "substance visit today for Xanax refill.  Patient has been stable on Xanax 1 mg daily as needed for insomnia and anxiety.   shows no abnormal prescribing or filling behavior.  Controlled substance agreement up-to-date.  Urine drug screen up-to-date  -Xanax 1 mg as needed daily refilled, risk benefits and side effects of this medication discussed  -Controlled substance discussed with client. Client agrees to abide by controlled substance contract.   -A Risk of Abuse assessment for benzodiazepine abuse was previously preformed and documented in the past medical history. The treatment plan was reviewed and discussed with the patient. The pharmacy monitoring report was requested and reviewed.  Patient is appropriate for refill of this medication.  -Patient informed no medication adjustments will be made to titrate up or add additional narcotics, benzodiazepines or other controlled substances to this regimen.  Referral to pain management or behavioral health will be made as appropriate.    Patient understands this prescription is a controlled substance which is potentially habit-forming and its use is regulated by the [place]. We also discussed the new \"black box\" warning regarding the lethal combination of opioids and benzodiazepines. Refills are subject to terms of a controlled substance agreement and patient has an updated one on file. Most recent UDS is appropriate. Any refill requires an office visit. Narcotics, benzodiazepines, stimulants, and sleep aids may have adverse effects and the risks of addiction, accidental overdose and death were emphasized. Provided prescriptions for the next Xanax for three months.           Relevant Medications    ALPRAZolam (XANAX) 1 MG Tab (Start on 3/15/2024)    ALPRAZolam (XANAX) 1 MG Tab (Start on 4/14/2024)    ALPRAZolam (XANAX) 1 MG Tab (Start on 5/14/2024)     Other Visit Diagnoses       Anxiety        Relevant Medications    ALPRAZolam (XANAX) 1 MG Tab (Start on 3/15/2024)    " ALPRAZolam (XANAX) 1 MG Tab (Start on 4/14/2024)    ALPRAZolam (XANAX) 1 MG Tab (Start on 5/14/2024)            Differential diagnosis, natural history, supportive care, and indications for immediate follow-up discussed.  Shared decision making approach utilized, and patient is amendable with plan of care.  Patient understands to return to clinic or go to the emergency department if symptoms worsen. All questions and concerns addressed to the best of my knowledge.    Return in about 3 months (around 6/11/2024).    Please note that this dictation was created using voice recognition software. I have made every reasonable attempt to correct obvious errors, but I expect that there are errors of grammar and possibly content that I did not discover before finalizing the note.

## 2024-06-03 ENCOUNTER — OFFICE VISIT (OUTPATIENT)
Dept: MEDICAL GROUP | Facility: PHYSICIAN GROUP | Age: 39
End: 2024-06-03
Payer: COMMERCIAL

## 2024-06-03 VITALS
SYSTOLIC BLOOD PRESSURE: 96 MMHG | TEMPERATURE: 99.3 F | DIASTOLIC BLOOD PRESSURE: 60 MMHG | HEART RATE: 60 BPM | OXYGEN SATURATION: 100 % | BODY MASS INDEX: 24.01 KG/M2 | WEIGHT: 149.4 LBS | HEIGHT: 66 IN

## 2024-06-03 DIAGNOSIS — F41.9 ANXIETY: ICD-10-CM

## 2024-06-03 DIAGNOSIS — F51.01 PRIMARY INSOMNIA: ICD-10-CM

## 2024-06-03 DIAGNOSIS — R26.89 POOR BALANCE OF BODY WEIGHT: ICD-10-CM

## 2024-06-03 DIAGNOSIS — E66.3 OVERWEIGHT (BMI 25.0-29.9): ICD-10-CM

## 2024-06-03 PROCEDURE — 3074F SYST BP LT 130 MM HG: CPT

## 2024-06-03 PROCEDURE — 3078F DIAST BP <80 MM HG: CPT

## 2024-06-03 PROCEDURE — 99214 OFFICE O/P EST MOD 30 MIN: CPT

## 2024-06-03 RX ORDER — ALPRAZOLAM 1 MG/1
1 TABLET ORAL NIGHTLY PRN
Qty: 30 TABLET | Refills: 2 | Status: SHIPPED | OUTPATIENT
Start: 2024-07-03 | End: 2024-10-01

## 2024-06-03 RX ORDER — PHENTERMINE HYDROCHLORIDE 30 MG/1
30 CAPSULE ORAL EVERY MORNING
Qty: 30 CAPSULE | Refills: 2 | Status: SHIPPED | OUTPATIENT
Start: 2024-06-03 | End: 2024-09-01

## 2024-06-03 ASSESSMENT — ENCOUNTER SYMPTOMS
MYALGIAS: 0
DIZZINESS: 0
BLURRED VISION: 0
ABDOMINAL PAIN: 0
HEADACHES: 0
FEVER: 0
CHILLS: 0
DIARRHEA: 0
WEAKNESS: 0
NAUSEA: 0
VOMITING: 0
SHORTNESS OF BREATH: 0
CONSTIPATION: 0
WEIGHT LOSS: 0
COUGH: 0

## 2024-06-03 ASSESSMENT — FIBROSIS 4 INDEX: FIB4 SCORE: 1.08

## 2024-06-03 NOTE — PROGRESS NOTES
"Verbal consent was acquired by the patient to use [employer] ambient listening note generation during this visit  Subjective:     CC: Med refill    HPI:   Dorothy presents today with  History of Present Illness  The patient presents via virtual visit for a medication refill.    The patient recently procured her last dose of alprazolam and is seeking a refill. Her BMI has shown a decrease. She is currently on a 30-mg regimen of phentermine, which she reports has positively impacted her appetite. She denies experiencing any jitteriness and has made dietary modifications, including the reduction in her coffee consumption.      Problem   Poor Balance of Body Weight    Chronic condition-improving  - patient weight today is 149 down from 155 lbs last visit in March 2023  - patient eats healthy and remains active/exercises regularly  -On phentermine 30 mg and tolerating well, no side effects-requests refill     Primary Insomnia    Chronic condition for which patient takes alprazolam 1 mg nightly if needed for insomnia.  She has done well on this medication without reported side effects.  Practices sleep hygiene as well.           ROS:  Review of Systems   Constitutional:  Negative for chills, fever, malaise/fatigue and weight loss.   Eyes:  Negative for blurred vision.   Respiratory:  Negative for cough and shortness of breath.    Cardiovascular:  Negative for chest pain.   Gastrointestinal:  Negative for abdominal pain, constipation, diarrhea, nausea and vomiting.   Musculoskeletal:  Negative for myalgias.   Neurological:  Negative for dizziness, weakness and headaches.       Objective:     Exam:  BP 96/60 (BP Location: Left arm, Patient Position: Sitting, BP Cuff Size: Adult)   Pulse 60   Temp 37.4 °C (99.3 °F) (Temporal)   Ht 1.676 m (5' 6\")   Wt 67.8 kg (149 lb 6.4 oz)   SpO2 100%   BMI 24.11 kg/m²  Body mass index is 24.11 kg/m².    Physical Exam  Constitutional:       General: She is not in acute distress.   " "  Appearance: Normal appearance. She is not ill-appearing or toxic-appearing.   HENT:      Head: Normocephalic.   Eyes:      Conjunctiva/sclera: Conjunctivae normal.   Pulmonary:      Effort: Pulmonary effort is normal.   Skin:     General: Skin is warm and dry.   Neurological:      General: No focal deficit present.      Mental Status: She is alert and oriented to person, place, and time.   Psychiatric:         Mood and Affect: Mood normal.         Behavior: Behavior normal.         Labs:   Lab Results   Component Value Date/Time    CHOLSTRLTOT 176 12/05/2023 06:28 AM    LDL 85 12/05/2023 06:28 AM    HDL 78 12/05/2023 06:28 AM    TRIGLYCERIDE 66 12/05/2023 06:28 AM       Lab Results   Component Value Date/Time    SODIUM 137 12/05/2023 06:28 AM    POTASSIUM 4.7 12/05/2023 06:28 AM    CHLORIDE 101 12/05/2023 06:28 AM    CO2 24 12/05/2023 06:28 AM    GLUCOSE 96 12/05/2023 06:28 AM    BUN 17 12/05/2023 06:28 AM    CREATININE 0.90 12/05/2023 06:28 AM     Lab Results   Component Value Date/Time    ALKPHOSPHAT 56 12/05/2023 06:28 AM    ASTSGOT 33 12/05/2023 06:28 AM    ALTSGPT 25 12/05/2023 06:28 AM    TBILIRUBIN 0.3 12/05/2023 06:28 AM      Lab Results   Component Value Date/Time    HBA1C 5.2 12/05/2023 06:28 AM    HBA1C 5.4 12/05/2020 12:52 PM     No results found for: \"TSH\"  No results found for: \"FREET4\"  No results found for: \"CBC\"      Assessment & Plan: Medical Decision Making     38 y.o. female with the following -   Assessment & Plan  1. Medication refill.  The patient's alprazolam prescription was renewed on 6/3/2024, with the next refill scheduled for 7/3/2024    2. Weight management.  The patient's BMI has shown a decrease. A prescription for phentermine 30 mg was issued.    Problem List Items Addressed This Visit       Primary insomnia     Controlled substance visit today for Xanax refill.  Patient has been stable on Xanax 1 mg daily as needed for insomnia and anxiety.   shows no abnormal prescribing or " "filling behavior.  Controlled substance agreement up-to-date.  Urine drug screen up-to-date  -Xanax 1 mg as needed daily refilled, risk benefits and side effects of this medication discussed  -Controlled substance discussed with client. Client agrees to abide by controlled substance contract.   -A Risk of Abuse assessment for benzodiazepine abuse was previously preformed and documented in the past medical history. The treatment plan was reviewed and discussed with the patient. The pharmacy monitoring report was requested and reviewed.  Patient is appropriate for refill of this medication.  -Patient informed no medication adjustments will be made to titrate up or add additional narcotics, benzodiazepines or other controlled substances to this regimen.  Referral to pain management or behavioral health will be made as appropriate.    Patient understands this prescription is a controlled substance which is potentially habit-forming and its use is regulated by the [place]. We also discussed the new \"black box\" warning regarding the lethal combination of opioids and benzodiazepines. Refills are subject to terms of a controlled substance agreement and patient has an updated one on file. Most recent UDS is appropriate. Any refill requires an office visit. Narcotics, benzodiazepines, stimulants, and sleep aids may have adverse effects and the risks of addiction, accidental overdose and death were emphasized. Provided prescriptions for the next Xanax for three months.           Relevant Medications    ALPRAZolam (XANAX) 1 MG Tab (Start on 7/3/2024)    Poor balance of body weight     Chronic condition improving with BMI now within normal range at 24.11 and weight at 149.  She would like to maintain her weight and be able to take phentermine if needed for appetite control.  She has no reported side effects of medication.  PDMP checked and patient is appropriate for refill.  Urine drug screen and controlled substance agreement " up-to-date.  Recommend continue consistent exercise and dietary efforts.         Relevant Medications    phentermine 30 MG capsule     Other Visit Diagnoses       Anxiety        Relevant Medications    ALPRAZolam (XANAX) 1 MG Tab (Start on 7/3/2024)            Differential diagnosis, natural history, supportive care, and indications for immediate follow-up discussed.  Shared decision making approach utilized, and patient is amendable with plan of care.  Patient understands to return to clinic or go to the emergency department if symptoms worsen. All questions and concerns addressed to the best of my knowledge.    Return in about 3 months (around 9/3/2024).    Please note that this dictation was created using voice recognition software. I have made every reasonable attempt to correct obvious errors, but I expect that there are errors of grammar and possibly content that I did not discover before finalizing the note.

## 2024-06-03 NOTE — ASSESSMENT & PLAN NOTE
Chronic condition improving with BMI now within normal range at 24.11 and weight at 149.  She would like to maintain her weight and be able to take phentermine if needed for appetite control.  She has no reported side effects of medication.  PDMP checked and patient is appropriate for refill.  Urine drug screen and controlled substance agreement up-to-date.  Recommend continue consistent exercise and dietary efforts.

## 2024-06-04 ENCOUNTER — RESEARCH ENCOUNTER (OUTPATIENT)
Dept: RESEARCH | Facility: MEDICAL CENTER | Age: 39
End: 2024-06-04
Payer: COMMERCIAL

## 2024-08-07 ENCOUNTER — APPOINTMENT (OUTPATIENT)
Dept: MEDICAL GROUP | Facility: PHYSICIAN GROUP | Age: 39
End: 2024-08-07
Payer: COMMERCIAL

## 2024-09-09 RX ORDER — NORETHINDRONE ACETATE AND ETHINYL ESTRADIOL .02; 1 MG/1; MG/1
1 TABLET ORAL DAILY
Qty: 84 TABLET | Refills: 3 | Status: SHIPPED | OUTPATIENT
Start: 2024-09-09 | End: 2024-09-12 | Stop reason: SDUPTHER

## 2024-09-09 NOTE — TELEPHONE ENCOUNTER
Received request via: Pharmacy    Was the patient seen in the last year in this department? Yes    Does the patient have an active prescription (recently filled or refills available) for medication(s) requested? No    [facility]    Does the patient have residential Plus and need 100-day supply? (This applies to ALL medications) Patient does not have SCP

## 2024-09-11 DIAGNOSIS — F43.9 STRESS: ICD-10-CM

## 2024-09-11 DIAGNOSIS — F41.8 SITUATIONAL ANXIETY: ICD-10-CM

## 2024-09-11 DIAGNOSIS — F43.21 SITUATIONAL DEPRESSION: ICD-10-CM

## 2024-09-11 DIAGNOSIS — F51.01 PRIMARY INSOMNIA: ICD-10-CM

## 2024-09-11 NOTE — TELEPHONE ENCOUNTER
Received request via: Pharmacy    Was the patient seen in the last year in this department? Yes    Does the patient have an active prescription (recently filled or refills available) for medication(s) requested? No    Pharmacy Name: [id number] - [place], [place] - [address]     Does the patient have [facility]PC Plus and need 100-day supply? (This applies to ALL medications) Patient does not have SCP

## 2024-09-12 RX ORDER — NORETHINDRONE ACETATE AND ETHINYL ESTRADIOL .02; 1 MG/1; MG/1
1 TABLET ORAL DAILY
Qty: 84 TABLET | Refills: 3 | Status: SHIPPED | OUTPATIENT
Start: 2024-09-12

## 2024-09-12 NOTE — TELEPHONE ENCOUNTER
Received request via: Patient      Patient comment: I am not sure how my Pharmacy got switched. I may have selected the wrong option. However, I need this sent to [facility] and not [facility]. Thanks.

## 2024-09-30 ENCOUNTER — APPOINTMENT (OUTPATIENT)
Dept: OCCUPATIONAL MEDICINE | Facility: CLINIC | Age: 39
End: 2024-09-30

## 2024-09-30 ENCOUNTER — APPOINTMENT (OUTPATIENT)
Dept: MEDICAL GROUP | Facility: PHYSICIAN GROUP | Age: 39
End: 2024-09-30
Payer: COMMERCIAL

## 2024-09-30 DIAGNOSIS — Z23 NEED FOR VACCINATION: Primary | ICD-10-CM

## 2024-09-30 PROCEDURE — 90656 IIV3 VACC NO PRSV 0.5 ML IM: CPT | Performed by: NURSE PRACTITIONER

## 2024-10-08 ENCOUNTER — APPOINTMENT (OUTPATIENT)
Dept: MEDICAL GROUP | Facility: PHYSICIAN GROUP | Age: 39
End: 2024-10-08
Payer: COMMERCIAL

## 2024-10-08 VITALS
WEIGHT: 150.2 LBS | BODY MASS INDEX: 24.14 KG/M2 | TEMPERATURE: 97.8 F | DIASTOLIC BLOOD PRESSURE: 60 MMHG | HEIGHT: 66 IN | SYSTOLIC BLOOD PRESSURE: 100 MMHG | OXYGEN SATURATION: 100 % | HEART RATE: 64 BPM

## 2024-10-08 DIAGNOSIS — Z23 NEED FOR VACCINATION: ICD-10-CM

## 2024-10-08 DIAGNOSIS — F41.9 ANXIETY: ICD-10-CM

## 2024-10-08 PROCEDURE — 99214 OFFICE O/P EST MOD 30 MIN: CPT | Mod: 25

## 2024-10-08 PROCEDURE — 90471 IMMUNIZATION ADMIN: CPT

## 2024-10-08 PROCEDURE — 3078F DIAST BP <80 MM HG: CPT

## 2024-10-08 PROCEDURE — 90715 TDAP VACCINE 7 YRS/> IM: CPT

## 2024-10-08 PROCEDURE — 3074F SYST BP LT 130 MM HG: CPT

## 2024-10-08 RX ORDER — ALPRAZOLAM 2 MG/1
2 TABLET ORAL NIGHTLY PRN
Qty: 30 TABLET | Refills: 2 | Status: SHIPPED | OUTPATIENT
Start: 2024-10-08 | End: 2025-01-06

## 2024-10-08 ASSESSMENT — ENCOUNTER SYMPTOMS
VOMITING: 0
DIARRHEA: 0
INSOMNIA: 1
FEVER: 0
MYALGIAS: 0
CHILLS: 0
NERVOUS/ANXIOUS: 1
DIZZINESS: 0
HEADACHES: 0
ABDOMINAL PAIN: 0
NAUSEA: 0
SHORTNESS OF BREATH: 0
CONSTIPATION: 0
WEIGHT LOSS: 0
WEAKNESS: 0
BLURRED VISION: 0
COUGH: 0

## 2024-10-08 ASSESSMENT — FIBROSIS 4 INDEX: FIB4 SCORE: 1.08

## 2024-10-16 ENCOUNTER — PATIENT MESSAGE (OUTPATIENT)
Dept: MEDICAL GROUP | Facility: PHYSICIAN GROUP | Age: 39
End: 2024-10-16
Payer: COMMERCIAL

## 2024-10-16 RX ORDER — EPINEPHRINE 0.3 MG/.3ML
INJECTION SUBCUTANEOUS
Qty: 1 EACH | Refills: 0 | Status: SHIPPED | OUTPATIENT
Start: 2024-10-16

## 2024-12-10 DIAGNOSIS — Z11.3 SCREENING EXAMINATION FOR SEXUALLY TRANSMITTED DISEASE: ICD-10-CM

## 2024-12-10 DIAGNOSIS — Z51.81 THERAPEUTIC DRUG MONITORING: ICD-10-CM

## 2024-12-10 DIAGNOSIS — Z79.899 CHRONIC USE OF BENZODIAZEPINE FOR THERAPEUTIC PURPOSE: ICD-10-CM

## 2024-12-10 DIAGNOSIS — Z00.00 HEALTHCARE MAINTENANCE: ICD-10-CM

## 2024-12-30 ENCOUNTER — OFFICE VISIT (OUTPATIENT)
Dept: MEDICAL GROUP | Facility: PHYSICIAN GROUP | Age: 39
End: 2024-12-30
Payer: COMMERCIAL

## 2024-12-30 ENCOUNTER — HOSPITAL ENCOUNTER (OUTPATIENT)
Facility: MEDICAL CENTER | Age: 39
End: 2024-12-30
Payer: COMMERCIAL

## 2024-12-30 VITALS
HEIGHT: 66 IN | HEART RATE: 58 BPM | OXYGEN SATURATION: 95 % | WEIGHT: 155 LBS | BODY MASS INDEX: 24.91 KG/M2 | SYSTOLIC BLOOD PRESSURE: 120 MMHG | TEMPERATURE: 97.3 F | DIASTOLIC BLOOD PRESSURE: 60 MMHG

## 2024-12-30 DIAGNOSIS — Z51.81 THERAPEUTIC DRUG MONITORING: ICD-10-CM

## 2024-12-30 DIAGNOSIS — F41.9 ANXIETY: ICD-10-CM

## 2024-12-30 PROCEDURE — G0481 DRUG TEST DEF 8-14 CLASSES: HCPCS

## 2024-12-30 RX ORDER — ALPRAZOLAM 2 MG/1
2 TABLET ORAL NIGHTLY PRN
Qty: 30 TABLET | Refills: 2 | Status: SHIPPED | OUTPATIENT
Start: 2025-01-14 | End: 2024-12-30 | Stop reason: SDUPTHER

## 2024-12-30 RX ORDER — ALPRAZOLAM 2 MG/1
2 TABLET ORAL NIGHTLY PRN
Qty: 30 TABLET | Refills: 2 | Status: SHIPPED | OUTPATIENT
Start: 2025-01-12 | End: 2025-04-12

## 2024-12-30 ASSESSMENT — ENCOUNTER SYMPTOMS
BLURRED VISION: 0
NAUSEA: 0
FEVER: 0
WEIGHT LOSS: 0
DIARRHEA: 0
ABDOMINAL PAIN: 0
CHILLS: 0
COUGH: 0
DIZZINESS: 0
WEAKNESS: 0
SHORTNESS OF BREATH: 0
HEADACHES: 0
CONSTIPATION: 0
VOMITING: 0
MYALGIAS: 0

## 2024-12-30 ASSESSMENT — FIBROSIS 4 INDEX: FIB4 SCORE: 1.1

## 2024-12-31 NOTE — PROGRESS NOTES
"Verbal consent was acquired by the patient to use [employer] ambient listening note generation during this visit  Subjective:     CC:  Diagnoses of Therapeutic drug monitoring and Anxiety were pertinent to this visit.    HISTORY OF THE PRESENT ILLNESS: Patient is a 39 y.o. female.   No problems updated.    History of Present Illness  The patient presents for medication refill.    She has been on a consistent work schedule, with her ninth day of work being tomorrow. She is planning a trip to [facility] on 01/13/2025 and intends to complete all necessary lab tests prior to her departure. She has also scheduled a COVID-19 booster vaccine at her local pharmacy. Recent lab results, including A1c and cholesterol levels, were within normal limits. Her HDL level was reported as 80, but total cholesterol was normal. She reports no family history of heart disease, except for an uncle who had severe heart disease. Needs refill for xanax for anxiety.     FAMILY HISTORY  Her uncle had bad heart disease.    MEDICATIONS  Xanax    ROS:   Review of Systems   Constitutional:  Negative for chills, fever, malaise/fatigue and weight loss.   Eyes:  Negative for blurred vision.   Respiratory:  Negative for cough and shortness of breath.    Cardiovascular:  Negative for chest pain.   Gastrointestinal:  Negative for abdominal pain, constipation, diarrhea, nausea and vomiting.   Musculoskeletal:  Negative for myalgias.   Neurological:  Negative for dizziness, weakness and headaches.         Objective:     Exam: /60 (BP Location: Left arm, Patient Position: Sitting, BP Cuff Size: Adult)   Pulse (!) 58   Temp 36.3 °C (97.3 °F) (Temporal)   Ht 1.676 m (5' 6\")   Wt 70.3 kg (155 lb)   SpO2 95%  Body mass index is 25.02 kg/m².    Physical Exam  Constitutional:       General: She is not in acute distress.     Appearance: Normal appearance. She is not ill-appearing or toxic-appearing.   HENT:      Head: Normocephalic.   Eyes:      " "Conjunctiva/sclera: Conjunctivae normal.   Pulmonary:      Effort: Pulmonary effort is normal.   Skin:     General: Skin is warm and dry.   Neurological:      General: No focal deficit present.      Mental Status: She is alert and oriented to person, place, and time.   Psychiatric:         Mood and Affect: Mood normal.         Behavior: Behavior normal.           Labs:     Assessment & Plan: Medical Decision Making   39 y.o. female with the following -    Assessment & Plan  1. Medication refill.  A prescription for Xanax 2 mg nightly if needed for anxiety has been issued, with a note to the pharmacist requesting an early refill on 01/12/2025 due to her travel plans.   Patient has been stable on Xanax 2 mg nightly.   shows no abnormal prescribing or filling behavior.  Controlled substance agreement up-to-date.  Urine drug screen up-to-date.  - xanax refilled, risk benefits and side effects of this medication discussed   -Urine toxicology screen obtained in clinic  -Controlled substance abuse agreement contract signed and scanned into patient's chart  -Controlled substance discussed with client. Client agrees to abide by controlled substance contract.   -A Risk of Abuse assessment for opioid abuse was previously preformed and documented in the past medical history. The treatment plan was reviewed and discussed with the patient. The pharmacy monitoring report was requested and reviewed.  Patient is appropriate for refill of this medication.  -Patient informed no medication adjustments will be made to titrate up or add additional narcotics, benzodiazepines or other controlled substances to this regimen.  Referral to pain management or behavioral health will be made as appropriate.    Patient understands this prescription is a controlled substance which is potentially habit-forming and its use is regulated by the [place]. We also discussed the new \"black box\" warning regarding the lethal combination of opioids and " benzodiazepines. Refills are subject to terms of a controlled substance agreement and patient has an updated one on file. Most recent UDS is appropriate. Any refill requires an office visit. Narcotics, benzodiazepines, stimulants, and sleep aids may have adverse effects and the risks of addiction, accidental overdose and death were emphasized. Provided prescriptions for the next three months.        Problem List Items Addressed This Visit    None  Visit Diagnoses       Therapeutic drug monitoring        Relevant Orders    Pain Management Screen    Anxiety        Relevant Medications    alprazolam (XANAX) 2 MG tablet (Start on 1/12/2025)            Differential diagnosis, natural history, supportive care, and indications for immediate follow-up discussed.  Shared decision making approach was utilized, and patient is amendable with plan of care.  Patient understands to return to clinic or go to the emergency department if symptoms worsen. All questions and concerns addressed to the best of my knowledge.      Return if symptoms worsen or fail to improve.    Please note that this dictation was created using voice recognition software. I have made every reasonable attempt to correct obvious errors, but I expect that there are errors of grammar and possibly content that I did not discover before finalizing the note.   Detail Level: Detailed

## 2025-01-03 LAB
1OH-MIDAZOLAM UR QL SCN: NOT DETECTED
6MAM UR QL: NOT DETECTED
7AMINOCLONAZEPAM UR QL: NOT DETECTED
A-OH ALPRAZ UR QL: PRESENT
ALPRAZ UR QL: NOT DETECTED
AMPHET UR QL SCN: NOT DETECTED
ANNOTATION COMMENT IMP: NORMAL
BARBITURATES UR QL: NEGATIVE
BUPRENORPHINE UR QL: NOT DETECTED
BZE UR QL: NEGATIVE
CARBOXYTHC UR QL: NEGATIVE
CARISOPRODOL UR QL: NEGATIVE
CLONAZEPAM UR QL: NOT DETECTED
CODEINE UR QL: NOT DETECTED
CREAT UR-MCNC: <20 MG/DL (ref 20–400)
DIAZEPAM UR QL: NOT DETECTED
ETHYL GLUCURONIDE UR QL: NEGATIVE
FENTANYL UR QL: NOT DETECTED
GABAPENTIN UR QL CFM: NOT DETECTED
HYDROCODONE UR QL: NOT DETECTED
HYDROMORPHONE UR QL: NOT DETECTED
LORAZEPAM UR QL: NOT DETECTED
MDA UR QL: NOT DETECTED
MDEA UR QL: NOT DETECTED
MDMA UR QL: NOT DETECTED
ME-PHENIDATE UR QL: NOT DETECTED
METHADONE UR QL: NEGATIVE
METHAMPHET UR QL: NOT DETECTED
MIDAZOLAM UR QL SCN: NOT DETECTED
MORPHINE UR QL: NOT DETECTED
NALOXONE UR QL CFM: NOT DETECTED
NORBUPRENORPHINE UR QL CFM: NOT DETECTED
NORDIAZEPAM UR QL: NOT DETECTED
NORFENTANYL UR QL: NOT DETECTED
NORHYDROCODONE UR QL CFM: NOT DETECTED
NORMEPERIDINE UR QL CFM: NOT DETECTED
NOROXYCODONE UR QL CFM: NOT DETECTED
NOROXYMORPHONE UR QL SCN: NOT DETECTED
OXAZEPAM UR QL: NOT DETECTED
OXYCODONE UR QL: NOT DETECTED
OXYMORPHONE UR QL: NOT DETECTED
PATHOLOGY STUDY: NORMAL
PCP UR QL: NEGATIVE
PHENTERMINE UR QL: NOT DETECTED
PREGABALIN UR QL CFM: NOT DETECTED
SERVICE CMNT-IMP: NORMAL
TAPENTADOL UR QL SCN: NOT DETECTED
TAPENTADOL UR QL SCN: NOT DETECTED
TEMAZEPAM UR QL: NOT DETECTED
TRAMADOL UR QL: NEGATIVE
ZOLPIDEM PHENYL-4-CARB UR QL SCN: NOT DETECTED
ZOLPIDEM UR QL: NOT DETECTED

## 2025-01-06 DIAGNOSIS — Z11.3 SCREENING EXAMINATION FOR SEXUALLY TRANSMITTED DISEASE: ICD-10-CM

## 2025-01-07 ENCOUNTER — PHARMACY VISIT (OUTPATIENT)
Dept: PHARMACY | Facility: MEDICAL CENTER | Age: 40
End: 2025-01-07
Payer: COMMERCIAL

## 2025-01-07 PROCEDURE — RXMED WILLOW AMBULATORY MEDICATION CHARGE: Performed by: INTERNAL MEDICINE

## 2025-01-07 RX ORDER — COVID-19 VACCINE, MRNA 0.04 MG/.418ML
INJECTION, SUSPENSION INTRAMUSCULAR
Qty: 0.3 ML | Refills: 0 | OUTPATIENT
Start: 2025-01-07

## 2025-01-08 ENCOUNTER — HOSPITAL ENCOUNTER (OUTPATIENT)
Dept: LAB | Facility: MEDICAL CENTER | Age: 40
End: 2025-01-08
Payer: COMMERCIAL

## 2025-01-08 DIAGNOSIS — Z11.3 SCREENING EXAMINATION FOR SEXUALLY TRANSMITTED DISEASE: ICD-10-CM

## 2025-01-08 DIAGNOSIS — Z00.00 HEALTHCARE MAINTENANCE: ICD-10-CM

## 2025-01-08 LAB
ALBUMIN SERPL BCP-MCNC: 4.2 G/DL (ref 3.2–4.9)
ALBUMIN/GLOB SERPL: 1.4 G/DL
ALP SERPL-CCNC: 52 U/L (ref 30–99)
ALT SERPL-CCNC: 15 U/L (ref 2–50)
ANION GAP SERPL CALC-SCNC: 10 MMOL/L (ref 7–16)
AST SERPL-CCNC: 24 U/L (ref 12–45)
BASOPHILS # BLD AUTO: 1 % (ref 0–1.8)
BASOPHILS # BLD: 0.06 K/UL (ref 0–0.12)
BILIRUB SERPL-MCNC: 0.3 MG/DL (ref 0.1–1.5)
BUN SERPL-MCNC: 14 MG/DL (ref 8–22)
CALCIUM ALBUM COR SERPL-MCNC: 9.2 MG/DL (ref 8.5–10.5)
CALCIUM SERPL-MCNC: 9.4 MG/DL (ref 8.5–10.5)
CHLORIDE SERPL-SCNC: 102 MMOL/L (ref 96–112)
CHOLEST SERPL-MCNC: 156 MG/DL (ref 100–199)
CO2 SERPL-SCNC: 25 MMOL/L (ref 20–33)
CREAT SERPL-MCNC: 0.94 MG/DL (ref 0.5–1.4)
EOSINOPHIL # BLD AUTO: 0.16 K/UL (ref 0–0.51)
EOSINOPHIL NFR BLD: 2.7 % (ref 0–6.9)
ERYTHROCYTE [DISTWIDTH] IN BLOOD BY AUTOMATED COUNT: 42.9 FL (ref 35.9–50)
GFR SERPLBLD CREATININE-BSD FMLA CKD-EPI: 79 ML/MIN/1.73 M 2
GLOBULIN SER CALC-MCNC: 3 G/DL (ref 1.9–3.5)
GLUCOSE SERPL-MCNC: 127 MG/DL (ref 65–99)
HBV CORE AB SERPL QL IA: NONREACTIVE
HBV SURFACE AB SERPL IA-ACNC: 331 MIU/ML (ref 0–10)
HBV SURFACE AG SER QL: NORMAL
HCT VFR BLD AUTO: 39.8 % (ref 37–47)
HCV AB SER QL: NORMAL
HDLC SERPL-MCNC: 81 MG/DL
HGB BLD-MCNC: 13.4 G/DL (ref 12–16)
HIV 1+2 AB+HIV1 P24 AG SERPL QL IA: NORMAL
IMM GRANULOCYTES # BLD AUTO: 0.02 K/UL (ref 0–0.11)
IMM GRANULOCYTES NFR BLD AUTO: 0.3 % (ref 0–0.9)
LDLC SERPL CALC-MCNC: 53 MG/DL
LYMPHOCYTES # BLD AUTO: 1.87 K/UL (ref 1–4.8)
LYMPHOCYTES NFR BLD: 31.2 % (ref 22–41)
MCH RBC QN AUTO: 32.5 PG (ref 27–33)
MCHC RBC AUTO-ENTMCNC: 33.7 G/DL (ref 32.2–35.5)
MCV RBC AUTO: 96.6 FL (ref 81.4–97.8)
MONOCYTES # BLD AUTO: 0.61 K/UL (ref 0–0.85)
MONOCYTES NFR BLD AUTO: 10.2 % (ref 0–13.4)
NEUTROPHILS # BLD AUTO: 3.27 K/UL (ref 1.82–7.42)
NEUTROPHILS NFR BLD: 54.6 % (ref 44–72)
NRBC # BLD AUTO: 0 K/UL
NRBC BLD-RTO: 0 /100 WBC (ref 0–0.2)
PLATELET # BLD AUTO: 227 K/UL (ref 164–446)
PMV BLD AUTO: 10 FL (ref 9–12.9)
POTASSIUM SERPL-SCNC: 4.4 MMOL/L (ref 3.6–5.5)
PROT SERPL-MCNC: 7.2 G/DL (ref 6–8.2)
RBC # BLD AUTO: 4.12 M/UL (ref 4.2–5.4)
SODIUM SERPL-SCNC: 137 MMOL/L (ref 135–145)
T PALLIDUM AB SER QL IA: NORMAL
TRIGL SERPL-MCNC: 109 MG/DL (ref 0–149)
WBC # BLD AUTO: 6 K/UL (ref 4.8–10.8)

## 2025-01-08 PROCEDURE — 36415 COLL VENOUS BLD VENIPUNCTURE: CPT

## 2025-01-08 PROCEDURE — 86694 HERPES SIMPLEX NES ANTBDY: CPT

## 2025-01-08 PROCEDURE — 87591 N.GONORRHOEAE DNA AMP PROB: CPT

## 2025-01-08 PROCEDURE — 86704 HEP B CORE ANTIBODY TOTAL: CPT

## 2025-01-08 PROCEDURE — 80061 LIPID PANEL: CPT

## 2025-01-08 PROCEDURE — 87389 HIV-1 AG W/HIV-1&-2 AB AG IA: CPT

## 2025-01-08 PROCEDURE — 86803 HEPATITIS C AB TEST: CPT

## 2025-01-08 PROCEDURE — 80053 COMPREHEN METABOLIC PANEL: CPT

## 2025-01-08 PROCEDURE — 85025 COMPLETE CBC W/AUTO DIFF WBC: CPT

## 2025-01-08 PROCEDURE — 86780 TREPONEMA PALLIDUM: CPT

## 2025-01-08 PROCEDURE — 87340 HEPATITIS B SURFACE AG IA: CPT

## 2025-01-08 PROCEDURE — 87491 CHLMYD TRACH DNA AMP PROBE: CPT

## 2025-01-08 PROCEDURE — 86706 HEP B SURFACE ANTIBODY: CPT

## 2025-01-10 LAB — HSV1+2 IGG SER IA-ACNC: 0.3 IV

## 2025-02-04 ENCOUNTER — APPOINTMENT (OUTPATIENT)
Dept: MEDICAL GROUP | Facility: PHYSICIAN GROUP | Age: 40
End: 2025-02-04
Payer: COMMERCIAL

## 2025-02-04 DIAGNOSIS — E66.3 OVERWEIGHT (BMI 25.0-29.9): ICD-10-CM

## 2025-02-04 RX ORDER — PHENTERMINE HYDROCHLORIDE 37.5 MG/1
37.5 TABLET ORAL
Qty: 90 TABLET | Refills: 0 | Status: SHIPPED | OUTPATIENT
Start: 2025-02-04 | End: 2025-05-05

## 2025-02-24 ENCOUNTER — HOSPITAL ENCOUNTER (OUTPATIENT)
Dept: RADIOLOGY | Facility: MEDICAL CENTER | Age: 40
End: 2025-02-24
Payer: COMMERCIAL

## 2025-02-24 DIAGNOSIS — Z12.31 VISIT FOR SCREENING MAMMOGRAM: ICD-10-CM

## 2025-02-24 PROCEDURE — 77067 SCR MAMMO BI INCL CAD: CPT

## 2025-02-26 ENCOUNTER — RESULTS FOLLOW-UP (OUTPATIENT)
Dept: MEDICAL GROUP | Facility: PHYSICIAN GROUP | Age: 40
End: 2025-02-26

## 2025-03-12 ENCOUNTER — APPOINTMENT (OUTPATIENT)
Dept: MEDICAL GROUP | Facility: PHYSICIAN GROUP | Age: 40
End: 2025-03-12
Payer: COMMERCIAL

## 2025-04-16 ENCOUNTER — APPOINTMENT (OUTPATIENT)
Dept: MEDICAL GROUP | Facility: PHYSICIAN GROUP | Age: 40
End: 2025-04-16
Payer: COMMERCIAL

## 2025-04-16 VITALS
BODY MASS INDEX: 23.72 KG/M2 | WEIGHT: 147.6 LBS | SYSTOLIC BLOOD PRESSURE: 100 MMHG | OXYGEN SATURATION: 99 % | HEIGHT: 66 IN | TEMPERATURE: 98.3 F | HEART RATE: 69 BPM | DIASTOLIC BLOOD PRESSURE: 60 MMHG

## 2025-04-16 DIAGNOSIS — F41.8 SITUATIONAL ANXIETY: ICD-10-CM

## 2025-04-16 DIAGNOSIS — B37.9 YEAST INFECTION: ICD-10-CM

## 2025-04-16 DIAGNOSIS — F51.01 PRIMARY INSOMNIA: ICD-10-CM

## 2025-04-16 PROCEDURE — 3078F DIAST BP <80 MM HG: CPT

## 2025-04-16 PROCEDURE — 99213 OFFICE O/P EST LOW 20 MIN: CPT

## 2025-04-16 PROCEDURE — 3074F SYST BP LT 130 MM HG: CPT

## 2025-04-16 RX ORDER — ALPRAZOLAM 1 MG/1
1 TABLET ORAL NIGHTLY PRN
Qty: 30 TABLET | Refills: 0 | Status: SHIPPED | OUTPATIENT
Start: 2025-04-16 | End: 2025-05-16

## 2025-04-16 RX ORDER — FLUCONAZOLE 150 MG/1
150 TABLET ORAL
Qty: 2 TABLET | Refills: 0 | Status: SHIPPED | OUTPATIENT
Start: 2025-04-16

## 2025-04-16 RX ORDER — ALPRAZOLAM 1 MG/1
1 TABLET ORAL NIGHTLY PRN
Qty: 30 TABLET | Refills: 0 | Status: SHIPPED | OUTPATIENT
Start: 2025-05-16 | End: 2025-06-15

## 2025-04-16 RX ORDER — ALPRAZOLAM 1 MG/1
1 TABLET ORAL NIGHTLY PRN
Qty: 30 TABLET | Refills: 0 | Status: SHIPPED | OUTPATIENT
Start: 2025-06-15 | End: 2025-07-15

## 2025-04-16 ASSESSMENT — ENCOUNTER SYMPTOMS
DIZZINESS: 0
COUGH: 0
VOMITING: 0
NAUSEA: 0
DIARRHEA: 0
MYALGIAS: 0
HEADACHES: 0
CHILLS: 0
WEAKNESS: 0
FEVER: 0
CONSTIPATION: 0
ABDOMINAL PAIN: 0
WEIGHT LOSS: 0
BLURRED VISION: 0
SHORTNESS OF BREATH: 0

## 2025-04-16 ASSESSMENT — FIBROSIS 4 INDEX: FIB4 SCORE: 1.09

## 2025-04-16 ASSESSMENT — PATIENT HEALTH QUESTIONNAIRE - PHQ9: CLINICAL INTERPRETATION OF PHQ2 SCORE: 0

## 2025-04-17 NOTE — PROGRESS NOTES
"Verbal consent was acquired by the patient to use [employer] ambient listening note generation during this visit  Subjective:     CC:  Diagnoses of Primary insomnia, Situational anxiety, and Yeast infection were pertinent to this visit.    HISTORY OF THE PRESENT ILLNESS: Patient is a 39 y.o. female.   Problem   Yeast Infection       History of Present Illness  The patient presents for a medication refill of alprazolam and treatment for candidiasis.    Medication Refill  - The patient is currently on a regimen of alprazolam, with a dosage of 1 mg nightly, occasionally increased to 2 mg to aid in sleep.  - She typically awakens around 1 or 2 AM and administers a quarter dose of the medication.  - She expresses a desire to revert to a consistent 1 mg dosage.  - Additionally, she maintains a reserve supply of the medication for instances where an increased dosage may be necessary.    Treatment for Candidiasis  - A prescription for fluconazole is requested to manage a candidiasis infection.    ROS:   Review of Systems   Constitutional:  Negative for chills, fever, malaise/fatigue and weight loss.   Eyes:  Negative for blurred vision.   Respiratory:  Negative for cough and shortness of breath.    Cardiovascular:  Negative for chest pain.   Gastrointestinal:  Negative for abdominal pain, constipation, diarrhea, nausea and vomiting.   Musculoskeletal:  Negative for myalgias.   Neurological:  Negative for dizziness, weakness and headaches.         Objective:     Exam: /60 (BP Location: Left arm, Patient Position: Sitting, BP Cuff Size: Adult)   Pulse 69   Temp 36.8 °C (98.3 °F) (Temporal)   Ht 1.676 m (5' 6\")   Wt 67 kg (147 lb 9.6 oz)   SpO2 99%  Body mass index is 23.82 kg/m².    Physical Exam  Constitutional:       General: She is not in acute distress.     Appearance: Normal appearance. She is not ill-appearing or toxic-appearing.   HENT:      Head: Normocephalic.   Eyes:      Conjunctiva/sclera: Conjunctivae " normal.   Pulmonary:      Effort: Pulmonary effort is normal.   Skin:     General: Skin is warm and dry.   Neurological:      General: No focal deficit present.      Mental Status: She is alert and oriented to person, place, and time.   Psychiatric:         Mood and Affect: Mood normal.         Behavior: Behavior normal.           Labs:   No visits with results within 1 Month(s) from this visit.   Latest known visit with results is:   Hospital Outpatient Visit on 01/08/2025   Component Date Value    Hsv I-Ii Igg Antibodies 01/08/2025 0.30     Cholesterol,Tot 01/08/2025 156     Triglycerides 01/08/2025 109     HDL 01/08/2025 81     LDL 01/08/2025 53     Sodium 01/08/2025 137     Potassium 01/08/2025 4.4     Chloride 01/08/2025 102     Co2 01/08/2025 25     Anion Gap 01/08/2025 10.0     Glucose 01/08/2025 127 (H)     Bun 01/08/2025 14     Creatinine 01/08/2025 0.94     Calcium 01/08/2025 9.4     Correct Calcium 01/08/2025 9.2     AST(SGOT) 01/08/2025 24     ALT(SGPT) 01/08/2025 15     Alkaline Phosphatase 01/08/2025 52     Total Bilirubin 01/08/2025 0.3     Albumin 01/08/2025 4.2     Total Protein 01/08/2025 7.2     Globulin 01/08/2025 3.0     A-G Ratio 01/08/2025 1.4     WBC 01/08/2025 6.0     RBC 01/08/2025 4.12 (L)     Hemoglobin 01/08/2025 13.4     Hematocrit 01/08/2025 39.8     MCV 01/08/2025 96.6     MCH 01/08/2025 32.5     MCHC 01/08/2025 33.7     RDW 01/08/2025 42.9     Platelet Count 01/08/2025 227     MPV 01/08/2025 10.0     Neutrophils-Polys 01/08/2025 54.60     Lymphocytes 01/08/2025 31.20     Monocytes 01/08/2025 10.20     Eosinophils 01/08/2025 2.70     Basophils 01/08/2025 1.00     Immature Granulocytes 01/08/2025 0.30     Nucleated RBC 01/08/2025 0.00     Neutrophils (Absolute) 01/08/2025 3.27     Lymphs (Absolute) 01/08/2025 1.87     Monos (Absolute) 01/08/2025 0.61     Eos (Absolute) 01/08/2025 0.16     Baso (Absolute) 01/08/2025 0.06     Immature Granulocytes (a* 01/08/2025 0.02     NRBC (Absolute)  01/08/2025 0.00     C. trachomatis by PCR 01/08/2025 Negative     Gc By Dna Probe 01/08/2025 Negative     Source 01/08/2025 Urine     Hepatitis B Surface Anti* 01/08/2025 Non-Reactive     Hepatitis B Core Ab, Tot* 01/08/2025 NonReactive     Hep B Surface Antibody Q* 01/08/2025 331.00 (H)     Hepatitis C Antibody 01/08/2025 Non-Reactive     Syphilis, Treponemal Qual 01/08/2025 Non-Reactive     HIV Ag/Ab Combo Assay 01/08/2025 Non-Reactive     GFR (CKD-EPI) 01/08/2025 79          Assessment & Plan: Medical Decision Making   39 y.o. female with the following -    Assessment & Plan  1. Anxiety.  - Currently taking Xanax 1 mg nightly as needed for sleep and anxiety.  - Labs were updated in 12/2024, confirming no recent issues.  - Discussed the need for a buffer supply in case of increased anxiety or sleep disturbances.  - Prescription for Xanax 1 mg issued, to be taken nightly if required. Prescription Drug Monitoring Program checked and refill appropriate. UDS/CSA UTD.     2. Yeast infection.  - Reported symptoms consistent with a yeast infection.  - No recent physical exam findings discussed.  - Discussed the management of symptoms and the use of Diflucan.  - Prescription for Diflucan  150 mg daily provided. If symptoms persist, follow-up for further evaluation and treatment is advised.      Problem List Items Addressed This Visit       Situational anxiety    Relevant Medications    ALPRAZolam (XANAX) 1 MG Tab    ALPRAZolam (XANAX) 1 MG Tab (Start on 5/16/2025)    ALPRAZolam (XANAX) 1 MG Tab (Start on 6/15/2025)    Primary insomnia    Relevant Medications    ALPRAZolam (XANAX) 1 MG Tab    ALPRAZolam (XANAX) 1 MG Tab (Start on 5/16/2025)    ALPRAZolam (XANAX) 1 MG Tab (Start on 6/15/2025)    Yeast infection    Relevant Medications    fluconazole (DIFLUCAN) 150 MG tablet       Differential diagnosis, natural history, supportive care, and indications for immediate follow-up discussed.  Shared decision making approach was  utilized, and patient is amendable with plan of care.  Patient understands to return to clinic or go to the emergency department if symptoms worsen. All questions and concerns addressed to the best of my knowledge.      Return in about 3 months (around 7/16/2025), or if symptoms worsen or fail to improve.    Please note that this dictation was created using voice recognition software. I have made every reasonable attempt to correct obvious errors, but I expect that there are errors of grammar and possibly content that I did not discover before finalizing the note.

## 2025-06-01 DIAGNOSIS — F41.8 SITUATIONAL ANXIETY: ICD-10-CM

## 2025-06-01 DIAGNOSIS — F51.01 PRIMARY INSOMNIA: ICD-10-CM

## 2025-06-02 RX ORDER — ALPRAZOLAM 1 MG/1
1 TABLET ORAL NIGHTLY PRN
Qty: 30 TABLET | Refills: 0 | Status: SHIPPED | OUTPATIENT
Start: 2025-07-02 | End: 2025-08-01

## 2025-06-02 RX ORDER — ALPRAZOLAM 1 MG/1
1 TABLET ORAL NIGHTLY PRN
Qty: 30 TABLET | Refills: 0 | Status: SHIPPED | OUTPATIENT
Start: 2025-06-02 | End: 2025-07-02

## 2025-06-02 NOTE — TELEPHONE ENCOUNTER
Received request via: Pharmacy    Was the patient seen in the last year in this department? Yes    Does the patient have an active prescription (recently filled or refills available) for medication(s) requested? No    [facility]    Does the patient have [facility] Plus and need 100-day supply? (This applies to ALL medications) Patient does not have SCP

## 2025-06-02 NOTE — TELEPHONE ENCOUNTER
Received request via: Pharmacy    Was the patient seen in the last year in this department? Yes    Does the patient have an active prescription (recently filled or refills available) for medication(s) requested? No    [facility] Name: [facility]    Does the patient have group home Plus and need 100-day supply? (This applies to ALL medications) Patient does not have SCP

## 2025-07-01 ENCOUNTER — TELEPHONE (OUTPATIENT)
Dept: MEDICAL GROUP | Facility: PHYSICIAN GROUP | Age: 40
End: 2025-07-01
Payer: COMMERCIAL

## 2025-07-01 DIAGNOSIS — Z11.3 SCREENING EXAMINATION FOR SEXUALLY TRANSMITTED DISEASE: Primary | ICD-10-CM

## 2025-07-01 DIAGNOSIS — F41.8 SITUATIONAL ANXIETY: ICD-10-CM

## 2025-07-01 DIAGNOSIS — F51.01 PRIMARY INSOMNIA: ICD-10-CM

## 2025-07-01 RX ORDER — ALPRAZOLAM 1 MG/1
1 TABLET ORAL NIGHTLY PRN
Qty: 30 TABLET | Refills: 0 | Status: SHIPPED
Start: 2025-07-05 | End: 2025-07-31

## 2025-07-31 ENCOUNTER — OFFICE VISIT (OUTPATIENT)
Dept: MEDICAL GROUP | Facility: PHYSICIAN GROUP | Age: 40
End: 2025-07-31
Payer: COMMERCIAL

## 2025-07-31 ENCOUNTER — HOSPITAL ENCOUNTER (OUTPATIENT)
Dept: LAB | Facility: MEDICAL CENTER | Age: 40
End: 2025-07-31
Payer: COMMERCIAL

## 2025-07-31 VITALS
RESPIRATION RATE: 16 BRPM | DIASTOLIC BLOOD PRESSURE: 60 MMHG | BODY MASS INDEX: 24.17 KG/M2 | HEIGHT: 66 IN | OXYGEN SATURATION: 100 % | HEART RATE: 59 BPM | SYSTOLIC BLOOD PRESSURE: 90 MMHG | TEMPERATURE: 99.5 F | WEIGHT: 150.4 LBS

## 2025-07-31 DIAGNOSIS — F41.9 ANXIETY: Primary | ICD-10-CM

## 2025-07-31 DIAGNOSIS — Z11.3 SCREENING EXAMINATION FOR SEXUALLY TRANSMITTED DISEASE: ICD-10-CM

## 2025-07-31 DIAGNOSIS — F43.9 STRESS: ICD-10-CM

## 2025-07-31 LAB
HBV CORE AB SERPL QL IA: NONREACTIVE
HBV SURFACE AB SERPL IA-ACNC: 316 MIU/ML (ref 0–10)
HBV SURFACE AG SER QL: NORMAL
HCV AB SER QL: NORMAL
HIV 1+2 AB+HIV1 P24 AG SERPL QL IA: NORMAL
T PALLIDUM AB SER QL IA: NORMAL

## 2025-07-31 PROCEDURE — 86706 HEP B SURFACE ANTIBODY: CPT

## 2025-07-31 PROCEDURE — 87340 HEPATITIS B SURFACE AG IA: CPT

## 2025-07-31 PROCEDURE — 86704 HEP B CORE ANTIBODY TOTAL: CPT

## 2025-07-31 PROCEDURE — 86803 HEPATITIS C AB TEST: CPT

## 2025-07-31 PROCEDURE — 36415 COLL VENOUS BLD VENIPUNCTURE: CPT

## 2025-07-31 PROCEDURE — 86780 TREPONEMA PALLIDUM: CPT

## 2025-07-31 PROCEDURE — 87389 HIV-1 AG W/HIV-1&-2 AB AG IA: CPT

## 2025-07-31 PROCEDURE — 87591 N.GONORRHOEAE DNA AMP PROB: CPT

## 2025-07-31 PROCEDURE — 87491 CHLMYD TRACH DNA AMP PROBE: CPT

## 2025-07-31 RX ORDER — ALPRAZOLAM 2 MG/1
2 TABLET ORAL NIGHTLY PRN
Qty: 30 TABLET | Refills: 0 | Status: SHIPPED | OUTPATIENT
Start: 2025-08-05 | End: 2025-09-04

## 2025-07-31 RX ORDER — ALPRAZOLAM 1 MG/1
1 TABLET ORAL NIGHTLY PRN
Qty: 30 TABLET | Refills: 0 | Status: SHIPPED | OUTPATIENT
Start: 2025-09-04 | End: 2025-10-04

## 2025-07-31 RX ORDER — ALPRAZOLAM 1 MG/1
1 TABLET ORAL NIGHTLY PRN
Qty: 30 TABLET | Refills: 0 | Status: SHIPPED | OUTPATIENT
Start: 2025-10-04 | End: 2025-11-03

## 2025-07-31 RX ORDER — NORETHINDRONE ACETATE AND ETHINYL ESTRADIOL .02; 1 MG/1; MG/1
1 TABLET ORAL DAILY
COMMUNITY

## 2025-07-31 ASSESSMENT — ENCOUNTER SYMPTOMS
WEIGHT LOSS: 0
DIZZINESS: 0
FEVER: 0
INSOMNIA: 1
DIARRHEA: 0
COUGH: 0
ABDOMINAL PAIN: 0
VOMITING: 0
SHORTNESS OF BREATH: 0
NERVOUS/ANXIOUS: 1
NAUSEA: 0
HEADACHES: 0
MYALGIAS: 0
WEAKNESS: 0
BLURRED VISION: 0
CONSTIPATION: 0
CHILLS: 0

## 2025-07-31 ASSESSMENT — FIBROSIS 4 INDEX: FIB4 SCORE: 1.09

## 2025-07-31 NOTE — PROGRESS NOTES
Verbal consent was acquired by the patient to use [employer] ambient listening note generation during this visit  Subjective:     CC:  The primary encounter diagnosis was Anxiety. A diagnosis of Stress was also pertinent to this visit.    HISTORY OF THE PRESENT ILLNESS: Patient is a 39 y.o. female.   No problems updated.    History of Present Illness  The patient presents for a medication refill.    Medication Refill Request  - She requests a refill of her Alprazolam (Xanax) prescription, specifically the 2 mg dosage.  - She has been utilizing the medication more frequently due to heightened stress related to occupational and personal circumstances.  - She seeks a one-month supply of the 2 mg dosage, with the intention to subsequently revert to the 1 mg dosage.  - Her most recent urine drug screen was conducted in either November 2024 or December 2024.    Contraceptive Inquiry  - The patient inquires about the appropriateness of her current contraceptive method.    Cognitive Impairment  - She reports experiencing cognitive impairment described as 'brain fog' today.    Vasomotor Symptoms  - She has been managing vasomotor symptoms, specifically hot flashes, for a prolonged duration.  - She finds symptomatic relief by administering magnesium supplementation at night.    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    ROS:   Review of Systems   Constitutional:  Negative for chills, fever, malaise/fatigue and weight loss.   Eyes:  Negative for blurred vision.   Respiratory:  Negative for cough and shortness of breath.    Cardiovascular:  Negative for chest pain.   Gastrointestinal:  Negative for abdominal pain, constipation, diarrhea, nausea and vomiting.   Musculoskeletal:  Negative for myalgias.   Neurological:  Negative for dizziness, weakness and headaches.   Psychiatric/Behavioral:  The patient is nervous/anxious and has insomnia.          Objective:     Exam: BP 90/60   Pulse (!) 59   Temp 37.5 °C (99.5  "°F) (Temporal)   Resp 16   Ht 1.676 m (5' 6\")   Wt 68.2 kg (150 lb 6.4 oz)   SpO2 100%  Body mass index is 24.28 kg/m².    Physical Exam  Constitutional:       General: She is not in acute distress.     Appearance: Normal appearance. She is not ill-appearing or toxic-appearing.   HENT:      Head: Normocephalic.   Eyes:      Conjunctiva/sclera: Conjunctivae normal.   Pulmonary:      Effort: Pulmonary effort is normal.   Skin:     General: Skin is warm and dry.   Neurological:      General: No focal deficit present.      Mental Status: She is alert and oriented to person, place, and time.   Psychiatric:         Mood and Affect: Mood normal.         Behavior: Behavior normal.           Labs:   No visits with results within 1 Month(s) from this visit.   Latest known visit with results is:   Hospital Outpatient Visit on 01/08/2025   Component Date Value    Hsv I-Ii Igg Antibodies 01/08/2025 0.30     Cholesterol,Tot 01/08/2025 156     Triglycerides 01/08/2025 109     HDL 01/08/2025 81     LDL 01/08/2025 53     Sodium 01/08/2025 137     Potassium 01/08/2025 4.4     Chloride 01/08/2025 102     Co2 01/08/2025 25     Anion Gap 01/08/2025 10.0     Glucose 01/08/2025 127 (H)     Bun 01/08/2025 14     Creatinine 01/08/2025 0.94     Calcium 01/08/2025 9.4     Correct Calcium 01/08/2025 9.2     AST(SGOT) 01/08/2025 24     ALT(SGPT) 01/08/2025 15     Alkaline Phosphatase 01/08/2025 52     Total Bilirubin 01/08/2025 0.3     Albumin 01/08/2025 4.2     Total Protein 01/08/2025 7.2     Globulin 01/08/2025 3.0     A-G Ratio 01/08/2025 1.4     WBC 01/08/2025 6.0     RBC 01/08/2025 4.12 (L)     Hemoglobin 01/08/2025 13.4     Hematocrit 01/08/2025 39.8     MCV 01/08/2025 96.6     MCH 01/08/2025 32.5     MCHC 01/08/2025 33.7     RDW 01/08/2025 42.9     Platelet Count 01/08/2025 227     MPV 01/08/2025 10.0     Neutrophils-Polys 01/08/2025 54.60     Lymphocytes 01/08/2025 31.20     Monocytes 01/08/2025 10.20     Eosinophils 01/08/2025 " "2.70     Basophils 01/08/2025 1.00     Immature Granulocytes 01/08/2025 0.30     Nucleated RBC 01/08/2025 0.00     Neutrophils (Absolute) 01/08/2025 3.27     Lymphs (Absolute) 01/08/2025 1.87     Monos (Absolute) 01/08/2025 0.61     Eos (Absolute) 01/08/2025 0.16     Baso (Absolute) 01/08/2025 0.06     Immature Granulocytes (a* 01/08/2025 0.02     NRBC (Absolute) 01/08/2025 0.00     C. trachomatis by PCR 01/08/2025 Negative     Gc By Dna Probe 01/08/2025 Negative     Source 01/08/2025 Urine     Hepatitis B Surface Anti* 01/08/2025 Non-Reactive     Hepatitis B Core Ab, Tot* 01/08/2025 NonReactive     Hep B Surface Antibody Q* 01/08/2025 331.00 (H)     Hepatitis C Antibody 01/08/2025 Non-Reactive     Syphilis, Treponemal Qual 01/08/2025 Non-Reactive     HIV Ag/Ab Combo Assay 01/08/2025 Non-Reactive     GFR (CKD-EPI) 01/08/2025 79          Assessment & Plan: Medical Decision Making   39 y.o. female with the following -    Assessment & Plan  1. Anxiety.  Chronic uncontrolled   - Anxiety levels have escalated due to work-related stress.  - Prescription for Xanax 2 mg provided, with instructions to take as needed for anxiety. The next two refills will be for 1 mg.  - Advised to monitor symptoms and report any significant changes.  - Urine drug screen discussed, last performed in 11/2024.    2. Perimenopause.  - Currently on a low-dose estrogen and progesterone regimen, deemed appropriate for perimenopausal symptoms.  - Advised to continue current birth control regimen.  - Potential side effects of perimenopause, including brain fog, itching, palpitations, skin changes, and mood fluctuations, discussed.  - Encouraged to discuss mother's menopausal history to anticipate own symptoms better. Recommended reading \"Perimenopausal Survival Guide\" by Linsey Mejia.    Follow-up: A follow-up appointment is scheduled for 11/05/2025 at 3:20 PM.      Problem List Items Addressed This Visit    None  Visit Diagnoses         Anxiety    " -  Primary    Relevant Medications    alprazolam (XANAX) 2 MG tablet (Start on 8/5/2025)    ALPRAZolam (XANAX) 1 MG Tab (Start on 9/4/2025)    ALPRAZolam (XANAX) 1 MG Tab (Start on 10/4/2025)      Stress        Relevant Medications    alprazolam (XANAX) 2 MG tablet (Start on 8/5/2025)    ALPRAZolam (XANAX) 1 MG Tab (Start on 9/4/2025)    ALPRAZolam (XANAX) 1 MG Tab (Start on 10/4/2025)            Differential diagnosis, natural history, supportive care, and indications for immediate follow-up discussed.  Shared decision making approach was utilized, and patient is amendable with plan of care.  Patient understands to return to clinic or go to the emergency department if symptoms worsen. All questions and concerns addressed to the best of my knowledge.      Return in about 3 months (around 10/31/2025), or if symptoms worsen or fail to improve.    Please note that this dictation was created using voice recognition software. I have made every reasonable attempt to correct obvious errors, but I expect that there are errors of grammar and possibly content that I did not discover before finalizing the note.

## 2025-08-20 DIAGNOSIS — E66.3 OVERWEIGHT (BMI 25.0-29.9): Primary | ICD-10-CM

## 2025-08-20 RX ORDER — PHENTERMINE HYDROCHLORIDE 37.5 MG/1
37.5 TABLET ORAL
Qty: 90 TABLET | Refills: 0 | Status: SHIPPED | OUTPATIENT
Start: 2025-08-20 | End: 2025-11-18

## 2025-08-24 ENCOUNTER — HOSPITAL ENCOUNTER (EMERGENCY)
Facility: MEDICAL CENTER | Age: 40
End: 2025-08-24
Attending: EMERGENCY MEDICINE
Payer: COMMERCIAL

## 2025-08-24 VITALS
SYSTOLIC BLOOD PRESSURE: 128 MMHG | RESPIRATION RATE: 16 BRPM | HEIGHT: 66 IN | OXYGEN SATURATION: 95 % | TEMPERATURE: 97.4 F | HEART RATE: 68 BPM | DIASTOLIC BLOOD PRESSURE: 64 MMHG | WEIGHT: 150 LBS | BODY MASS INDEX: 24.11 KG/M2

## 2025-08-24 DIAGNOSIS — S01.81XA FACIAL LACERATION, INITIAL ENCOUNTER: Primary | ICD-10-CM

## 2025-08-24 PROCEDURE — 303747 HCHG EXTRA SUTURE

## 2025-08-24 PROCEDURE — 700102 HCHG RX REV CODE 250 W/ 637 OVERRIDE(OP): Performed by: EMERGENCY MEDICINE

## 2025-08-24 PROCEDURE — A9270 NON-COVERED ITEM OR SERVICE: HCPCS | Performed by: EMERGENCY MEDICINE

## 2025-08-24 PROCEDURE — 304999 HCHG REPAIR-SIMPLE/INTERMED LEVEL 1

## 2025-08-24 PROCEDURE — 700101 HCHG RX REV CODE 250: Performed by: EMERGENCY MEDICINE

## 2025-08-24 PROCEDURE — 99284 EMERGENCY DEPT VISIT MOD MDM: CPT

## 2025-08-24 RX ORDER — ACETAMINOPHEN 325 MG/1
975 TABLET ORAL ONCE
Status: COMPLETED | OUTPATIENT
Start: 2025-08-24 | End: 2025-08-24

## 2025-08-24 RX ORDER — LIDOCAINE HYDROCHLORIDE AND EPINEPHRINE BITARTRATE 20; .01 MG/ML; MG/ML
10 INJECTION, SOLUTION SUBCUTANEOUS ONCE
Status: COMPLETED | OUTPATIENT
Start: 2025-08-24 | End: 2025-08-24

## 2025-08-24 RX ADMIN — LIDOCAINE HYDROCHLORIDE AND EPINEPHRINE 10 ML: 10; 20 INJECTION, SOLUTION INFILTRATION; PERINEURAL at 10:15

## 2025-08-24 RX ADMIN — ACETAMINOPHEN 975 MG: 325 TABLET ORAL at 10:13

## 2025-08-24 ASSESSMENT — FIBROSIS 4 INDEX: FIB4 SCORE: 1.09

## (undated) DEVICE — TUBE CONNECTING SUCTION - CLEAR PLASTIC STERILE 72 IN (50EA/CA)

## (undated) DEVICE — SUTURE 5-0 PLAIN GUT PC-1 - (12/BX)

## (undated) DEVICE — HEAD HOLDER JUNIOR/ADULT

## (undated) DEVICE — KIT ANESTHESIA W/CIRCUIT & 3/LT BAG W/FILTER (20EA/CA)

## (undated) DEVICE — SENSOR SPO2 NEO LNCS ADHESIVE (20/BX) SEE USER NOTES

## (undated) DEVICE — HUMID-VENT HEAT AND MOISTURE EXCHANGE- (50/BX)

## (undated) DEVICE — SUTURE 3-0 MONOCRYL PLUS PS-2 - (12/BX)

## (undated) DEVICE — SET EXTENSION WITH 2 PORTS (48EA/CA) ***PART #2C8610 IS A SUBSTITUTE*****

## (undated) DEVICE — CHLORAPREP 26 ML APPLICATOR - ORANGE TINT(25/CA)

## (undated) DEVICE — LACTATED RINGERS INJ 1000 ML - (14EA/CA 60CA/PF)

## (undated) DEVICE — SET LEADWIRE 5 LEAD BEDSIDE DISPOSABLE ECG (1SET OF 5/EA)

## (undated) DEVICE — PAD LAP STERILE 18 X 18 - (5/PK 40PK/CA)

## (undated) DEVICE — NEEDLE NON SAFETY 25 GA X 1 1/2 IN HYPO (100EA/BX)

## (undated) DEVICE — PROTECTOR ULNA NERVE - (36PR/CA)

## (undated) DEVICE — SUCTION INSTRUMENT YANKAUER BULBOUS TIP W/O VENT (50EA/CA)

## (undated) DEVICE — GLOVE BIOGEL PI INDICATOR SZ 7.0 SURGICAL PF LF - (50/BX 4BX/CA)

## (undated) DEVICE — SUTURE 3-0 VICRYL PLUS SH - 8X 18 INCH (12/BX)

## (undated) DEVICE — CANISTER SUCTION 3000ML MECHANICAL FILTER AUTO SHUTOFF MEDI-VAC NONSTERILE LF DISP  (40EA/CA)

## (undated) DEVICE — KIT ROOM DECONTAMINATION

## (undated) DEVICE — MASK ANESTHESIA ADULT  - (100/CA)

## (undated) DEVICE — DRESSING TRANSPARENT FILM TEGADERM 2.375 X 2.75"  (100EA/BX)"

## (undated) DEVICE — ELECTRODE DUAL RETURN W/ CORD - (50/PK)

## (undated) DEVICE — GLOVE BIOGEL SZ 6.5 SURGICAL PF LTX (50PR/BX 4BX/CA)

## (undated) DEVICE — TUBING TUMESENCE - 10/BX

## (undated) DEVICE — KIT  I.V. START (100EA/CA)

## (undated) DEVICE — GOWN SURGEONS LARGE - (32/CA)

## (undated) DEVICE — ELECTRODE 850 FOAM ADHESIVE - HYDROGEL RADIOTRNSPRNT (50/PK)

## (undated) DEVICE — SUTURE 3-0 27IN PDS PLUS CLR - SH (36/BX)

## (undated) DEVICE — SUTURE 4-0 MONOCRYL PLUS PS-2 - 27 INCH (36/BX)

## (undated) DEVICE — SLEEVE VASO CALF MED - (10PR/CA)

## (undated) DEVICE — NEPTUNE 4 PORT MANIFOLD - (20/PK)

## (undated) DEVICE — TUBING PAL ASPIRATION LIPOSUC 12FT (5EA/PK)

## (undated) DEVICE — TUBING CLEARLINK DUO-VENT - C-FLO (48EA/CA)

## (undated) DEVICE — SHEET TRANSVERSE LAP - (12EA/CA)

## (undated) DEVICE — GOWN SURGEONS X-LARGE - DISP. (30/CA)

## (undated) DEVICE — ADHESIVE MASTISOL - (48/BX)

## (undated) DEVICE — TRAY SKIN SCRUB PVP WET (20EA/CA) PART #DYND70356 DISCONTINUED

## (undated) DEVICE — SODIUM CHL IRRIGATION 0.9% 1000ML (12EA/CA)

## (undated) DEVICE — BLADE SURGICAL #15 - (50/BX 3BX/CA)

## (undated) DEVICE — CANISTER SUCTION RIGID RED 1500CC (40EA/CA)

## (undated) DEVICE — CLIP MED INTNL HRZN TI ESCP - (25/BX)

## (undated) DEVICE — GUIDE PHOTON WIDE FLAT

## (undated) DEVICE — GLOVE BIOGEL INDICATOR SZ 6.5 SURGICAL PF LTX - (50PR/BX 4BX/CA)

## (undated) DEVICE — GLOVE BIOGEL PI INDICATOR SZ 6.5 SURGICAL PF LF - (50/BX 4BX/CA)

## (undated) DEVICE — SUTURE GENERAL

## (undated) DEVICE — SUTURE 4-0 VICRYL PLUS FS-2 - 27 INCH (36/BX)

## (undated) DEVICE — GLOVE BIOGEL INDICATOR SZ 7.5 SURGICAL PF LTX - (50PR/BX 4BX/CA)

## (undated) DEVICE — GOWN WARMING STANDARD FLEX - (30/CA)

## (undated) DEVICE — BOVIE BLADE COATED - (50/PK)

## (undated) DEVICE — SUTURE 4-0 PDS II RB-1 (36EA/BX)

## (undated) DEVICE — PACK MINOR BASIN - (2EA/CA)

## (undated) DEVICE — SLEEVE, VASO, THIGH, MED

## (undated) DEVICE — WATER IRRIGATION STERILE 1000ML (12EA/CA)

## (undated) DEVICE — BOVIE  BLADE 6 EXTENDED - (50/PK)

## (undated) DEVICE — TOWEL STOP TIMEOUT SAFETY FLAG (40EA/CA)

## (undated) DEVICE — BINDER ABDOMINAL 30X45X12IN - FITS 30-45IN WAIST 12IN HIGH

## (undated) DEVICE — BAG SPONGE COUNT 10.25 X 32 - BLUE (250/CA)

## (undated) DEVICE — BANDAGE ELASTIC STERILE MATRIX 6 X 10 (20EA/CA)

## (undated) DEVICE — CLOSURE SKIN STRIP 1/2 X 4 IN - (STERI STRIP) (50/BX 4BX/CA)

## (undated) DEVICE — CLOSURE SKIN STRIP 1 X 5 INCH (25EA/BX 4BX/CA)